# Patient Record
Sex: MALE | Race: WHITE | NOT HISPANIC OR LATINO | URBAN - METROPOLITAN AREA
[De-identification: names, ages, dates, MRNs, and addresses within clinical notes are randomized per-mention and may not be internally consistent; named-entity substitution may affect disease eponyms.]

---

## 2018-10-10 ENCOUNTER — EMERGENCY (EMERGENCY)
Facility: HOSPITAL | Age: 78
LOS: 1 days | Discharge: HOME | End: 2018-10-10
Attending: EMERGENCY MEDICINE | Admitting: EMERGENCY MEDICINE
Payer: MEDICARE

## 2018-10-10 VITALS
SYSTOLIC BLOOD PRESSURE: 151 MMHG | OXYGEN SATURATION: 97 % | RESPIRATION RATE: 20 BRPM | DIASTOLIC BLOOD PRESSURE: 94 MMHG | HEART RATE: 69 BPM | TEMPERATURE: 98 F

## 2018-10-10 DIAGNOSIS — Z79.82 LONG TERM (CURRENT) USE OF ASPIRIN: ICD-10-CM
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
## 2018-10-10 DIAGNOSIS — S01.81XA LACERATION WITHOUT FOREIGN BODY OF OTHER PART OF HEAD, INITIAL ENCOUNTER: ICD-10-CM

## 2018-10-10 DIAGNOSIS — S09.90XA UNSPECIFIED INJURY OF HEAD, INITIAL ENCOUNTER: ICD-10-CM
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
## 2018-10-10 DIAGNOSIS — S51.012A LACERATION WITHOUT FOREIGN BODY OF LEFT ELBOW, INITIAL ENCOUNTER: ICD-10-CM

## 2018-10-10 LAB
ANION GAP SERPL CALC-SCNC: 18 MMOL/L — HIGH (ref 5–17)
APTT BLD: 27 SEC — LOW (ref 27.5–37.4)
BASOPHILS NFR BLD AUTO: 1.4 % — SIGNIFICANT CHANGE UP (ref 0–2)
BLD GP AB SCN SERPL QL: NEGATIVE — SIGNIFICANT CHANGE UP
BUN SERPL-MCNC: 13 MG/DL — SIGNIFICANT CHANGE UP (ref 7–23)
CALCIUM SERPL-MCNC: 9.5 MG/DL — SIGNIFICANT CHANGE UP (ref 8.4–10.5)
CHLORIDE SERPL-SCNC: 97 MMOL/L — SIGNIFICANT CHANGE UP (ref 96–108)
CO2 SERPL-SCNC: 20 MMOL/L — LOW (ref 22–31)
CREAT SERPL-MCNC: 0.88 MG/DL — SIGNIFICANT CHANGE UP (ref 0.5–1.3)
EOSINOPHIL NFR BLD AUTO: 1.5 % — SIGNIFICANT CHANGE UP (ref 0–6)
GLUCOSE SERPL-MCNC: 87 MG/DL — SIGNIFICANT CHANGE UP (ref 70–99)
HCT VFR BLD CALC: 39.1 % — SIGNIFICANT CHANGE UP (ref 39–50)
HGB BLD-MCNC: 13.4 G/DL — SIGNIFICANT CHANGE UP (ref 13–17)
INR BLD: 1 — SIGNIFICANT CHANGE UP (ref 0.88–1.16)
LYMPHOCYTES # BLD AUTO: 36.9 % — SIGNIFICANT CHANGE UP (ref 13–44)
MCHC RBC-ENTMCNC: 34.3 G/DL — SIGNIFICANT CHANGE UP (ref 32–36)
MCHC RBC-ENTMCNC: 34.4 PG — HIGH (ref 27–34)
MCV RBC AUTO: 100.3 FL — HIGH (ref 80–100)
MONOCYTES NFR BLD AUTO: 13.2 % — SIGNIFICANT CHANGE UP (ref 2–14)
NEUTROPHILS NFR BLD AUTO: 47 % — SIGNIFICANT CHANGE UP (ref 43–77)
PLATELET # BLD AUTO: 224 K/UL — SIGNIFICANT CHANGE UP (ref 150–400)
POTASSIUM SERPL-MCNC: 4.4 MMOL/L — SIGNIFICANT CHANGE UP (ref 3.5–5.3)
POTASSIUM SERPL-SCNC: 4.4 MMOL/L — SIGNIFICANT CHANGE UP (ref 3.5–5.3)
PROTHROM AB SERPL-ACNC: 11.1 SEC — SIGNIFICANT CHANGE UP (ref 9.8–12.7)
RBC # BLD: 3.9 M/UL — LOW (ref 4.2–5.8)
RBC # FLD: 13 % — SIGNIFICANT CHANGE UP (ref 10.3–16.9)
RH IG SCN BLD-IMP: NEGATIVE — SIGNIFICANT CHANGE UP
SODIUM SERPL-SCNC: 135 MMOL/L — SIGNIFICANT CHANGE UP (ref 135–145)
WBC # BLD: 6.5 K/UL — SIGNIFICANT CHANGE UP (ref 3.8–10.5)
WBC # FLD AUTO: 6.5 K/UL — SIGNIFICANT CHANGE UP (ref 3.8–10.5)

## 2018-10-10 PROCEDURE — 70450 CT HEAD/BRAIN W/O DYE: CPT

## 2018-10-10 PROCEDURE — 99284 EMERGENCY DEPT VISIT MOD MDM: CPT | Mod: 25

## 2018-10-10 PROCEDURE — 36415 COLL VENOUS BLD VENIPUNCTURE: CPT

## 2018-10-10 PROCEDURE — 86901 BLOOD TYPING SEROLOGIC RH(D): CPT

## 2018-10-10 PROCEDURE — 70450 CT HEAD/BRAIN W/O DYE: CPT | Mod: 26

## 2018-10-10 PROCEDURE — 12053 INTMD RPR FACE/MM 5.1-7.5 CM: CPT

## 2018-10-10 PROCEDURE — 80048 BASIC METABOLIC PNL TOTAL CA: CPT

## 2018-10-10 PROCEDURE — 85027 COMPLETE CBC AUTOMATED: CPT

## 2018-10-10 PROCEDURE — 12004 RPR S/N/AX/GEN/TRK7.6-12.5CM: CPT

## 2018-10-10 PROCEDURE — 85025 COMPLETE CBC W/AUTO DIFF WBC: CPT

## 2018-10-10 PROCEDURE — 85730 THROMBOPLASTIN TIME PARTIAL: CPT

## 2018-10-10 PROCEDURE — 86850 RBC ANTIBODY SCREEN: CPT

## 2018-10-10 PROCEDURE — 86900 BLOOD TYPING SEROLOGIC ABO: CPT

## 2018-10-10 PROCEDURE — 85610 PROTHROMBIN TIME: CPT

## 2018-10-10 RX ORDER — SODIUM CHLORIDE 9 MG/ML
1000 INJECTION INTRAMUSCULAR; INTRAVENOUS; SUBCUTANEOUS
Qty: 0 | Refills: 0 | Status: DISCONTINUED | OUTPATIENT
Start: 2018-10-10 | End: 2018-10-14

## 2018-10-10 RX ADMIN — SODIUM CHLORIDE 125 MILLILITER(S): 9 INJECTION INTRAMUSCULAR; INTRAVENOUS; SUBCUTANEOUS at 20:00

## 2018-10-10 NOTE — ED ADULT NURSE NOTE - CHPI ED NUR SYMPTOMS NEG
no confusion/no numbness/no fever/no vomiting/no deformity/no loss of consciousness/no weakness/no tingling

## 2018-10-10 NOTE — ED ADULT NURSE NOTE - OBJECTIVE STATEMENT
Pt presents s/p mechanical fall approximately one hour ago sustaining left forehead head trauma and abrasion to left elbow. + Headstrike, no LOC, no other obvious signs of trauma or deformity. Pt states "I fell on uneven pavement. I did not have any symptoms prior to the fall". Pt denies taking any blood thinners. Per EMS report patient lost approximately 500cc blood. Pt denies any fevers, or recent illness, no lightheadedness, no dizziness, no headache, no change to vision, no cp, no sob, no n/v, no changes to bowels, no urinary complaints.

## 2018-10-10 NOTE — ED PROVIDER NOTE - NEUROLOGICAL, MLM
awake, alert, oriented x 3, CN II-XII grossly intact, motor 5/5, no gross sens deficits, gait steady, no ataxia, speech clear.

## 2018-10-10 NOTE — ED ADULT NURSE NOTE - NSIMPLEMENTINTERV_GEN_ALL_ED
Implemented All Fall Risk Interventions:  [place] to call system. Call bell, personal items and telephone within reach. Instruct patient to call for assistance. Room bathroom lighting operational. Non-slip footwear when patient is off stretcher. Physically safe environment: no spills, clutter or unnecessary equipment. Stretcher in lowest position, wheels locked, appropriate side rails in place. Provide visual cue, wrist band, yellow gown, etc. Monitor gait and stability. Monitor for mental status changes and reorient to person, place, and time. Review medications for side effects contributing to fall risk. Reinforce activity limits and safety measures with patient and family.

## 2018-10-10 NOTE — ED PROVIDER NOTE - OBJECTIVE STATEMENT
77 yo male no pmh biba after mechanical trip and fall w chi and forehead lac and L elbow skin tear.  No loc, ha, neck/back pain, ext pain/injury.  Last td 1-2 yr ago.  No n/v, change in vision/speech, numbness or weakness in ext, n/v.  Pt takes baby asa daily.  No other complaint.

## 2018-10-10 NOTE — ED PROVIDER NOTE - MEDICAL DECISION MAKING DETAILS
Pt c/o mechanical fall w chi, forehead lac, skin tear L arm w/o loc, ha, neuro deficits, n/v.  Td utd.  Plan labs w repeat hgb at 3 h 2/2 sig blood loss on scene, ct head, lac repair.  Pt declined pain meds.

## 2018-10-10 NOTE — ED PROVIDER NOTE - MUSCULOSKELETAL, MLM
Spine appears normal, range of motion is not limited, no muscle or joint tenderness, 2 4 cm skin tears L elbow o/w no injury or ttp bilat ext, radial and dp 1+

## 2018-10-10 NOTE — ED PROVIDER NOTE - CARE PLAN
Principal Discharge DX:	CHI (closed head injury), initial encounter  Secondary Diagnosis:	Scalp laceration, initial encounter Principal Discharge DX:	CHI (closed head injury), initial encounter  Secondary Diagnosis:	Scalp laceration, initial encounter  Secondary Diagnosis:	Skin tear of elbow without complication, left, initial encounter

## 2018-10-10 NOTE — ED PROVIDER NOTE - SECONDARY DIAGNOSIS.
Scalp laceration, initial encounter Skin tear of elbow without complication, left, initial encounter

## 2018-10-10 NOTE — ED PROVIDER NOTE - ENMT, MLM
Airway patent, Nasal mucosa clear. Mouth with normal mucosa. Throat has no vesicles, no oropharyngeal exudates and uvula is midline.  6 cm deep lac L forehead down to bone w galeal tear

## 2018-10-10 NOTE — ED ADULT TRIAGE NOTE - CHIEF COMPLAINT QUOTE
pt received to er with c/o trip and fall. As per EMS pt lost a significant amount of blood. NO LOC, NO dizziness, no chest pain.

## 2018-10-11 VITALS
RESPIRATION RATE: 18 BRPM | DIASTOLIC BLOOD PRESSURE: 89 MMHG | HEART RATE: 83 BPM | TEMPERATURE: 98 F | OXYGEN SATURATION: 94 % | SYSTOLIC BLOOD PRESSURE: 158 MMHG

## 2018-10-11 LAB
HCT VFR BLD CALC: 40.6 % — SIGNIFICANT CHANGE UP (ref 39–50)
HGB BLD-MCNC: 14.4 G/DL — SIGNIFICANT CHANGE UP (ref 13–17)
MCHC RBC-ENTMCNC: 35.2 PG — HIGH (ref 27–34)
MCHC RBC-ENTMCNC: 35.5 G/DL — SIGNIFICANT CHANGE UP (ref 32–36)
MCV RBC AUTO: 99.3 FL — SIGNIFICANT CHANGE UP (ref 80–100)
PLATELET # BLD AUTO: 218 K/UL — SIGNIFICANT CHANGE UP (ref 150–400)
RBC # BLD: 4.09 M/UL — LOW (ref 4.2–5.8)
RBC # FLD: 13 % — SIGNIFICANT CHANGE UP (ref 10.3–16.9)
WBC # BLD: 7.9 K/UL — SIGNIFICANT CHANGE UP (ref 3.8–10.5)
WBC # FLD AUTO: 7.9 K/UL — SIGNIFICANT CHANGE UP (ref 3.8–10.5)

## 2018-10-19 ENCOUNTER — EMERGENCY (EMERGENCY)
Facility: HOSPITAL | Age: 78
LOS: 1 days | Discharge: HOME | End: 2018-10-19
Admitting: EMERGENCY MEDICINE
Payer: MEDICARE

## 2018-10-19 VITALS
WEIGHT: 177.03 LBS | OXYGEN SATURATION: 98 % | TEMPERATURE: 98 F | SYSTOLIC BLOOD PRESSURE: 148 MMHG | RESPIRATION RATE: 16 BRPM | HEART RATE: 64 BPM | DIASTOLIC BLOOD PRESSURE: 83 MMHG
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
## 2018-10-19 DIAGNOSIS — S01.81XD LACERATION WITHOUT FOREIGN BODY OF OTHER PART OF HEAD, SUBSEQUENT ENCOUNTER: ICD-10-CM

## 2018-10-19 DIAGNOSIS — Z79.82 LONG TERM (CURRENT) USE OF ASPIRIN: ICD-10-CM

## 2018-10-19 PROCEDURE — 99282 EMERGENCY DEPT VISIT SF MDM: CPT

## 2018-10-19 NOTE — ED ADULT TRIAGE NOTE - CHIEF COMPLAINT QUOTE
Pt here to have sutures removed from L forehead - denies fevers, chills, bleeding, drainage or increased pain at site.

## 2018-10-19 NOTE — ED ADULT NURSE NOTE - CHPI ED NUR SYMPTOMS NEG
no bleeding at site/no chills/no rectal pain/no fever/no inflammation/no pain/no redness/no drainage/no bleeding/no purulent drainage

## 2018-10-19 NOTE — ED PROVIDER NOTE - MEDICAL DECISION MAKING DETAILS
pt returns for suture removal however wound not fully healed, no signs of inf, wound care discussed at length, to return in 4 d for removal, pt understands and agrees w/plan

## 2018-10-19 NOTE — ED ADULT NURSE NOTE - NSIMPLEMENTINTERV_GEN_ALL_ED
Implemented All Universal Safety Interventions:  [place] to call system. Call bell, personal items and telephone within reach. Instruct patient to call for assistance. Room bathroom lighting operational. Non-slip footwear when patient is off stretcher. Physically safe environment: no spills, clutter or unnecessary equipment. Stretcher in lowest position, wheels locked, appropriate side rails in place.

## 2018-10-19 NOTE — ED PROVIDER NOTE - SKIN, MLM
+ large dry scab over sutured site of L forehead, + wound dehiscence, wound not fully healed, no erythema, no pus, no swelling, no tend

## 2018-10-19 NOTE — ED ADULT NURSE NOTE - OBJECTIVE STATEMENT
Pt presents to ED today for suture removal.  Pt had stitches placed x8 days ago.  Pt skin intact w/o signs of infection presents.  Pt pending DC order.

## 2018-10-19 NOTE — ED PROVIDER NOTE - OBJECTIVE STATEMENT
The pt is a 79 y/o M, who returns to ED for suture removal - lac sutured a wk ago. Has not been washing/cleaning site. Denies pain, pus, bleeding.

## 2018-10-26 ENCOUNTER — EMERGENCY (EMERGENCY)
Facility: HOSPITAL | Age: 78
LOS: 1 days | Discharge: HOME | End: 2018-10-26
Admitting: EMERGENCY MEDICINE
Payer: MEDICARE

## 2018-10-26 VITALS
RESPIRATION RATE: 16 BRPM | OXYGEN SATURATION: 97 % | WEIGHT: 177.03 LBS | DIASTOLIC BLOOD PRESSURE: 91 MMHG | TEMPERATURE: 98 F | SYSTOLIC BLOOD PRESSURE: 164 MMHG | HEART RATE: 76 BPM | HEIGHT: 72 IN

## 2018-10-26 PROCEDURE — 99212 OFFICE O/P EST SF 10 MIN: CPT

## 2018-10-26 NOTE — ED PROVIDER NOTE - OBJECTIVE STATEMENT
77 yo M here for suture removal. Sutures placed to L forehead 14 days ago. Denies pain, fever, chills, swelling, discharge.

## 2018-10-26 NOTE — ED PROVIDER NOTE - MEDICAL DECISION MAKING DETAILS
77 yo M here for suture removal. Sutures placed to L forehead 14 days ago. Denies pain, fever, chills, swelling, discharge. Well healed laceration, no drainage, no erythema or discharge. 77 yo M here for suture removal. Sutures placed to L forehead 14 days ago. Denies pain, fever, chills, swelling, discharge. Well healed laceration, no drainage, no erythema or discharge. Sutures removed and steri strips placed

## 2018-10-26 NOTE — ED ADULT NURSE NOTE - OBJECTIVE STATEMENT
78y M, A&ox3, presents to ed for suture removal to left forehead, noted 12 stitches, no drainage, no redness, no pain. reports had sutures placed after sustaining previous trip and fall on 10/10. NAD.

## 2018-10-26 NOTE — ED ADULT NURSE NOTE - NSIMPLEMENTINTERV_GEN_ALL_ED
Implemented All Fall Risk Interventions:  [place] to call system. Call bell, personal items and telephone within reach. Instruct patient to call for assistance. Room bathroom lighting operational. Non-slip footwear when patient is off stretcher. Physically safe environment: no spills, clutter or unnecessary equipment. Stretcher in lowest position, wheels locked, appropriate side rails in place. Provide visual cue, wrist band, yellow gown, etc. Monitor gait and stability. Monitor for mental status changes and reorient to person, place, and time. Review medications for side effects contributing to fall risk. Reinforce activity limits and safety measures with patient and family.

## 2018-10-26 NOTE — ED PROVIDER NOTE - PHYSICAL EXAMINATION
CONSTITUTIONAL: Well-appearing; well-nourished; in no apparent distress.   HEAD: Normocephalic; well healed laceration, no drainage, no erythema or discharge.   EYES: PERRL; EOM intact; conjunctiva and sclera clear  ENT: normal nose; no rhinorrhea; normal pharynx with no erythema or lesions.   NECK: Supple; non-tender;   MSK: FROM at all extremities, normal tone   EXT: No cyanosis or edema; N/V intact
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
## 2018-10-30 DIAGNOSIS — S01.81XD LACERATION WITHOUT FOREIGN BODY OF OTHER PART OF HEAD, SUBSEQUENT ENCOUNTER: ICD-10-CM
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
## 2018-10-30 DIAGNOSIS — Z79.899 OTHER LONG TERM (CURRENT) DRUG THERAPY: ICD-10-CM

## 2018-12-19 ENCOUNTER — INPATIENT (INPATIENT)
Facility: HOSPITAL | Age: 78
LOS: 0 days | Discharge: HOME | DRG: 641 | End: 2018-12-19
Attending: INTERNAL MEDICINE | Admitting: INTERNAL MEDICINE
Payer: MEDICARE

## 2018-12-19 ENCOUNTER — TRANSCRIPTION ENCOUNTER (OUTPATIENT)
Age: 78
End: 2018-12-19

## 2018-12-19 VITALS
DIASTOLIC BLOOD PRESSURE: 91 MMHG | HEART RATE: 92 BPM | SYSTOLIC BLOOD PRESSURE: 146 MMHG | OXYGEN SATURATION: 94 % | RESPIRATION RATE: 18 BRPM | TEMPERATURE: 98 F

## 2018-12-19 VITALS
HEART RATE: 81 BPM | OXYGEN SATURATION: 96 % | SYSTOLIC BLOOD PRESSURE: 155 MMHG | TEMPERATURE: 98 F | DIASTOLIC BLOOD PRESSURE: 94 MMHG | RESPIRATION RATE: 18 BRPM

## 2018-12-19 DIAGNOSIS — R74.8 ABNORMAL LEVELS OF OTHER SERUM ENZYMES: ICD-10-CM

## 2018-12-19 DIAGNOSIS — E86.0 DEHYDRATION: ICD-10-CM

## 2018-12-19 DIAGNOSIS — Z29.9 ENCOUNTER FOR PROPHYLACTIC MEASURES, UNSPECIFIED: ICD-10-CM

## 2018-12-19 DIAGNOSIS — D64.9 ANEMIA, UNSPECIFIED: ICD-10-CM
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
## 2018-12-19 DIAGNOSIS — J90 PLEURAL EFFUSION, NOT ELSEWHERE CLASSIFIED: ICD-10-CM

## 2018-12-19 DIAGNOSIS — F10.10 ALCOHOL ABUSE, UNCOMPLICATED: ICD-10-CM

## 2018-12-19 DIAGNOSIS — Z91.89 OTHER SPECIFIED PERSONAL RISK FACTORS, NOT ELSEWHERE CLASSIFIED: ICD-10-CM

## 2018-12-19 DIAGNOSIS — E87.2 ACIDOSIS: ICD-10-CM

## 2018-12-19 DIAGNOSIS — S42.309A UNSPECIFIED FRACTURE OF SHAFT OF HUMERUS, UNSPECIFIED ARM, INITIAL ENCOUNTER FOR CLOSED FRACTURE: ICD-10-CM

## 2018-12-19 DIAGNOSIS — R63.8 OTHER SYMPTOMS AND SIGNS CONCERNING FOOD AND FLUID INTAKE: ICD-10-CM

## 2018-12-19 PROBLEM — Z00.00 ENCOUNTER FOR PREVENTIVE HEALTH EXAMINATION: Status: ACTIVE | Noted: 2018-12-19

## 2018-12-19 LAB
ALBUMIN SERPL ELPH-MCNC: 3.8 G/DL — SIGNIFICANT CHANGE UP (ref 3.3–5)
ALBUMIN SERPL ELPH-MCNC: 4.1 G/DL — SIGNIFICANT CHANGE UP (ref 3.3–5)
ALP SERPL-CCNC: 51 U/L — SIGNIFICANT CHANGE UP (ref 40–120)
ALP SERPL-CCNC: 52 U/L — SIGNIFICANT CHANGE UP (ref 40–120)
ALT FLD-CCNC: 29 U/L — SIGNIFICANT CHANGE UP (ref 10–45)
ALT FLD-CCNC: 31 U/L — SIGNIFICANT CHANGE UP (ref 10–45)
ANION GAP SERPL CALC-SCNC: 11 MMOL/L — SIGNIFICANT CHANGE UP (ref 5–17)
ANION GAP SERPL CALC-SCNC: 19 MMOL/L — HIGH (ref 5–17)
APPEARANCE UR: CLEAR — SIGNIFICANT CHANGE UP
APTT BLD: 23.1 SEC — LOW (ref 27.5–36.3)
APTT BLD: 25 SEC — LOW (ref 27.5–36.3)
AST SERPL-CCNC: 37 U/L — SIGNIFICANT CHANGE UP (ref 10–40)
AST SERPL-CCNC: 41 U/L — HIGH (ref 10–40)
BASOPHILS NFR BLD AUTO: 0.2 % — SIGNIFICANT CHANGE UP (ref 0–2)
BASOPHILS NFR BLD AUTO: 0.3 % — SIGNIFICANT CHANGE UP (ref 0–2)
BILIRUB SERPL-MCNC: 1.1 MG/DL — SIGNIFICANT CHANGE UP (ref 0.2–1.2)
BILIRUB SERPL-MCNC: 1.2 MG/DL — SIGNIFICANT CHANGE UP (ref 0.2–1.2)
BILIRUB UR-MCNC: ABNORMAL
BUN SERPL-MCNC: 10 MG/DL — SIGNIFICANT CHANGE UP (ref 7–23)
BUN SERPL-MCNC: 12 MG/DL — SIGNIFICANT CHANGE UP (ref 7–23)
CALCIUM SERPL-MCNC: 8.4 MG/DL — SIGNIFICANT CHANGE UP (ref 8.4–10.5)
CALCIUM SERPL-MCNC: 8.8 MG/DL — SIGNIFICANT CHANGE UP (ref 8.4–10.5)
CHLORIDE SERPL-SCNC: 105 MMOL/L — SIGNIFICANT CHANGE UP (ref 96–108)
CHLORIDE SERPL-SCNC: 98 MMOL/L — SIGNIFICANT CHANGE UP (ref 96–108)
CHOLEST SERPL-MCNC: 152 MG/DL — SIGNIFICANT CHANGE UP (ref 10–199)
CK SERPL-CCNC: 777 U/L — HIGH (ref 30–200)
CK SERPL-CCNC: 827 U/L — HIGH (ref 30–200)
CO2 SERPL-SCNC: 20 MMOL/L — LOW (ref 22–31)
CO2 SERPL-SCNC: 22 MMOL/L — SIGNIFICANT CHANGE UP (ref 22–31)
COLOR SPEC: YELLOW — SIGNIFICANT CHANGE UP
CREAT SERPL-MCNC: 0.79 MG/DL — SIGNIFICANT CHANGE UP (ref 0.5–1.3)
CREAT SERPL-MCNC: 0.82 MG/DL — SIGNIFICANT CHANGE UP (ref 0.5–1.3)
DIFF PNL FLD: NEGATIVE — SIGNIFICANT CHANGE UP
EOSINOPHIL NFR BLD AUTO: 0.1 % — SIGNIFICANT CHANGE UP (ref 0–6)
GLUCOSE SERPL-MCNC: 131 MG/DL — HIGH (ref 70–99)
GLUCOSE SERPL-MCNC: 156 MG/DL — HIGH (ref 70–99)
GLUCOSE UR QL: 250
HBA1C BLD-MCNC: 5.2 % — SIGNIFICANT CHANGE UP (ref 4–5.6)
HCT VFR BLD CALC: 34.7 % — LOW (ref 39–50)
HCT VFR BLD CALC: 35.6 % — LOW (ref 39–50)
HDLC SERPL-MCNC: 100 MG/DL — SIGNIFICANT CHANGE UP
HGB BLD-MCNC: 11.8 G/DL — LOW (ref 13–17)
HGB BLD-MCNC: 12.5 G/DL — LOW (ref 13–17)
INR BLD: 1.02 — SIGNIFICANT CHANGE UP (ref 0.88–1.16)
INR BLD: 1.02 — SIGNIFICANT CHANGE UP (ref 0.88–1.16)
KETONES UR-MCNC: ABNORMAL MG/DL
LEUKOCYTE ESTERASE UR-ACNC: NEGATIVE — SIGNIFICANT CHANGE UP
LIPID PNL WITH DIRECT LDL SERPL: 43 MG/DL — SIGNIFICANT CHANGE UP
LYMPHOCYTES # BLD AUTO: 11.3 % — LOW (ref 13–44)
LYMPHOCYTES # BLD AUTO: 15.6 % — SIGNIFICANT CHANGE UP (ref 13–44)
MAGNESIUM SERPL-MCNC: 1.9 MG/DL — SIGNIFICANT CHANGE UP (ref 1.6–2.6)
MAGNESIUM SERPL-MCNC: 2 MG/DL — SIGNIFICANT CHANGE UP (ref 1.6–2.6)
MCHC RBC-ENTMCNC: 33.7 PG — SIGNIFICANT CHANGE UP (ref 27–34)
MCHC RBC-ENTMCNC: 34 G/DL — SIGNIFICANT CHANGE UP (ref 32–36)
MCHC RBC-ENTMCNC: 34.7 PG — HIGH (ref 27–34)
MCHC RBC-ENTMCNC: 35.1 G/DL — SIGNIFICANT CHANGE UP (ref 32–36)
MCV RBC AUTO: 98.9 FL — SIGNIFICANT CHANGE UP (ref 80–100)
MCV RBC AUTO: 99.1 FL — SIGNIFICANT CHANGE UP (ref 80–100)
MONOCYTES NFR BLD AUTO: 11.3 % — SIGNIFICANT CHANGE UP (ref 2–14)
MONOCYTES NFR BLD AUTO: 9.9 % — SIGNIFICANT CHANGE UP (ref 2–14)
NEUTROPHILS NFR BLD AUTO: 74.1 % — SIGNIFICANT CHANGE UP (ref 43–77)
NEUTROPHILS NFR BLD AUTO: 77.2 % — HIGH (ref 43–77)
NITRITE UR-MCNC: NEGATIVE — SIGNIFICANT CHANGE UP
PH UR: 6.5 — SIGNIFICANT CHANGE UP (ref 5–8)
PHOSPHATE SERPL-MCNC: 2.9 MG/DL — SIGNIFICANT CHANGE UP (ref 2.5–4.5)
PLATELET # BLD AUTO: 158 K/UL — SIGNIFICANT CHANGE UP (ref 150–400)
PLATELET # BLD AUTO: 158 K/UL — SIGNIFICANT CHANGE UP (ref 150–400)
POTASSIUM SERPL-MCNC: 3.7 MMOL/L — SIGNIFICANT CHANGE UP (ref 3.5–5.3)
POTASSIUM SERPL-MCNC: 4 MMOL/L — SIGNIFICANT CHANGE UP (ref 3.5–5.3)
POTASSIUM SERPL-SCNC: 3.7 MMOL/L — SIGNIFICANT CHANGE UP (ref 3.5–5.3)
POTASSIUM SERPL-SCNC: 4 MMOL/L — SIGNIFICANT CHANGE UP (ref 3.5–5.3)
PROT SERPL-MCNC: 6.1 G/DL — SIGNIFICANT CHANGE UP (ref 6–8.3)
PROT SERPL-MCNC: 6.9 G/DL — SIGNIFICANT CHANGE UP (ref 6–8.3)
PROT UR-MCNC: NEGATIVE MG/DL — SIGNIFICANT CHANGE UP
PROTHROM AB SERPL-ACNC: 11.5 SEC — SIGNIFICANT CHANGE UP (ref 10–12.9)
PROTHROM AB SERPL-ACNC: 11.5 SEC — SIGNIFICANT CHANGE UP (ref 10–12.9)
RBC # BLD: 3.41 M/UL — LOW (ref 4.2–5.8)
RBC # BLD: 3.5 M/UL — LOW (ref 4.2–5.8)
RBC # BLD: 3.6 M/UL — LOW (ref 4.2–5.8)
RBC # FLD: 13.4 % — SIGNIFICANT CHANGE UP (ref 10.3–16.9)
RBC # FLD: 13.5 % — SIGNIFICANT CHANGE UP (ref 10.3–16.9)
RETICS/RBC NFR: 1.4 % — SIGNIFICANT CHANGE UP (ref 0.5–2.5)
SODIUM SERPL-SCNC: 137 MMOL/L — SIGNIFICANT CHANGE UP (ref 135–145)
SODIUM SERPL-SCNC: 138 MMOL/L — SIGNIFICANT CHANGE UP (ref 135–145)
SP GR SPEC: 1.02 — SIGNIFICANT CHANGE UP (ref 1–1.03)
TOTAL CHOLESTEROL/HDL RATIO MEASUREMENT: 1.5 RATIO — LOW (ref 3.4–9.6)
TRIGL SERPL-MCNC: 43 MG/DL — SIGNIFICANT CHANGE UP (ref 10–149)
TROPONIN T SERPL-MCNC: <0.01 NG/ML — SIGNIFICANT CHANGE UP (ref 0–0.01)
TSH SERPL-MCNC: 4.93 UIU/ML — SIGNIFICANT CHANGE UP (ref 0.35–4.94)
UROBILINOGEN FLD QL: 1 E.U./DL — SIGNIFICANT CHANGE UP
WBC # BLD: 8.9 K/UL — SIGNIFICANT CHANGE UP (ref 3.8–10.5)
WBC # BLD: 9 K/UL — SIGNIFICANT CHANGE UP (ref 3.8–10.5)
WBC # FLD AUTO: 8.9 K/UL — SIGNIFICANT CHANGE UP (ref 3.8–10.5)
WBC # FLD AUTO: 9 K/UL — SIGNIFICANT CHANGE UP (ref 3.8–10.5)

## 2018-12-19 PROCEDURE — 73060 X-RAY EXAM OF HUMERUS: CPT | Mod: 26,LT

## 2018-12-19 PROCEDURE — 73060 X-RAY EXAM OF HUMERUS: CPT | Mod: 26

## 2018-12-19 PROCEDURE — 71046 X-RAY EXAM CHEST 2 VIEWS: CPT | Mod: 26

## 2018-12-19 PROCEDURE — 70450 CT HEAD/BRAIN W/O DYE: CPT | Mod: 26

## 2018-12-19 PROCEDURE — 99223 1ST HOSP IP/OBS HIGH 75: CPT | Mod: GC

## 2018-12-19 PROCEDURE — 99285 EMERGENCY DEPT VISIT HI MDM: CPT | Mod: 25

## 2018-12-19 PROCEDURE — 93010 ELECTROCARDIOGRAM REPORT: CPT

## 2018-12-19 PROCEDURE — 93306 TTE W/DOPPLER COMPLETE: CPT | Mod: 26

## 2018-12-19 PROCEDURE — 12345: CPT | Mod: NC,GC

## 2018-12-19 PROCEDURE — 71250 CT THORAX DX C-: CPT | Mod: 26

## 2018-12-19 PROCEDURE — 73030 X-RAY EXAM OF SHOULDER: CPT | Mod: 26,LT

## 2018-12-19 RX ORDER — ASPIRIN/CALCIUM CARB/MAGNESIUM 324 MG
81 TABLET ORAL DAILY
Qty: 0 | Refills: 0 | Status: DISCONTINUED | OUTPATIENT
Start: 2018-12-19 | End: 2018-12-19

## 2018-12-19 RX ORDER — SODIUM CHLORIDE 9 MG/ML
1000 INJECTION INTRAMUSCULAR; INTRAVENOUS; SUBCUTANEOUS
Qty: 0 | Refills: 0 | Status: DISCONTINUED | OUTPATIENT
Start: 2018-12-19 | End: 2018-12-19

## 2018-12-19 RX ORDER — SODIUM CHLORIDE 9 MG/ML
1000 INJECTION, SOLUTION INTRAVENOUS
Qty: 0 | Refills: 0 | Status: DISCONTINUED | OUTPATIENT
Start: 2018-12-19 | End: 2018-12-19

## 2018-12-19 RX ORDER — POTASSIUM CHLORIDE 20 MEQ
40 PACKET (EA) ORAL ONCE
Qty: 0 | Refills: 0 | Status: COMPLETED | OUTPATIENT
Start: 2018-12-19 | End: 2018-12-19

## 2018-12-19 RX ORDER — SODIUM CHLORIDE 9 MG/ML
1000 INJECTION INTRAMUSCULAR; INTRAVENOUS; SUBCUTANEOUS ONCE
Qty: 0 | Refills: 0 | Status: COMPLETED | OUTPATIENT
Start: 2018-12-19 | End: 2018-12-19

## 2018-12-19 RX ADMIN — SODIUM CHLORIDE 120 MILLILITER(S): 9 INJECTION INTRAMUSCULAR; INTRAVENOUS; SUBCUTANEOUS at 04:54

## 2018-12-19 RX ADMIN — SODIUM CHLORIDE 2000 MILLILITER(S): 9 INJECTION INTRAMUSCULAR; INTRAVENOUS; SUBCUTANEOUS at 02:46

## 2018-12-19 RX ADMIN — SODIUM CHLORIDE 120 MILLILITER(S): 9 INJECTION, SOLUTION INTRAVENOUS at 09:04

## 2018-12-19 RX ADMIN — Medication 40 MILLIEQUIVALENT(S): at 12:55

## 2018-12-19 RX ADMIN — SODIUM CHLORIDE 120 MILLILITER(S): 9 INJECTION INTRAMUSCULAR; INTRAVENOUS; SUBCUTANEOUS at 12:55

## 2018-12-19 RX ADMIN — SODIUM CHLORIDE 1000 MILLILITER(S): 9 INJECTION INTRAMUSCULAR; INTRAVENOUS; SUBCUTANEOUS at 11:49

## 2018-12-19 NOTE — H&P ADULT - NSHPLABSRESULTS_GEN_ALL_CORE
.  LABS:                         12.5   8.9   )-----------( 158      ( 19 Dec 2018 02:53 )             35.6         137  |  98  |  12  ----------------------------<  156<H>  4.0   |  20<L>  |  0.82    Ca    8.8      19 Dec 2018 02:53    TPro  6.9  /  Alb  4.1  /  TBili  1.1  /  DBili  x   /  AST  41<H>  /  ALT  31  /  AlkPhos  51      PT/INR - ( 19 Dec 2018 02:53 )   PT: 11.5 sec;   INR: 1.02          PTT - ( 19 Dec 2018 02:53 )  PTT:23.1 sec  Urinalysis Basic - ( 19 Dec 2018 02:35 )    Color: Yellow / Appearance: Clear / S.020 / pH: x  Gluc: x / Ketone: Trace mg/dL  / Bili: Small / Urobili: 1.0 E.U./dL   Blood: x / Protein: NEGATIVE mg/dL / Nitrite: NEGATIVE   Leuk Esterase: NEGATIVE / RBC: x / WBC x   Sq Epi: x / Non Sq Epi: x / Bacteria: x      CARDIAC MARKERS ( 19 Dec 2018 02:53 )  x     / <0.01 ng/mL / 827 U/L / x     / x                RADIOLOGY, EKG & ADDITIONAL TESTS:   < from: CT Head No Cont (18 @ 01:32) >    IMPRESSION: Mildly motion degraded study. No acute intracranial findings   are seen.      < end of copied text >    CT chest: mild-moderate pleural effusion on R side

## 2018-12-19 NOTE — H&P ADULT - PROBLEM SELECTOR PLAN 2
Patient s/p fall with humeral fracture on R arm, likely displaced. Orthopedics called in ED, follow up recs  - Follow up official xray read  - Non-weight bearing for now]  - Follow up orthopedic recs  - Consider OT/PT consult in AM Patient with L sided pleural effusion, unclear etiology. Patient denies symptoms of pneumonia, possibly traumatic given fall but unclear if rib fractures on CT, awaiting official read. No smoking history, and given one sided unlikely to be cancerous.   - Pulm consult in AM  - Follow up official read of CT    #Anemia  Patient with drop in hgb from prior admissions, possible loss in bruising and possible hemothorax  - Follow up CT read, if tapped to see if pleural effusion is blood  - Maintain active type and screen

## 2018-12-19 NOTE — DISCHARGE NOTE ADULT - SECONDARY DIAGNOSIS.
Humeral fracture Pleural effusion Alcohol abuse Transition of care performed with sharing of clinical summary

## 2018-12-19 NOTE — H&P ADULT - PROBLEM SELECTOR PLAN 8
HOLD a/c given possible hemothorax  No indication for stress ulcer ppx at this time    FULL CODE    Dispo: admit to [facility] NPO while awaiting pulm evaluation  Replete lytes to keep K>4 and Mg>2

## 2018-12-19 NOTE — H&P ADULT - PROBLEM SELECTOR PLAN 4
Likely 2/2 trauma given bruising and history. No seizure activity or cardiac history  - Fluids as above  - Trend to peak Patient with 1.5 bottles of wine a day, no hx of withdrawal or seizures in the past. Tremulous on exam at this time, CIWA 3  - CIWA protocol  - Monitor for need of benzodiazepines

## 2018-12-19 NOTE — H&P ADULT - PROBLEM SELECTOR PLAN 1
Patient with R sided pleural effusion, unclear etiology. Patient denies symptoms of pneumonia, possibly traumatic given fall but unclear if rib fractures on CT, awaiting official read. No smoking history, and given one sided unlikely to be cancerous.   - Pulm consult in AM  - Follow up official read of CT Patient with falls that describe as orthostatic hypotension with falls  - Echocardiogram given third heart sound and JVD on exam  - Fall precautions  - Orthostatics, follow up if fluid responsive or not  - PT consult

## 2018-12-19 NOTE — H&P ADULT - PROBLEM SELECTOR PLAN 5
Patient with drop in hgb from prior admissions, possible loss in bruising and possible hemothorax  - Follow up CT read, if tapped to see if pleural effusion is blood  - Maintain active type and screen Patient with dehydration on exam. Likely 2/2 to poor PO intake, EtOH use.   - NS infusion at maintenance rate  - Continue

## 2018-12-19 NOTE — DISCHARGE NOTE ADULT - MEDICATION SUMMARY - MEDICATIONS TO TAKE
I will START or STAY ON the medications listed below when I get home from the hospital:    Physical Therapy  -- 1 application by mouth once a day   -- Indication: For Fall and left humeral fracture    aspirin 81 mg oral tablet  -- 1 tab(s) by mouth once a day  -- Indication: For Prophylactic measure

## 2018-12-19 NOTE — H&P ADULT - PROBLEM SELECTOR PLAN 7
NPO while awaiting pulm evaluation  Replete lytes to keep K>4 and Mg>2 Likely 2/2 dehydration given ketones in urine, elevated CK

## 2018-12-19 NOTE — DISCHARGE NOTE ADULT - HOSPITAL COURSE
Patient is an 77 yo M who denies PMHx presents s/p fall at home in the setting of decreased PO intake. Patient's wife states that he has not been eating or drinking as well and has been having frequent dizzy spells and falls over the past month or so. Patient drinks 5-6 glasses of wine a day, about 1.5 bottles. Denies withdrawal ever, has stopped for months at a time. He has had falls and presented to the ED for Laceration in 10/2018. He fell on his left side on day prior to presentation with bruising to his left arm but did not want to go to the ER, and fell again on night of presentation after trying to go to the bathroom and hit the back of this head. No prodromal symptoms, no post-syncopal episodes, no tongue-biting behavior, no tonic-clonic movements, no chest pain or palpitations. ROS otherwise negative. ED Vitals: T(F): 98.2 HR: 82 BP: 134/84 RR: 16 SpO2: 94%. ED administration NS 1L bolus. CT scan of chest demonstrated displaced fracture of the left proximal humerus, chronic left rib fractures, moderate left pleural effusion of simple fluid density with underlying atelectasis. Ortho consulted, no acute interventions, placed in sling, no weight bearing when in sling.   Admitted to [facility] for further work up and management. Patient remained asymptomatic, in no respiratory distress. Echo demonstrated normal LV function, LVEF 65-70%. CIWA 0. Orthostatic positive x1 - received 1L IV NS bolus with repeat orthostatics negative. Labs significant for normocytic anemia downtrending to Hb 11.8 likely due to alcohol use and dilutional. Fall determined to be likely 2/2 dehydration in the setting of poor PO intake and alcohol use evidenced by positive orthostatics and Patient stable for discharge home with PT prescription for further eval and treatment of dizziness and humeral fracture, f/u with Dr. Wei (orthopedic surgeon) on 1/3/19, PCP f/u and outpatient work up of pleural effusion by pulmonologist. Patient refused M appointment, preferred to make appointments with outside providers himself. Patient was counseled on alcohol cessation, proper diet, and need for follow up appointments as described above.

## 2018-12-19 NOTE — CONSULT NOTE ADULT - SUBJECTIVE AND OBJECTIVE BOX
Patient is a 78y old  Male who presents with a chief complaint of R pleural effusion, dehydration (19 Dec 2018 04:56)       HPI:  Patient is an 79 yo M who denies PMHx presents s/p fall at home. Patient's wife states that he has not been eating or drinking as well and has been having frequent dizzy spells and falls over the past month or so. Patient drinks 5-6 glasses of wine a day, about 1.5 bottles. Denies withdrawal ever, has stopped for months at a time. He has had falls and presented to the ED for Laceration in the recent past. He fell on his left side yesterday with bruising to his left arm but did not want to go to the ER, and fell again today after trying to go to the bathroom and hit the back of this head. Every fall is when he is standing Patient's wife further reports that he has had snoring and cessation of breathing in his sleep which has not been treated. Patient has had loss of bladder with the feinting spells in the past but not often, no tongue biting or shaking of extremities and patient is back to himself instantly afterward. Patient's wife states that he has turned white during the spells and then suddenly comes back to himself. He otherwise feels well, denies fever, chills, HA, changes in vision, throat pain, N/V/D/C, CP, SOB, palptaitions, no urinary sx.     ED Vitals: T(F): 98.2 HR: 82 BP: 134/84 RR: 16 SpO2: 94%  ED adiministration: NS 1L bolus    Family history negative for cardiac disease, diabetes, cancer (19 Dec 2018 04:56)      PAST MEDICAL & SURGICAL HISTORY:  No pertinent past medical history  No significant past surgical history      MEDICATIONS  (STANDING):  sodium chloride 0.9%. 1000 milliLiter(s) (120 mL/Hr) IV Continuous <Continuous>    MEDICATIONS  (PRN):      Social History: lives with his wife in an elevator accessible apartment building, no home care services    Functional Level Prior to Admission: ADL independent, walks without assistive devices    FAMILY HISTORY:  No pertinent family history in first degree relatives      CBC Full  -  ( 19 Dec 2018 10:28 )  WBC Count : 9.0 K/uL  Hemoglobin : 11.8 g/dL  Hematocrit : 34.7 %  Platelet Count - Automated : 158 K/uL  Mean Cell Volume : 99.1 fL  Mean Cell Hemoglobin : 33.7 pg  Mean Cell Hemoglobin Concentration : 34.0 g/dL  Auto Neutrophil # : x  Auto Lymphocyte # : x  Auto Monocyte # : x  Auto Eosinophil # : x  Auto Basophil # : x  Auto Neutrophil % : 74.1 %  Auto Lymphocyte % : 15.6 %  Auto Monocyte % : 9.9 %  Auto Eosinophil % : 0.1 %  Auto Basophil % : 0.3 %          138  |  105  |  10  ----------------------------<  131<H>  3.7   |  22  |  0.79    Ca    8.4      19 Dec 2018 10:28  Phos  2.9       Mg     1.9         TPro  6.1  /  Alb  3.8  /  TBili  1.2  /  DBili  x   /  AST  37  /  ALT  29  /  AlkPhos  52        Urinalysis Basic - ( 19 Dec 2018 02:35 )    Color: Yellow / Appearance: Clear / S.020 / pH: x  Gluc: x / Ketone: Trace mg/dL  / Bili: Small / Urobili: 1.0 E.U./dL   Blood: x / Protein: NEGATIVE mg/dL / Nitrite: NEGATIVE   Leuk Esterase: NEGATIVE / RBC: x / WBC x   Sq Epi: x / Non Sq Epi: x / Bacteria: x          Radiology:    < from: CT Head No Cont (.19.18 @ 01:32) >  EXAM:  CT BRAIN                          PROCEDURE DATE:  2018          INTERPRETATION:  PROCEDURE: CT head without intravenous contrast    INDICATION: Status post fall with head trauma.    TECHNIQUE: Multiple axial images were obtained at 5 mm intervals from the   skull base to the vertex. The images were reviewed in brain and bone   windows. Study is mildly motion degraded.    COMPARISON: Prior study dated 10/10/2018.    FINDINGS: The CT examination demonstrates the ventricles, cisternal   spaces, and cortical sulci to be within normal limits. Mild patchy   periventricular white matter hypodensities likely related to   microvascular ischemic white matter disease. There is no midline shift or   extra axial collections. The gray whitedifferentiation appears within   normal limits. There is no evidence of intracranial hemorrhage or acute   transcortical infarct. The bony windows demonstrates no fractures. The   visualized paranasal sinuses are within normal limits. The mastoid air  cells are well aerated. Status post bilateral cataract surgery.    IMPRESSION: Mildly motion degraded study. No acute intracranial findings   are seen.        < from: CT Chest No Cont (18 @ 04:30) >    EXAM:  CT CHEST                          PROCEDURE DATE:  2018          INTERPRETATION:  CT Chest    History: Evaluate effusion seen on chest x-ray. Fall, dizziness, rule out   pneumonia.    Technique: CT scan of chest performed from lung apices through lung   bases.  1 mm thick axial images, axial MIPS, and sagittal and coronal   reformatted images were produced. Intravenous contrast was not   administered, as ordered.    Comparison: None.    Findings:     Lungs and large airways: There are a few calcified micronodules   throughout the lungs, likely the sequela of granulomatous disease.     Pleura:  Moderate left pleural effusion of simple fluid density with   adjacent consolidation which likely represents compressive atelectasis.   Calcified plaque/radiopaque density on the left diaphragmatic pleura and   at the lung apices may represent asbestos-related disease.    Adenopathy: No thoracic lymphadenopathy.    Heart and pericardium:  Heart size is normal. No pericardial effusion.  Calcification of the aortic and mitral valves. Decreased density of the   blood in the heart as compared to the myocardium, consistent with anemia.    Vessels:  Heavy coronary artery calcifications. No aortic dilatation.    Chest wall and lower neck:  Subcentimeter hypodense right thyroid nodule.    Upper abdomen: Small hiatal hernia. Hepatic steatosis. 4.9 x 4.4 x 4.8 cm   hypodense lesion in hepatic segment 3 likely represents cyst versus   hemangioma. 1.2 cm cyst in the right hepatic lobe.    Bones:  Partially imaged displaced fracture of the left proximal humerus.   Mild soft tissue infiltration and swelling of the left anterolateral   chest wall and axilla. No acute rib fracture. Multiple old left-sided rib   fractures. Degenerative changes the spine.    Impression:   1. Partially imaged displaced fracture of the left proximal humerus. Soft   tissue infiltration and swelling of the left anterolateral chest wall and   axilla. No displaced rib fracture.    2. Moderate left pleural effusion of simple fluid density with underlying   atelectasis.    3. Anemia.            Vital Signs Last 24 Hrs  T(C): 36.6 (19 Dec 2018 08:47), Max: 37.1 (19 Dec 2018 05:48)  T(F): 97.9 (19 Dec 2018 08:47), Max: 98.7 (19 Dec 2018 05:48)  HR: 86 (19 Dec 2018 08:47) (64 - 86)  BP: 151/82 (19 Dec 2018 08:47) (134/84 - 155/94)  BP(mean): --  RR: 18 (19 Dec 2018 08:47) (16 - 18)  SpO2: 97% (19 Dec 2018 08:47) (94% - 97%)    REVIEW OF SYSTEMS:    CONSTITUTIONAL: No fever, weight loss, or fatigue  EYES: No eye pain, visual disturbances, or discharge  ENMT:  No difficulty hearing, tinnitus, vertigo; No sinus or throat pain  NECK: No pain or stiffness  BREASTS: No pain, masses, or nipple discharge  RESPIRATORY: No cough, wheezing, chills or hemoptysis; No shortness of breath  CARDIOVASCULAR: No chest pain, palpitations, dizziness, or leg swelling  GASTROINTESTINAL: No abdominal or epigastric pain. No nausea, vomiting, or hematemesis; No diarrhea or constipation. No melena or hematochezia.  GENITOURINARY: No dysuria, frequency, hematuria, or incontinence  NEUROLOGICAL: No headaches, memory loss, loss of strength, numbness, or tremors  SKIN: No itching, burning, rashes, or lesions   LYMPH NODES: No enlarged glands  ENDOCRINE: No heat or cold intolerance; No hair loss  MUSCULOSKELETAL: left shoulder pain  PSYCHIATRIC: No depression, anxiety, mood swings, or difficulty sleeping  HEME/LYMPH: No easy bruising, or bleeding gums  ALLERGY AND IMMUNOLOGIC: No hives or eczema  VASCULAR: no swelling, erythema      Physical Exam: WDWN 79 yo  gentleman lying in semi Sanchez's position, Left shoulder sling in place    Head: normocephalic, atraumatic    Eyes: PERRLA, EOMI, no nystagmus, sclera anicteric    ENT: nasal discharge, uvula midline, no oropharyngeal erythema/exudate    Neck: supple, negative JVD, negative carotid bruits, no thyromegaly    Chest: decreased breath sounds left base    Cardiovascular: regular rate and rhythm, neg murmurs/rubs/gallops    Abdomen: soft, non distended, non tender, negative rebound/guarding, normal bowel sounds, neg hepatosplenomegaly    Extremities: WWP, neg cyanosis/clubbing/edema, negative calf tenderness to palpation, negative Jin's sign    :     Neurologic Exam:    Alert and oriented to person, place, date/year, speech fluent w/o dysarthria, recent and remote memory intact, repetition intact, comprehension intact,     Cranial Nerves:     II:                       pupils equal, round and reactive to light, visual fields intact   III/ IV/VI:            extraocular movements intact, neg nystagmus, ptosis  V:                       facial sensation intact, V1-3 normal  VII:                     face symmetric, no droop, normal eye closure and smile  VIII:                    hearing intact to finger rub bilaterally  IX/ X:                 soft palate rise symmetrical  XI:                      head turning, shoulder shrug normal  XII:                     tongue midline    Motor Exam:    Upper Extremities:              Right:    5/5 /intrinsics                                                          5/5 deltoid                                                          5/5 biceps                                                          5/5 triceps                                                          5/5 wrist extensors-flexors                                                          negative pronator drift                                                Left:      5/5 /intrinsics                                                          deltoid not tested                                                          5/5 biceps                                                          5/5 triceps                                                          5/5 wrist extensors/flexors                                                          negative pronator drift      Lower Extremities:             Right:     4/5 hip flexors/adductors/abductors                                                           5/5 quadriceps/hamstrings                                                           5/5 dorsiflexors/plantar flexors/invertors-evertors                                               Left:        4/5 hip flexors/adductors/abductors                                                            5/5 quadriceps/hamstrings                                                            5/5 dorsiflexors/plantar flexors/invertors-evertors    Sensory:              intact to LT/PP in all UE/LE dermatomes    DTR:                   = biceps/     triceps/     brachioradialis                            = patella/   medial hamstring/    ankle                            neg clonus                            neg Babinski                            neg Hoffmans      Romberg:  not tested    Tandem Walking:  not tested    Gait:  not tested        PM&R Impression:    1) s/p fall  2) Left partially displaced proximal humerus fracture/ non weight bearing  3) left pleural effusion    Recommendations:    1) Physical therapy focusing on therapeutic exercises, bed mobility/transfer out of bed evaluation, progressive ambulation with assistive devices.    2) Anticipated Disposition Plan/Recs: pending functional progress

## 2018-12-19 NOTE — DISCHARGE NOTE ADULT - PROVIDER TOKENS
FREE:[LAST:[Jostin],FIRST:[Kenneth],PHONE:[phone]],FAX:[(   [phone]    -],ADDRESS:[[facility]  [address], [address] [place] [address]]]

## 2018-12-19 NOTE — DISCHARGE NOTE ADULT - PLAN OF CARE
Keep hydrated, refrain from alcohol use You came into the hospital because you fell and hit your head and left side of the body. You were dizzy when you stood up and testing showed that your blood pressure dropped when you stood up. This situation happens when you are dehydrated, which can be due to several reasons but we believe yours was due to poor oral intake of fluids from alcohol use. After we gave you fluids through your IV, the repeat testing was normal. Please refrain from alcohol and please take in plenty of water to prevent dehydration. You came into the hospital because you fell and hit your head and left side of the body. You were dizzy when you stood up and testing showed that your blood pressure dropped when you stood up. This situation happens when you are dehydrated, which can be due to several reasons but we believe yours was due to poor oral intake of fluids from alcohol use. After we gave you fluids through your IV, the repeat testing was normal. Please refrain from alcohol and please take in plenty of water to prevent dehydration. Please make an appointment with your primary care physician within 1-2 weeks from discharge from the hospital. If you continue to feel dizzy or keep falling, please return to the ED for further evaluation. Outpatient follow up You had a CT scan of the chest as well as x-rays that showed a fracture of a bone in your left arm, called the humerus. You were seen by our orthopedic surgeon team, who placed you in a sling and decided you did not need surgery. Please do not carry weight when you have your left arm in the sling. We made you a follow up with Dr. Wei, the orthopedic surgeon who saw you in the hospital. Your appointment is on January 3rd, 2019 at 12PM (noon) at [facility]. Keep hydrated, refrain from alcohol use, Physical therapy appointment needed with prescription provided You came into the hospital because you fell and hit your head and left side of the body. You were dizzy when you stood up and testing showed that your blood pressure dropped when you stood up. This situation happens when you are dehydrated, which can be due to several reasons but we believe yours was due to poor oral intake of fluids from alcohol use. After we gave you fluids through your IV, the repeat testing was normal. Please refrain from alcohol and please take in plenty of water to prevent dehydration. Please make an appointment with your primary care physician within 1-2 weeks from discharge from the hospital. Please make an appointment with physical therapy to further evaluate and treat you. If you continue to feel dizzy or keep falling, please return to the ED for further evaluation. You had a CT scan of the chest as well as x-rays that showed a fracture of a bone in your left arm, called the humerus. You were seen by our orthopedic surgeon team, who placed you in a sling and decided you did not need surgery. Please do not carry weight when you have your left arm in the sling. We made you a follow up with Dr. Wei, the orthopedic surgeon who saw you in the hospital. Your appointment is on January 3rd, 2019 at 12PM (noon) at the [facility]. The clinic is located at [address] [place] [address] in [facility] on the 5th Floor. Please attend this appointment. We also gave you a prescription for physical therapy. Please work with physical therapy to regain strength and to further evaluate and treat your reason for your fall. Pulmonary Doctor follow up Your CT scan of the chest showed that you had fluid in your left lung, called a pleural effusion. The exact cause of the effusion is unclear at the moment and you will need outpatient pulmonology follow up. You did not require an inpatient drainage of the fluid because you were breathing comfortably, had no pain, and your other vital signs were stable. You had an echocardiogram of the heart, which showed that your heart function was normal so it is unlikely an effusion due to a cardiac, or heart-related, matter. You expressed interest in making your own appointments for follow up. Please make an appointment with your primary care physician within 1-2 weeks of discharge from the hospital. Please get a referral for a pulmonologist for further work up and management. If you notice worsening shortness of breath, chest pain, or swelling in the body, please return to the emergency department for further evaluation. Quit Alcohol use, Cut back on alcohol use You stated that you were drinking 5-6 glasses of wine per day, or about 1 and a half bottles of wine. This is considered excessive alcohol use. Male adults should have no more than 14 drinks in a week. Please cut back on your alcohol use. If you need help cutting back or quitting alcohol, please seek help from Alcoholic Anonymous, or another alcohol rehabilitation program. Continuity of Care 1) PCP Contacted on Admission: (Y/N) --> Name & Phone #:  2) Date of Contact with PCP:  3) PCP Contacted at Discharge: (Y/N, N/A) N/A : patient refused for provider to contact PCP, stated he would prefer to do so himself and make his own appointments.  4) Summary of Handoff Given to PCP:   5) Post-Discharge Appointment Date and Location: Patient will require outpatient follow up with his primary care physician and seek pulmonologist follow up within 1-2 weeks of discharge. Patient refused [facility] follow up.

## 2018-12-19 NOTE — H&P ADULT - PROBLEM SELECTOR PLAN 10
1.       PCP Contacted on Admission: (Y/N) --> Name & Phone #:  2.       Date of Contact with PCP:  3.       PCP Contacted at Discharge: (Y/N)  4.       Summary of Handoff Given to PCP:  5.       Post-Discharge Appointment Date and Location: [facility]

## 2018-12-19 NOTE — ED PROVIDER NOTE - MEDICAL DECISION MAKING DETAILS
78M with no sign PMhx who p/w frequent falls and dizziness upon change in position, worse in the past few days with decreased PO (only drinking wine and tea) and fall yesterday on to left arm (pt RHD) and fall today hitting back of head. No LOC, VSS, afebrile, exam as noted. XR shows left humerus fracture for which ortho was consulted. CXR shows left pleural effusion, CT ordered for further characterization, CT head neg for ICH, labs reveal dehydration and pt hydrated with NS. Will admit to medicine due to fall risk and dehydration, ortho on consult for humerus fracture.

## 2018-12-19 NOTE — DISCHARGE NOTE ADULT - CARE PLAN
Principal Discharge DX:	Dehydration  Secondary Diagnosis:	Humeral fracture  Secondary Diagnosis:	Pleural effusion  Secondary Diagnosis:	Alcohol abuse  Secondary Diagnosis:	Transition of care performed with sharing of clinical summary Principal Discharge DX:	Dehydration  Goal:	Keep hydrated, refrain from alcohol use  Assessment and plan of treatment:	You came into the hospital because you fell and hit your head and left side of the body. You were dizzy when you stood up and testing showed that your blood pressure dropped when you stood up. This situation happens when you are dehydrated, which can be due to several reasons but we believe yours was due to poor oral intake of fluids from alcohol use. After we gave you fluids through your IV, the repeat testing was normal. Please refrain from alcohol and please take in plenty of water to prevent dehydration.  Secondary Diagnosis:	Humeral fracture  Secondary Diagnosis:	Pleural effusion  Secondary Diagnosis:	Alcohol abuse  Secondary Diagnosis:	Transition of care performed with sharing of clinical summary Principal Discharge DX:	Dehydration  Goal:	Keep hydrated, refrain from alcohol use  Assessment and plan of treatment:	You came into the hospital because you fell and hit your head and left side of the body. You were dizzy when you stood up and testing showed that your blood pressure dropped when you stood up. This situation happens when you are dehydrated, which can be due to several reasons but we believe yours was due to poor oral intake of fluids from alcohol use. After we gave you fluids through your IV, the repeat testing was normal. Please refrain from alcohol and please take in plenty of water to prevent dehydration. Please make an appointment with your primary care physician within 1-2 weeks from discharge from the hospital. If you continue to feel dizzy or keep falling, please return to the ED for further evaluation.  Secondary Diagnosis:	Humeral fracture  Goal:	Outpatient follow up  Assessment and plan of treatment:	You had a CT scan of the chest as well as x-rays that showed a fracture of a bone in your left arm, called the humerus. You were seen by our orthopedic surgeon team, who placed you in a sling and decided you did not need surgery. Please do not carry weight when you have your left arm in the sling. We made you a follow up with Dr. Wei, the orthopedic surgeon who saw you in the hospital. Your appointment is on January 3rd, 2019 at 12PM (noon) at [facility].  Secondary Diagnosis:	Pleural effusion  Secondary Diagnosis:	Alcohol abuse  Secondary Diagnosis:	Transition of care performed with sharing of clinical summary Principal Discharge DX:	Fall  Goal:	Keep hydrated, refrain from alcohol use, Physical therapy appointment needed with prescription provided  Assessment and plan of treatment:	You came into the hospital because you fell and hit your head and left side of the body. You were dizzy when you stood up and testing showed that your blood pressure dropped when you stood up. This situation happens when you are dehydrated, which can be due to several reasons but we believe yours was due to poor oral intake of fluids from alcohol use. After we gave you fluids through your IV, the repeat testing was normal. Please refrain from alcohol and please take in plenty of water to prevent dehydration. Please make an appointment with your primary care physician within 1-2 weeks from discharge from the hospital. Please make an appointment with physical therapy to further evaluate and treat you. If you continue to feel dizzy or keep falling, please return to the ED for further evaluation.  Secondary Diagnosis:	Humeral fracture  Goal:	Outpatient follow up  Assessment and plan of treatment:	You had a CT scan of the chest as well as x-rays that showed a fracture of a bone in your left arm, called the humerus. You were seen by our orthopedic surgeon team, who placed you in a sling and decided you did not need surgery. Please do not carry weight when you have your left arm in the sling. We made you a follow up with Dr. Wei, the orthopedic surgeon who saw you in the hospital. Your appointment is on January 3rd, 2019 at 12PM (noon) at the [facility]. The clinic is located at [address] [place] [address] in [facility] on the 5th Floor. Please attend this appointment. We also gave you a prescription for physical therapy. Please work with physical therapy to regain strength and to further evaluate and treat your reason for your fall.  Secondary Diagnosis:	Pleural effusion  Goal:	Pulmonary Doctor follow up  Assessment and plan of treatment:	Your CT scan of the chest showed that you had fluid in your left lung, called a pleural effusion. The exact cause of the effusion is unclear at the moment and you will need outpatient pulmonology follow up. You did not require an inpatient drainage of the fluid because you were breathing comfortably, had no pain, and your other vital signs were stable. You had an echocardiogram of the heart, which showed that your heart function was normal so it is unlikely an effusion due to a cardiac, or heart-related, matter. You expressed interest in making your own appointments for follow up. Please make an appointment with your primary care physician within 1-2 weeks of discharge from the hospital. Please get a referral for a pulmonologist for further work up and management. If you notice worsening shortness of breath, chest pain, or swelling in the body, please return to the emergency department for further evaluation.  Secondary Diagnosis:	Alcohol abuse  Goal:	Quit Alcohol use, Cut back on alcohol use  Assessment and plan of treatment:	You stated that you were drinking 5-6 glasses of wine per day, or about 1 and a half bottles of wine. This is considered excessive alcohol use. Male adults should have no more than 14 drinks in a week. Please cut back on your alcohol use. If you need help cutting back or quitting alcohol, please seek help from Alcoholic Anonymous, or another alcohol rehabilitation program.  Secondary Diagnosis:	Transition of care performed with sharing of clinical summary  Goal:	Continuity of Care  Assessment and plan of treatment:	1) PCP Contacted on Admission: (Y/N) --> Name & Phone #:  2) Date of Contact with PCP:  3) PCP Contacted at Discharge: (Y/N, N/A) N/A : patient refused for provider to contact PCP, stated he would prefer to do so himself and make his own appointments.  4) Summary of Handoff Given to PCP:   5) Post-Discharge Appointment Date and Location: Patient will require outpatient follow up with his primary care physician and seek pulmonologist follow up within 1-2 weeks of discharge. Patient refused M follow up.

## 2018-12-19 NOTE — H&P ADULT - PROBLEM SELECTOR PLAN 3
Patient with dehydration on exam. Likely 2/2 to poor PO intake, EtOH use.   - NS infusion at maintenance rate  - Continue Patient s/p fall with humeral fracture on R arm, likely displaced. Orthopedics called in ED, follow up recs  - Follow up official xray read  - Non-weight bearing for now]  - Follow up orthopedic recs  - Consider OT/PT consult in AM

## 2018-12-19 NOTE — ED ADULT TRIAGE NOTE - ARRIVAL INFO ADDITIONAL COMMENTS
pt was in BR and became dizzy and had near syncope.  wife assisted pt to the ground and he struck his head on the tile floor.  no loc.  denies chest pain, injury or dizziness now.   wife states that pt has been falling frequently over last few weeks.

## 2018-12-19 NOTE — DISCHARGE NOTE ADULT - ADDITIONAL INSTRUCTIONS
Please make an appointment with physical therapy with the prescription that we gave you for further work up and evaluation of your falls, as well as treatment for your fracture of the left arm.  We made you an appointment with Dr. Wei, who is an orthopedic surgeon at [facility]. Your appointment is on January 3rd, 2019 at 12PM (noon) at the [facility]. The clinic is located at [address] [place] [address] in [facility] on the 5th Floor. Please attend this appointment.  You need to make an appointment with your primary care physician within 1-2 weeks for continued work up and management.  You need to seek referral from your primary care physician to see a pulmonologist (lung doctor). Please make an appointment with a pulmonologist as soon as your referral is approved.

## 2018-12-19 NOTE — DISCHARGE NOTE ADULT - PATIENT PORTAL LINK FT
You can access the [facility] Patient Portal, offered by [facility], by registering with the following website: [email or web address]

## 2018-12-19 NOTE — H&P ADULT - PROBLEM SELECTOR PLAN 6
Likely 2/2 dehydration given ketones in urine, elevated CK Likely 2/2 trauma given bruising and history. No seizure activity or cardiac history  - Fluids as above  - Trend to peak    #Elevated AST  - ?due to alcohol use, follow up repeat

## 2018-12-19 NOTE — ED ADULT NURSE NOTE - NSIMPLEMENTINTERV_GEN_ALL_ED
Implemented All Fall Risk Interventions:  [place] to call system. Call bell, personal items and telephone within reach. Instruct patient to call for assistance. Room bathroom lighting operational. Non-slip footwear when patient is off stretcher. Physically safe environment: no spills, clutter or unnecessary equipment. Stretcher in lowest position, wheels locked, appropriate side rails in place. Provide visual cue, wrist band, yellow gown, etc. Monitor gait and stability. Monitor for mental status changes and reorient to person, place, and time. Review medications for side effects contributing to fall risk. Reinforce activity limits and safety measures with patient and family.

## 2018-12-19 NOTE — H&P ADULT - HISTORY OF PRESENT ILLNESS
Patient is an 77 yo M who denies PMHx presents s/p fall at home. Patient's wife states that he has not been eating or drinking as well and has been having frequent dizzy spells and falls. Patient drinks two glasses of wine daily and tea, not high in caloric intake. Denies withdrawal ever. He has had falls and presented to the ED for Laceration in the lpast. He fell on his left side yesterday with bruising to his left arm but did not want to go to the ER, and fell again today after trying to go to the bathroom and hit the back of this head. He otherwise feels well, no LOC, denies fever, chills, HA, changes in viiosn, throat paibn, N/V/D/C, CP, SOB, palptaitions, no urinary sx.     ED Vitals: T(F): 98.2 HR: 82 BP: 134/84 RR: 16 SpO2: 94%  ED adiministration: NS 1L bolus    Family history negative for cardiac disease, diabetes, cancer Patient is an 77 yo M who denies PMHx presents s/p fall at home. Patient's wife states that he has not been eating or drinking as well and has been having frequent dizzy spells and falls over the past month or so. Patient drinks 5-6 glasses of wine a day, about 1.5 bottles. Denies withdrawal ever, has stopped for months at a time. He has had falls and presented to the ED for Laceration in the recent past. He fell on his left side yesterday with bruising to his left arm but did not want to go to the ER, and fell again today after trying to go to the bathroom and hit the back of this head. Patient's wife further reports that he has had snoring and cessation of breathing in his sleep which has not been treated. Patient has had loss of bladder with the feinting spells in the past but not often, no tongue biting or shaking of extremities and patient is back to himself instantly afterward. Patient's wife states that he has turned white during the spells and then suddenly comes back to himself. He otherwise feels well, denies fever, chills, HA, changes in vision, throat pain, N/V/D/C, CP, SOB, palptaitions, no urinary sx.     ED Vitals: T(F): 98.2 HR: 82 BP: 134/84 RR: 16 SpO2: 94%  ED adiministration: NS 1L bolus    Family history negative for cardiac disease, diabetes, cancer Patient is an 77 yo M who denies PMHx presents s/p fall at home. Patient's wife states that he has not been eating or drinking as well and has been having frequent dizzy spells and falls over the past month or so. Patient drinks 5-6 glasses of wine a day, about 1.5 bottles. Denies withdrawal ever, has stopped for months at a time. He has had falls and presented to the ED for Laceration in the recent past. He fell on his left side yesterday with bruising to his left arm but did not want to go to the ER, and fell again today after trying to go to the bathroom and hit the back of this head. Every fall is when he is standing Patient's wife further reports that he has had snoring and cessation of breathing in his sleep which has not been treated. Patient has had loss of bladder with the feinting spells in the past but not often, no tongue biting or shaking of extremities and patient is back to himself instantly afterward. Patient's wife states that he has turned white during the spells and then suddenly comes back to himself. He otherwise feels well, denies fever, chills, HA, changes in vision, throat pain, N/V/D/C, CP, SOB, palptaitions, no urinary sx.     ED Vitals: T(F): 98.2 HR: 82 BP: 134/84 RR: 16 SpO2: 94%  ED adiministration: NS 1L bolus    Family history negative for cardiac disease, diabetes, cancer

## 2018-12-19 NOTE — ED PROVIDER NOTE - PHYSICAL EXAMINATION
GEN: Elderly, well-nourished, NTAF, chronically ill appearing. awake, alert, oriented to person, place, time/situation, appears generally weak and dehydrated.  ENT: Airway patent, Nasal mucosa clear. Mouth with dry mucosa.  EYES: Clear bilaterally. perrl, eomi  RESPIRATORY: Breathing comfortably with normal RR. Decreased BS left base of lung  CARDIAC: Regular rate and rhythm  ABDOMEN: Soft, nontender, +bowel sounds, no rebound, rigidity, or guarding.  MSK: Decreased ROM of left upper arm with significant STS and ecchymosis, compartments soft, distal ext +2 and pt nvi in all 4 ext. No midline c/t/l-spine TTP.   NEURO: Alert and oriented x 3. Cn 2-12 intact. Strength 5/5 and sensation intact in all 4 extremities. No tremors or tongue fasculations at this time.  SKIN: Skin normal color for race, warm, dry, skin plaques and patches, ecchymosis to LUE.

## 2018-12-19 NOTE — ED ADULT NURSE NOTE - OBJECTIVE STATEMENT
79 y/o pt AOx3 arrived to the ED with his wife c/o having fallen and hit the back of his head, this is the 2nd time that he has fallen. The pt fell first last night and has bruising to the left arm. Pt rates the pain a 4/10, and denies having any PMHX.

## 2018-12-19 NOTE — DISCHARGE NOTE ADULT - CARE PROVIDER_API CALL
Jostin, [facility]  [address] [facility], [address]th [place] [address]  Phone: [phone]  Fax: (       -

## 2018-12-19 NOTE — ED PROVIDER NOTE - OBJECTIVE STATEMENT
78M who denies PMhx, daily drinker, who p/w fall. Per wife, pt has not been eating or drinking well lately and has had frequent dizzy spells and falls. Pt fell onto his left side yesterday with significant bruising to left arm but did not want to go to the ER. Today he fell again after getting up to go to the bathroom and fell backwards, hitting the back of his head on the ground. no LOC, no n/v, no f/c, no CP/SOB, no n/t/w in ext, no urinary sx.

## 2018-12-19 NOTE — H&P ADULT - ASSESSMENT
Patient is an 77 yo M who denies PMHx presents s/p fall at home, found to have left sided pleural effusion

## 2018-12-19 NOTE — ED PROVIDER NOTE - CARE PLAN
Principal Discharge DX:	Dehydration  Secondary Diagnosis:	Pleural effusion  Secondary Diagnosis:	Humeral fracture

## 2018-12-19 NOTE — H&P ADULT - ATTENDING COMMENTS
Pt seen and examined at bedside on 12/19/2018 @ 5 am    Agree with HPI, ROS as above.     VS, Labs, FH, SH, allergies, medications, imaging reviewed. I personally reviewed the CXR - L pleural effusion. I reviewed the patient's charted records - was seen in [facility] ED for fall in 10/2018, reportedly mechanical. Agree with physical exam as above    A/P: Patient is an 77 yo M who denies PMHx presents s/p fall at home, found to have left sided pleural effusion    **Fall  -Likely orthostatic hypotension given history  -Check orthostatics  -Repeat EKG and Echo given physical exam and hx of EtOH abuse  -Check electrolytes    Plan otherwise as outlined above....

## 2018-12-19 NOTE — H&P ADULT - PROBLEM SELECTOR PLAN 9
1.       PCP Contacted on Admission: (Y/N) --> Name & Phone #:  2.       Date of Contact with PCP:  3.       PCP Contacted at Discharge: (Y/N)  4.       Summary of Handoff Given to PCP:  5.       Post-Discharge Appointment Date and Location: [facility] HOLD a/c given possible hemothorax  No indication for stress ulcer ppx at this time    FULL CODE    Dispo: admit to F

## 2018-12-20 LAB
CULTURE RESULTS: SIGNIFICANT CHANGE UP
SPECIMEN SOURCE: SIGNIFICANT CHANGE UP
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
## 2018-12-21 DIAGNOSIS — Z28.21 IMMUNIZATION NOT CARRIED OUT BECAUSE OF PATIENT REFUSAL: ICD-10-CM

## 2018-12-21 DIAGNOSIS — R63.8 OTHER SYMPTOMS AND SIGNS CONCERNING FOOD AND FLUID INTAKE: ICD-10-CM

## 2018-12-21 DIAGNOSIS — S42.202A UNSPECIFIED FRACTURE OF UPPER END OF LEFT HUMERUS, INITIAL ENCOUNTER FOR CLOSED FRACTURE: ICD-10-CM

## 2018-12-21 DIAGNOSIS — I95.1 ORTHOSTATIC HYPOTENSION: ICD-10-CM

## 2018-12-21 DIAGNOSIS — J98.11 ATELECTASIS: ICD-10-CM

## 2018-12-21 DIAGNOSIS — D64.9 ANEMIA, UNSPECIFIED: ICD-10-CM

## 2018-12-21 DIAGNOSIS — E86.0 DEHYDRATION: ICD-10-CM

## 2018-12-21 DIAGNOSIS — E86.1 HYPOVOLEMIA: ICD-10-CM

## 2018-12-21 DIAGNOSIS — Z72.89 OTHER PROBLEMS RELATED TO LIFESTYLE: ICD-10-CM

## 2019-01-03 ENCOUNTER — APPOINTMENT (OUTPATIENT)
Age: 79
End: 2019-01-03
Payer: MEDICARE

## 2019-01-03 ENCOUNTER — OUTPATIENT (OUTPATIENT)
Dept: OUTPATIENT SERVICES | Facility: HOSPITAL | Age: 79
LOS: 1 days | End: 2019-01-03
Payer: MEDICARE

## 2019-01-03 VITALS — HEIGHT: 72 IN | BODY MASS INDEX: 24.38 KG/M2 | WEIGHT: 180 LBS

## 2019-01-03 DIAGNOSIS — S42.215A UNSPECIFIED NONDISPLACED FRACTURE OF SURGICAL NECK OF LEFT HUMERUS, INITIAL ENCOUNTER FOR CLOSED FRACTURE: ICD-10-CM

## 2019-01-03 PROCEDURE — 73030 X-RAY EXAM OF SHOULDER: CPT | Mod: 26,LT

## 2019-01-03 PROCEDURE — 99203 OFFICE O/P NEW LOW 30 MIN: CPT

## 2019-01-03 PROCEDURE — 73030 X-RAY EXAM OF SHOULDER: CPT

## 2019-01-15 VITALS
SYSTOLIC BLOOD PRESSURE: 130 MMHG | HEIGHT: 72 IN | HEART RATE: 73 BPM | RESPIRATION RATE: 14 BRPM | DIASTOLIC BLOOD PRESSURE: 80 MMHG | OXYGEN SATURATION: 98 % | TEMPERATURE: 98 F | WEIGHT: 185.63 LBS

## 2019-01-15 NOTE — PRE-OP CHECKLIST - SELECT TESTS ORDERED
urine C&S, echo, CT chest, CT brain, x-ray left shoulder/UCG/BMP/CBC/INR/CXR/Urinalysis/PT/PTT Urinalysis/BMP/CXR/PT/PTT/CBC/INR/UCG/urine C&S, echo, CT chest, CT brain, x-ray left shoulder MRSA swab and medication

## 2019-01-16 ENCOUNTER — RESULT REVIEW (OUTPATIENT)
Age: 79
End: 2019-01-16

## 2019-01-16 ENCOUNTER — INPATIENT (INPATIENT)
Facility: HOSPITAL | Age: 79
LOS: 1 days | Discharge: HOME | DRG: 483 | End: 2019-01-18
Attending: ORTHOPAEDIC SURGERY | Admitting: ORTHOPAEDIC SURGERY
Payer: MEDICARE

## 2019-01-16 DIAGNOSIS — S42.211A UNSPECIFIED DISPLACED FRACTURE OF SURGICAL NECK OF RIGHT HUMERUS, INITIAL ENCOUNTER FOR CLOSED FRACTURE: ICD-10-CM

## 2019-01-16 PROCEDURE — 73020 X-RAY EXAM OF SHOULDER: CPT | Mod: 26,LT

## 2019-01-16 RX ORDER — INFLUENZA VIRUS VACCINE 15; 15; 15; 15 UG/.5ML; UG/.5ML; UG/.5ML; UG/.5ML
0.5 SUSPENSION INTRAMUSCULAR ONCE
Qty: 0 | Refills: 0 | Status: DISCONTINUED | OUTPATIENT
Start: 2019-01-16 | End: 2019-01-18

## 2019-01-16 RX ORDER — ACETAMINOPHEN 500 MG
650 TABLET ORAL EVERY 6 HOURS
Qty: 0 | Refills: 0 | Status: DISCONTINUED | OUTPATIENT
Start: 2019-01-16 | End: 2019-01-18

## 2019-01-16 RX ORDER — HYDROMORPHONE HYDROCHLORIDE 2 MG/ML
0.5 INJECTION INTRAMUSCULAR; INTRAVENOUS; SUBCUTANEOUS EVERY 4 HOURS
Qty: 0 | Refills: 0 | Status: DISCONTINUED | OUTPATIENT
Start: 2019-01-16 | End: 2019-01-17

## 2019-01-16 RX ORDER — PANTOPRAZOLE SODIUM 20 MG/1
40 TABLET, DELAYED RELEASE ORAL
Qty: 0 | Refills: 0 | Status: DISCONTINUED | OUTPATIENT
Start: 2019-01-16 | End: 2019-01-18

## 2019-01-16 RX ORDER — POLYETHYLENE GLYCOL 3350 17 G/17G
17 POWDER, FOR SOLUTION ORAL DAILY
Qty: 0 | Refills: 0 | Status: DISCONTINUED | OUTPATIENT
Start: 2019-01-16 | End: 2019-01-18

## 2019-01-16 RX ORDER — CEFAZOLIN SODIUM 1 G
2000 VIAL (EA) INJECTION EVERY 8 HOURS
Qty: 0 | Refills: 0 | Status: COMPLETED | OUTPATIENT
Start: 2019-01-16 | End: 2019-01-17

## 2019-01-16 RX ORDER — OXYCODONE HYDROCHLORIDE 5 MG/1
10 TABLET ORAL EVERY 4 HOURS
Qty: 0 | Refills: 0 | Status: DISCONTINUED | OUTPATIENT
Start: 2019-01-16 | End: 2019-01-17

## 2019-01-16 RX ORDER — ASPIRIN/CALCIUM CARB/MAGNESIUM 324 MG
81 TABLET ORAL DAILY
Qty: 0 | Refills: 0 | Status: DISCONTINUED | OUTPATIENT
Start: 2019-01-16 | End: 2019-01-18

## 2019-01-16 RX ORDER — SODIUM CHLORIDE 9 MG/ML
1000 INJECTION, SOLUTION INTRAVENOUS
Qty: 0 | Refills: 0 | Status: DISCONTINUED | OUTPATIENT
Start: 2019-01-16 | End: 2019-01-17

## 2019-01-16 RX ORDER — CEFAZOLIN SODIUM 1 G
2000 VIAL (EA) INJECTION EVERY 8 HOURS
Qty: 0 | Refills: 0 | Status: DISCONTINUED | OUTPATIENT
Start: 2019-01-16 | End: 2019-01-16

## 2019-01-16 RX ORDER — MAGNESIUM HYDROXIDE 400 MG/1
30 TABLET, CHEWABLE ORAL DAILY
Qty: 0 | Refills: 0 | Status: DISCONTINUED | OUTPATIENT
Start: 2019-01-16 | End: 2019-01-18

## 2019-01-16 RX ORDER — MORPHINE SULFATE 50 MG/1
2 CAPSULE, EXTENDED RELEASE ORAL EVERY 4 HOURS
Qty: 0 | Refills: 0 | Status: DISCONTINUED | OUTPATIENT
Start: 2019-01-16 | End: 2019-01-16

## 2019-01-16 RX ORDER — DOCUSATE SODIUM 100 MG
100 CAPSULE ORAL THREE TIMES A DAY
Qty: 0 | Refills: 0 | Status: DISCONTINUED | OUTPATIENT
Start: 2019-01-16 | End: 2019-01-18

## 2019-01-16 RX ORDER — ONDANSETRON 8 MG/1
4 TABLET, FILM COATED ORAL ONCE
Qty: 0 | Refills: 0 | Status: DISCONTINUED | OUTPATIENT
Start: 2019-01-16 | End: 2019-01-17

## 2019-01-16 RX ORDER — ONDANSETRON 8 MG/1
4 TABLET, FILM COATED ORAL EVERY 6 HOURS
Qty: 0 | Refills: 0 | Status: DISCONTINUED | OUTPATIENT
Start: 2019-01-16 | End: 2019-01-17

## 2019-01-16 RX ORDER — MORPHINE SULFATE 50 MG/1
2 CAPSULE, EXTENDED RELEASE ORAL
Qty: 0 | Refills: 0 | Status: DISCONTINUED | OUTPATIENT
Start: 2019-01-16 | End: 2019-01-17

## 2019-01-16 RX ORDER — SENNA PLUS 8.6 MG/1
2 TABLET ORAL AT BEDTIME
Qty: 0 | Refills: 0 | Status: DISCONTINUED | OUTPATIENT
Start: 2019-01-16 | End: 2019-01-18

## 2019-01-16 RX ORDER — OXYCODONE HYDROCHLORIDE 5 MG/1
5 TABLET ORAL EVERY 4 HOURS
Qty: 0 | Refills: 0 | Status: DISCONTINUED | OUTPATIENT
Start: 2019-01-16 | End: 2019-01-17

## 2019-01-16 RX ADMIN — Medication 2000 MILLIGRAM(S): at 19:38

## 2019-01-16 RX ADMIN — Medication 81 MILLIGRAM(S): at 22:03

## 2019-01-16 NOTE — H&P ADULT - NSHPLABSRESULTS_GEN_ALL_CORE
Preop CBC, BMP, PT/PTT/INR, UA - WNL per medical clearance  Nasal swab DOS  Preop EKG - sinus rhythm-  WNL per medical clearance  Preop Echocardiogram EF 66%  Preop CXR - WNL per medical clearance

## 2019-01-16 NOTE — H&P ADULT - HISTORY OF PRESENT ILLNESS
78M c/o left shoulder pain following a left proximal humerus fracture on 12/19.       Present for elective left reverse total shoulder replacement 78M c/o left shoulder pain due to a left proximal humerus fracture s/p mechanical on 12/19. Pt was admitted overnight to medicine service and was medically stable for discharge/surgery the next day. Pt seen and evaluated outpatient for operative planning. Pt has been in LUE sling since time of injury. Denies numbness/tingling/weakness of his left upper extremity. Pt does not ambulate with an assistive device at baseline. Denies DVT hx; denies CP, SOB, N/V, tactile fevers today.      Present for elective left reverse total shoulder replacement

## 2019-01-16 NOTE — H&P ADULT - PROBLEM SELECTOR PLAN 1
Admit to Orthopaedic Service.  Presents today for elective left reverse total shoulder replacement   Pt medically stable and cleared for procedure today by  Admit to Orthopaedic Service.  Presents today for elective left reverse total shoulder replacement   Pt medically stable and cleared for procedure today by Dr. Hammond

## 2019-01-16 NOTE — BRIEF OPERATIVE NOTE - PRE-OP DX
Other closed displaced fracture of proximal end of left humerus, initial encounter  01/16/2019    Active  Michael Mckeon

## 2019-01-16 NOTE — BRIEF OPERATIVE NOTE - PROCEDURE
<<-----Click on this checkbox to enter Procedure Arthroplasty, shoulder, total, reverse  01/16/2019  LEFT  Active  [id number]

## 2019-01-16 NOTE — H&P ADULT - NSHPPHYSICALEXAM_GEN_ALL_CORE
MSK: + Decreased ROM secondary to pain, left shoulder      Remainder of exam per medical clearance note MSK: + Decreased ROM secondary to pain, left shoulder  Sensation intact to bilateral UE distally. Motor Strength 5/5 to /triceps/biceps Right upper extremity; 4/5 left upper extremity. Interossei 5/5 bilaterally AIN/PIN intact.   Skin warm and well perfused; no visible wounds/erythema  Radial pulses 2+    Remainder of exam per medical clearance note

## 2019-01-16 NOTE — PROGRESS NOTE ADULT - SUBJECTIVE AND OBJECTIVE BOX
Orthopedics Post Op Check    Procedure: left reverse TSA   Surgeon: Dr Wei    Pt comfortable, without complaints. Pain well controlled in PACU.   Denies CP, SOB, N/V, numbness/tingling. No complaints.     Vital Signs Last 24 Hrs  T(C): 36.2 (16 Jan 2019 14:20), Max: 36.2 (16 Jan 2019 14:20)  T(F): 97.1 (16 Jan 2019 14:20), Max: 97.1 (16 Jan 2019 14:20)  HR: 80 (16 Jan 2019 15:20) (80 - 88)  BP: 122/80 (16 Jan 2019 15:20) (108/70 - 123/76)  BP(mean): 95 (16 Jan 2019 15:20) (82 - 95)  RR: 18 (16 Jan 2019 15:20) (18 - 20)  SpO2: 93% (16 Jan 2019 15:20) (91% - 93%)  AVSS, NAD      General: Pt Alert and oriented, comfortable  Dressing C/D/I, aquacel in place.  Sling in place   Sensation intact, +tingling to entire left arm - pt had left IS block  Motor ain/pin/ulnar nerves intact BUE   Pulses radial pulses palpable, skin warm and well perfused. cap refill brisk.       Post op XR: s/p left reverse TSA hardware in place    A/P: 78yMale POD#0 s/p left reverse TSA doing well   - Stable, will monitor for block to wear off appropriately  - Pain Control   - DVT ppx: ASA 81   - Jessica-op abx: Ancef   - PT, WBS: WBAT   - F/U AM Labs

## 2019-01-17 DIAGNOSIS — J98.11 ATELECTASIS: ICD-10-CM

## 2019-01-17 DIAGNOSIS — R55 SYNCOPE AND COLLAPSE: ICD-10-CM

## 2019-01-17 DIAGNOSIS — J90 PLEURAL EFFUSION, NOT ELSEWHERE CLASSIFIED: ICD-10-CM

## 2019-01-17 LAB
ALBUMIN SERPL ELPH-MCNC: 3.4 G/DL — SIGNIFICANT CHANGE UP (ref 3.3–5)
ALP SERPL-CCNC: 80 U/L — SIGNIFICANT CHANGE UP (ref 40–120)
ALT FLD-CCNC: 31 U/L — SIGNIFICANT CHANGE UP (ref 10–45)
ANION GAP SERPL CALC-SCNC: 20 MMOL/L — HIGH (ref 5–17)
APTT BLD: 25.3 SEC — LOW (ref 27.5–36.3)
AST SERPL-CCNC: 33 U/L — SIGNIFICANT CHANGE UP (ref 10–40)
BASOPHILS NFR BLD AUTO: 0.2 % — SIGNIFICANT CHANGE UP (ref 0–2)
BILIRUB SERPL-MCNC: 0.7 MG/DL — SIGNIFICANT CHANGE UP (ref 0.2–1.2)
BUN SERPL-MCNC: 9 MG/DL — SIGNIFICANT CHANGE UP (ref 7–23)
CALCIUM SERPL-MCNC: 9 MG/DL — SIGNIFICANT CHANGE UP (ref 8.4–10.5)
CHLORIDE SERPL-SCNC: 96 MMOL/L — SIGNIFICANT CHANGE UP (ref 96–108)
CK MB CFR SERPL CALC: 1.8 NG/ML — SIGNIFICANT CHANGE UP (ref 0–6.7)
CK SERPL-CCNC: 450 U/L — HIGH (ref 30–200)
CO2 SERPL-SCNC: 20 MMOL/L — LOW (ref 22–31)
CREAT SERPL-MCNC: 0.86 MG/DL — SIGNIFICANT CHANGE UP (ref 0.5–1.3)
CRP SERPL-MCNC: 3.02 MG/DL — HIGH (ref 0–0.4)
EOSINOPHIL NFR BLD AUTO: 0.2 % — SIGNIFICANT CHANGE UP (ref 0–6)
GLUCOSE BLDC GLUCOMTR-MCNC: 128 MG/DL — HIGH (ref 70–99)
GLUCOSE BLDC GLUCOMTR-MCNC: 131 MG/DL — HIGH (ref 70–99)
GLUCOSE SERPL-MCNC: 143 MG/DL — HIGH (ref 70–99)
HBA1C BLD-MCNC: 5.1 % — SIGNIFICANT CHANGE UP (ref 4–5.6)
HCT VFR BLD CALC: 36.9 % — LOW (ref 39–50)
HGB BLD-MCNC: 12.5 G/DL — LOW (ref 13–17)
INR BLD: 1.09 — SIGNIFICANT CHANGE UP (ref 0.88–1.16)
LACTATE SERPL-SCNC: 3.4 MMOL/L — HIGH (ref 0.5–2)
LACTATE SERPL-SCNC: 4 MMOL/L — CRITICAL HIGH (ref 0.5–2)
LYMPHOCYTES # BLD AUTO: 19 % — SIGNIFICANT CHANGE UP (ref 13–44)
MAGNESIUM SERPL-MCNC: 1.9 MG/DL — SIGNIFICANT CHANGE UP (ref 1.6–2.6)
MCHC RBC-ENTMCNC: 33.1 PG — SIGNIFICANT CHANGE UP (ref 27–34)
MCHC RBC-ENTMCNC: 33.9 G/DL — SIGNIFICANT CHANGE UP (ref 32–36)
MCV RBC AUTO: 97.6 FL — SIGNIFICANT CHANGE UP (ref 80–100)
MONOCYTES NFR BLD AUTO: 10.7 % — SIGNIFICANT CHANGE UP (ref 2–14)
MRSA PCR RESULT.: SIGNIFICANT CHANGE UP
NEUTROPHILS NFR BLD AUTO: 69.9 % — SIGNIFICANT CHANGE UP (ref 43–77)
PHOSPHATE SERPL-MCNC: 4.6 MG/DL — HIGH (ref 2.5–4.5)
PLATELET # BLD AUTO: 241 K/UL — SIGNIFICANT CHANGE UP (ref 150–400)
POTASSIUM SERPL-MCNC: 4 MMOL/L — SIGNIFICANT CHANGE UP (ref 3.5–5.3)
POTASSIUM SERPL-SCNC: 4 MMOL/L — SIGNIFICANT CHANGE UP (ref 3.5–5.3)
PROT SERPL-MCNC: 6.3 G/DL — SIGNIFICANT CHANGE UP (ref 6–8.3)
PROTHROM AB SERPL-ACNC: 12.3 SEC — SIGNIFICANT CHANGE UP (ref 10–12.9)
RBC # BLD: 3.78 M/UL — LOW (ref 4.2–5.8)
RBC # FLD: 13.1 % — SIGNIFICANT CHANGE UP (ref 10.3–16.9)
S AUREUS DNA NOSE QL NAA+PROBE: SIGNIFICANT CHANGE UP
SODIUM SERPL-SCNC: 136 MMOL/L — SIGNIFICANT CHANGE UP (ref 135–145)
TROPONIN T SERPL-MCNC: <0.01 NG/ML — SIGNIFICANT CHANGE UP (ref 0–0.01)
WBC # BLD: 10.1 K/UL — SIGNIFICANT CHANGE UP (ref 3.8–10.5)
WBC # FLD AUTO: 10.1 K/UL — SIGNIFICANT CHANGE UP (ref 3.8–10.5)

## 2019-01-17 PROCEDURE — 99222 1ST HOSP IP/OBS MODERATE 55: CPT

## 2019-01-17 PROCEDURE — 71045 X-RAY EXAM CHEST 1 VIEW: CPT | Mod: 26

## 2019-01-17 PROCEDURE — 99223 1ST HOSP IP/OBS HIGH 75: CPT

## 2019-01-17 PROCEDURE — 93306 TTE W/DOPPLER COMPLETE: CPT | Mod: 26

## 2019-01-17 PROCEDURE — 99223 1ST HOSP IP/OBS HIGH 75: CPT | Mod: GC

## 2019-01-17 RX ORDER — SODIUM CHLORIDE 9 MG/ML
1000 INJECTION, SOLUTION INTRAVENOUS
Qty: 0 | Refills: 0 | Status: DISCONTINUED | OUTPATIENT
Start: 2019-01-17 | End: 2019-01-18

## 2019-01-17 RX ORDER — SODIUM CHLORIDE 9 MG/ML
1000 INJECTION INTRAMUSCULAR; INTRAVENOUS; SUBCUTANEOUS ONCE
Qty: 0 | Refills: 0 | Status: COMPLETED | OUTPATIENT
Start: 2019-01-17 | End: 2019-01-17

## 2019-01-17 RX ORDER — ZALEPLON 10 MG
5 CAPSULE ORAL AT BEDTIME
Qty: 0 | Refills: 0 | Status: DISCONTINUED | OUTPATIENT
Start: 2019-01-17 | End: 2019-01-18

## 2019-01-17 RX ADMIN — Medication 81 MILLIGRAM(S): at 12:15

## 2019-01-17 RX ADMIN — PANTOPRAZOLE SODIUM 40 MILLIGRAM(S): 20 TABLET, DELAYED RELEASE ORAL at 05:51

## 2019-01-17 RX ADMIN — Medication 2000 MILLIGRAM(S): at 05:50

## 2019-01-17 RX ADMIN — Medication 100 MILLIGRAM(S): at 23:20

## 2019-01-17 RX ADMIN — Medication 5 MILLIGRAM(S): at 23:29

## 2019-01-17 NOTE — OCCUPATIONAL THERAPY INITIAL EVALUATION ADULT - GENERAL OBSERVATIONS, REHAB EVAL
Patient cleared for OT evaluation by BRITT Bergeron and Ortho JOSE Reyes. Patient received semi-supine, NAD, +tele, +EEG, +IV heplock, +LUE in sling.

## 2019-01-17 NOTE — OCCUPATIONAL THERAPY INITIAL EVALUATION ADULT - ANTICIPATED DISCHARGE DISPOSITION, OT EVAL
no needs/full evaluation limited 2/2 earlier events of orthostatic hypotension. Will need to f/u to address functional mobility and ADLs.

## 2019-01-17 NOTE — CONSULT NOTE ADULT - PROBLEM SELECTOR RECOMMENDATION 2
The initial CT scan of the chest revealed moderate  pleural effusion with evidence of pleural calcification and plaques. There are 2 chest x-ray revealed increase in the left pleural effusion or possible shifting of the mediastinum. The patient is clinically stable and not in respiratory distress and Baseline oxygen saturation was normal. The etiology of the effusion is unclear at this point. Patient has multiple falls I doubt this is hemothorax. Plan for thoracentesis tomorrow as diagnostic

## 2019-01-17 NOTE — CONSULT NOTE ADULT - PROBLEM SELECTOR RECOMMENDATION 9
The patient has most likely vasovagal episode. The patient gave a history of orthostatic hypertension and multiple folds. Repeat echocardiogram did not reveal any abnormal valvular disease. Followup on carotid Doppler and EEG as elected to have some increased. Unlikely the patient has underlying seizure disorder. CT scan of abdomen she was negative. Patient might need MRI of the head

## 2019-01-17 NOTE — OCCUPATIONAL THERAPY INITIAL EVALUATION ADULT - ADDITIONAL COMMENTS
prior to fall in December, patient was independent. Since fall, wife has been assisting with ADLs as necessary.

## 2019-01-17 NOTE — PROGRESS NOTE ADULT - SUBJECTIVE AND OBJECTIVE BOX
Orthopedics Progress Note      Pt comfortable, with rapid response team after   Denies CP, SOB, N/V, numbness/tingling. No complaints.     Vital Signs Last 24 Hrs  T(C): 36.4 (17 Jan 2019 10:05), Max: 36.8 (16 Jan 2019 19:02)  T(F): 97.5 (17 Jan 2019 10:05), Max: 98.2 (16 Jan 2019 19:02)  HR: 80 (17 Jan 2019 08:30) (78 - 104)  BP: 115/67 (17 Jan 2019 08:30) (82/59 - 150/94)  BP(mean): 84 (17 Jan 2019 08:30) (66 - 109)  RR: 16 (17 Jan 2019 08:30) (14 - 22)  SpO2: 96% (17 Jan 2019 08:30) (88% - 98%)    General: Pt Alert and oriented, comfortable  Dressing C/D/I, aquacel in place.  Sling in place   Sensation intact  Motor ax/rad/musc/ain/pin/ulnar nerves intact BUE   Pulses radial pulses palpable, skin warm and well perfused. cap refill brisk.       Post op XR: s/p left reverse TSA hardware in place    A/P: 78yMale POD#1 s/p left reverse TSA, pt had rapid response x 2 this AM  - likely vasovagal vs orthostatic hypotension vs possible seizure activity  - neurology, cardiac input appreciated  - transfer to [facility], will monitor for block to wear off appropriately  - Pain Control   - DVT ppx: ASA 81   - Jessica-op abx: Ancef   - PT, WBS: WBAT   - F/U AM Labs

## 2019-01-17 NOTE — OCCUPATIONAL THERAPY INITIAL EVALUATION ADULT - PERTINENT HX OF CURRENT PROBLEM, REHAB EVAL
78M c/o left shoulder pain due to a left proximal humerus fracture s/p mechanical on 12/19. Pt was admitted overnight to medicine service and was medically stable for discharge/surgery the next day. Pt seen and evaluated outpatient for operative planning. Pt has been in LUE sling since time of injury. S/p Left total, reverse shoulder arthoplasty 1/16/19.

## 2019-01-17 NOTE — CONSULT NOTE ADULT - ATTENDING COMMENTS
Patient seen and examined with house-staff during bedside rounds.  Resident note read, including vitals, physical findings, laboratory data, and radiological reports.   Revisions included below.  Direct personal management at bed side and extensive interpretation of the data.  Plan was outlined and discussed in details with the housestaff.  Decision making of high complexity  Action taken for acute disease activity to reflect the level of care provided:  - medication reconciliation  - review laboratory data  I discussed the case with cardiology and orthopedic multiple times

## 2019-01-17 NOTE — OCCUPATIONAL THERAPY INITIAL EVALUATION ADULT - MANUAL MUSCLE TESTING RESULTS, REHAB EVAL
Right Upper Extremity grossly 5/5 throughout. Left Upper Extremity  4/5, wrist, elbow, shoulder N/T.

## 2019-01-17 NOTE — CONSULT NOTE ADULT - SUBJECTIVE AND OBJECTIVE BOX
REASON FOR CONSULT:    HISTORY OF PRESENT ILLNESS:  78M c/o left shoulder pain due to a left proximal humerus fracture s/p mechanical on 12/19. Pt was admitted overnight to medicine service and was medically stable for discharge/surgery the next day. Pt seen and evaluated outpatient for operative planning. Pt has been in LUE sling since time of injury. Denies numbness/tingling/weakness of his left upper extremity. Pt does not ambulate with an assistive device at baseline. Denies DVT hx; denies CP, SOB, N/V, tactile fevers today.      Present for elective left reverse total shoulder replacement    PAST MEDICAL & SURGICAL HISTORY:  No pertinent past medical history  No significant past surgical history      [ ] Diabetes   [ ] Hypertension  [ ] Hyperlipidemia  [ ] CAD  [ ] PCI  [ ] CABG    PREVIOUS DIAGNOSTIC TESTING:    [ ] Echocardiogram:  [ ]  Catheterization:  [ ] Stress Test:  	    MEDICATIONS:        acetaminophen   Tablet .. 650 milliGRAM(s) Oral every 6 hours PRN    aluminum hydroxide/magnesium hydroxide/simethicone Suspension 30 milliLiter(s) Oral four times a day PRN  docusate sodium 100 milliGRAM(s) Oral three times a day  magnesium hydroxide Suspension 30 milliLiter(s) Oral daily PRN  pantoprazole    Tablet 40 milliGRAM(s) Oral before breakfast  polyethylene glycol 3350 17 Gram(s) Oral daily  senna 2 Tablet(s) Oral at bedtime PRN      aspirin enteric coated 81 milliGRAM(s) Oral daily  influenza   Vaccine 0.5 milliLiter(s) IntraMuscular once  sodium chloride 0.9% Bolus 1000 milliLiter(s) IV Bolus once      FAMILY HISTORY:  No pertinent family history in first degree relatives      SOCIAL HISTORY:    [ x] Non-smoker  [ ] Smoker  [ ] Alcohol    FAMILY HX: NC    Allergies    No Known Allergies    Intolerances    	    REVIEW OF SYSTEMS:    [x] as per HPI  CONSTITUTIONAL: No fever, weight loss, or fatigue  ENT:  No difficulty hearing, tinnitus, vertigo; No sinus or throat pain  RESPIRATORY: No cough, wheezing, chills or hemoptysis; No Shortness of Breath  CARDIOVASCULAR: No chest pain, palpitations, dizziness, or leg swelling  GASTROINTESTINAL: No abdominal or epigastric pain. No nausea, vomiting, or hematemesis; No diarrhea or constipation. No melena or hematochezia.  GENITOURINARY: No dysuria, frequency, hematuria, or incontinence  NEUROLOGICAL: No headaches, memory loss, loss of strength, numbness, or tremors  MUSCULOSKELETAL: No joint pain or swelling; No muscle, back, or extremity pain  [x] All others negative	  [ ] Unable to obtain    PHYSICAL EXAM:  T(C): 35.4 (01-17-19 @ 07:45), Max: 36.8 (01-16-19 @ 19:02)  HR: 81 (01-17-19 @ 07:45) (78 - 104)  BP: 108/62 (01-17-19 @ 07:45) (82/59 - 150/94)  RR: 18 (01-17-19 @ 07:45) (14 - 22)  SpO2: 98% (01-17-19 @ 07:45) (88% - 98%)  Wt(kg): --  I&O's Summary    16 Jan 2019 07:01  -  17 Jan 2019 07:00  --------------------------------------------------------  IN: 400 mL / OUT: 1100 mL / NET: -700 mL        Appearance: Normal	  HEENT:   Normal oral mucosa, PERRL, EOMI	  Lymphatic: No lymphadenopathy  Cardiovascular: Normal S1 S2, No JVD, No murmurs, No edema  Respiratory: Lungs clear to auscultation	  Psychiatry: A & O x 3, Mood & affect appropriate  Gastrointestinal:  Soft, Non-tender, + BS	  Skin: No rashes, No ecchymoses, No cyanosis	  Neurologic: Non-focal  Extremities: Normal range of motion, No clubbing, cyanosis or edema  Vascular: Peripheral pulses palpable 2+ bilaterally    TELEMETRY: 	    ECG:    ECHO:  STRESS:  CATH:  	  RADIOLOGY:  CXR:  CT:  US:   	  	  LABS:	 	    CARDIAC MARKERS:                                  12.5   10.1  )-----------( 241      ( 17 Jan 2019 07:00 )             36.9     01-17    136  |  96  |  9   ----------------------------<  143<H>  4.0   |  20<L>  |  0.86    Ca    9.0      17 Jan 2019 07:00  Phos  4.6     01-17  Mg     1.9     01-17    TPro  6.3  /  Alb  3.4  /  TBili  0.7  /  DBili  x   /  AST  33  /  ALT  31  /  AlkPhos  80  01-17    proBNP:   Lipid Profile:   HgA1c:   TSH:     ASSESSMENT/PLAN: 	    # vasovagal - cardiodepressive and vasoinhibitory effect when oob.   +orthostatic  tele no events no pauses no rvr   12 lead ekg no s/s cad  recommed: agressive IV/PO hydration until orthostatics negative.  BID othostatic checks    Preop EKG - sinus rhythm-  WNL per medical clearance  	Preop Echocardiogram EF 66%  Preop CXR - WNL per medical clearance

## 2019-01-17 NOTE — CONSULT NOTE ADULT - SUBJECTIVE AND OBJECTIVE BOX
Patient is a 78y old  Male who presents with a chief complaint of left shoulder pain (17 Jan 2019 11:59)      HPI:  78M c/o left shoulder pain due to a left proximal humerus fracture s/p mechanical on 12/19. Pt was admitted overnight to medicine service and was medically stable for discharge/surgery the next day. Pt seen and evaluated outpatient for operative planning. Pt has been in LUE sling since time of injury. Denies numbness/tingling/weakness of his left upper extremity. Pt does not ambulate with an assistive device at baseline. Denies DVT hx; denies CP, SOB, N/V, tactile fevers today.    The patient stated that has been getting dizzy and the blood pressure drops when he stands up. This will be passed after a bowel movement. Now is doing well and not short of breath and doesn't have chest  Present for elective left reverse total shoulder replacement (16 Jan 2019 08:38)      PAST MEDICAL & SURGICAL HISTORY:  No pertinent past medical history  No significant past surgical history      FAMILY HISTORY:  No pertinent family history in first degree relatives      SOCIAL HISTORY:  Smoking Status: [ ] Current, [ ] Former, [ ] Never  Pack Years:    MEDICATIONS:  Pulmonary:    Antimicrobials:    Anticoagulants:  aspirin enteric coated 81 milliGRAM(s) Oral daily    Onc:    GI/:  aluminum hydroxide/magnesium hydroxide/simethicone Suspension 30 milliLiter(s) Oral four times a day PRN  docusate sodium 100 milliGRAM(s) Oral three times a day  magnesium hydroxide Suspension 30 milliLiter(s) Oral daily PRN  pantoprazole    Tablet 40 milliGRAM(s) Oral before breakfast  polyethylene glycol 3350 17 Gram(s) Oral daily  senna 2 Tablet(s) Oral at bedtime PRN    Endocrine:    Cardiac:    Other Medications:  acetaminophen   Tablet .. 650 milliGRAM(s) Oral every 6 hours PRN  influenza   Vaccine 0.5 milliLiter(s) IntraMuscular once  lactated ringers. 1000 milliLiter(s) IV Continuous <Continuous>      Allergies    No Known Allergies    Intolerances        Vital Signs Last 24 Hrs  T(C): 36.9 (17 Jan 2019 17:14), Max: 37.1 (17 Jan 2019 14:30)  T(F): 98.5 (17 Jan 2019 17:14), Max: 98.8 (17 Jan 2019 14:30)  HR: 96 (17 Jan 2019 16:45) (80 - 96)  BP: 128/73 (17 Jan 2019 16:45) (108/62 - 150/94)  BP(mean): 95 (17 Jan 2019 16:45) (82 - 102)  RR: 16 (17 Jan 2019 13:26) (15 - 18)  SpO2: 92% (17 Jan 2019 16:45) (92% - 98%)    01-16 @ 07:01 - 01-17 @ 07:00  --------------------------------------------------------  IN: 400 mL / OUT: 1100 mL / NET: -700 mL    01-17 @ 07:01 - 01-17 @ 19:27  --------------------------------------------------------  IN: 0 mL / OUT: 600 mL / NET: -600 mL          LABS:      CBC Full  -  ( 17 Jan 2019 07:00 )  WBC Count : 10.1 K/uL  Hemoglobin : 12.5 g/dL  Hematocrit : 36.9 %  Platelet Count - Automated : 241 K/uL  Mean Cell Volume : 97.6 fL  Mean Cell Hemoglobin : 33.1 pg  Mean Cell Hemoglobin Concentration : 33.9 g/dL  Auto Neutrophil # : x  Auto Lymphocyte # : x  Auto Monocyte # : x  Auto Eosinophil # : x  Auto Basophil # : x  Auto Neutrophil % : 69.9 %  Auto Lymphocyte % : 19.0 %  Auto Monocyte % : 10.7 %  Auto Eosinophil % : 0.2 %  Auto Basophil % : 0.2 %    01-17    136  |  96  |  9   ----------------------------<  143<H>  4.0   |  20<L>  |  0.86    Ca    9.0      17 Jan 2019 07:00  Phos  4.6     01-17  Mg     1.9     01-17    TPro  6.3  /  Alb  3.4  /  TBili  0.7  /  DBili  x   /  AST  33  /  ALT  31  /  AlkPhos  80  01-17    PT/INR - ( 17 Jan 2019 07:00 )   PT: 12.3 sec;   INR: 1.09          PTT - ( 17 Jan 2019 07:00 )  PTT:25.3 sec          < from: CT Chest No Cont (12.19.18 @ 04:30) >  EXAM:  CT CHEST                          PROCEDURE DATE:  12/19/2018          INTERPRETATION:  CT Chest    History: Evaluate effusion seen on chest x-ray. Fall, dizziness, rule out   pneumonia.    Technique: CT scan of chest performed from lung apices through lung   bases.  1 mm thick axial images, axial MIPS, and sagittal and coronal   reformatted images were produced. Intravenous contrast was not   administered, as ordered.    Comparison: None.    Findings:     Lungs and large airways: There are a few calcified micronodules   throughout the lungs, likely the sequela of granulomatous disease.     Pleura:  Moderate left pleural effusion of simple fluid density with   adjacent consolidation which likely represents compressive atelectasis.   Calcified plaque/radiopaque density on the left diaphragmatic pleura and   at the lung apices may represent asbestos-related disease.    Adenopathy: No thoracic lymphadenopathy.    Heart and pericardium:  Heart size is normal. No pericardial effusion.  Calcification of the aortic and mitral valves. Decreased density of the   blood in the heart as compared to the myocardium, consistent with anemia.    Vessels:  Heavy coronary artery calcifications. No aortic dilatation.    Chest wall and lower neck:  Subcentimeter hypodense right thyroid nodule.    Upper abdomen: Small hiatal hernia. Hepatic steatosis. 4.9 x 4.4 x 4.8 cm   hypodense lesion in hepatic segment 3 likely represents cyst versus   hemangioma. 1.2 cm cyst in the right hepatic lobe.    Bones:  Partially imaged displaced fracture of the left proximal humerus.   Mild soft tissue infiltration and swelling of the left anterolateral   chest wall and axilla. No acute rib fracture. Multiple old left-sided rib   fractures. Degenerative changes the spine.    Impression:   1. Partially imaged displaced fracture of the left proximal humerus. Soft   tissue infiltration and swelling of the left anterolateral chest wall and   axilla. No displaced rib fracture.    2. Moderate left pleural effusion of simple fluid density with underlying   atelectasis.    3. Anemia.    < end of copied text >          < from: Xray Chest 1 View- PORTABLE-Urgent (01.17.19 @ 07:48) >    EXAM:  XR CHEST PORTABLE URGENT 1V                          PROCEDURE DATE:  01/17/2019          INTERPRETATION:      XR CHEST PORTABLE URGENT 1V dated 1/17/2019 7:48 AM    CLINICAL INFORMATION: Male, 78 years old.  rapid response; evaluate   pleural effusion.    PRIOR STUDIES: 12/19/2018    FINDINGS: Increasing left-sided pleural effusion. Underlying pneumonia   and/or atelectasis cannot be excluded. The cardiomediastinal structures   are essentially unchanged. Linear calcific opacity projecting over the   left lower lung zone unchanged prior imaging. And not well visualized in   the lateral view. Please see chest CT dated 12/19/2018. Multiple punctate   calcific like opacities are noted in the right perihilar region which may   represent small calcified granulomata. The patient is post interval left   total shoulder prosthesis with expected postsurgical change. No definite   pneumothorax.    IMPRESSION:    1. Increasing left-sided pleural effusion. Underlying left mid and or   lower lobe pneumonia/atelectasis cannot be excluded.  2. Postsurgical changes from a left total shoulder prosthesis.    < from: Echocardiogram (01.17.19 @ 12:59) >    EXAM:  ECHOCARDIOGRAM (CARDIOL)                          PROCEDURE DATE:  01/17/2019          INTERPRETATION:  Patient Height: 182.9 cm  Patient Weight: 84.0 kg  Heart Rate: 89 bpm  Systolic Pressure: 120 mmHg  Diastolic Pressure: 76 mmHg  BSA: 2.1m^2  Interpretation Summary  There is mild asymmetric septal ventricular hypertrophy.There is no   evidence   for left ventricular outflow obstruction.Probably normal left   ventricular wall   motion.Tissue doppler consistent with abnormal LV relaxation.The left   ventricular ejection fraction is estimated to be 60-65%The left atrial   size is   normal.Right atrial size is normal.The right ventricle is normal in size   and   function.Number of aortic valve cusps not well visualized.No evidence   for any   hemodynamically significant valvular disease.The inferior vena cava is   normal   in size (<2.1 cm) with normal inspiratory collapse (>50%) consistent with   normal right atrial pressure.  No doppler evidence of aortic   coarctation.Small   pericardial effusion adjacent to LV best seen in subcostal view.No   chamber   collapse seen.There is no echocardiographic evidence for cardiac   tamponade.    < end of copied text >  < from: Echocardiogram (01.17.19 @ 12:59) >    EXAM:  ECHOCARDIOGRAM (CARDIOL)                          PROCEDURE DATE:  01/17/2019          INTERPRETATION:  Patient Height: 182.9 cm  Patient Weight: 84.0 kg  Heart Rate: 89 bpm  Systolic Pressure: 120 mmHg  Diastolic Pressure: 76 mmHg  BSA: 2.1m^2  Interpretation Summary  There is mild asymmetric septal ventricular hypertrophy.There is no   evidence   for left ventricular outflow obstruction.Probably normal left   ventricular wall   motion.Tissue doppler consistent with abnormal LV relaxation.The left   ventricular ejection fraction is estimated to be 60-65%The left atrial   size is   normal.Right atrial size is normal.The right ventricle is normal in size   and   function.Number of aortic valve cusps not well visualized.No evidence   for any   hemodynamically significant valvular disease.The inferior vena cava is   normal   in size (<2.1 cm) with normal inspiratory collapse (>50%) consistent with   normal right atrial pressure.  No doppler evidence of aortic   coarctation.Small   pericardial effusion adjacent to LV best seen in subcostal view.No   chamber   collapse seen.There is no echocardiographic evidence for cardiac   tamponade.    < end of copied text >    < end of copied text >  RADIOLOGY & ADDITIONAL STUDIES (The following images were personally reviewed):

## 2019-01-17 NOTE — PROGRESS NOTE ADULT - SUBJECTIVE AND OBJECTIVE BOX
POST OPERATIVE DAY # 1 left reverse TSA   SUBJECTIVE: Patient seen and examined.  S/p two rapid responses this AM for presumed orthostatic hypotension, work up in progress. Pt states he feels well and is drinking fluids. He notes his dizziness with standing has been happening to him for several weeks and began around the time he fractured his arm. He notes his fingers remain with +tingling and he has no pain in his left arm.   Denies chest pain/SOB/dizziness/n/v/HA   Pain well controlled.       OBJECTIVE:     Vital Signs Last 24 Hrs  T(C): 36.4 (17 Jan 2019 10:05), Max: 36.8 (16 Jan 2019 19:02)  T(F): 97.5 (17 Jan 2019 10:05), Max: 98.2 (16 Jan 2019 19:02)  HR: 80 (17 Jan 2019 08:30) (78 - 104)  BP: 115/67 (17 Jan 2019 08:30) (82/59 - 150/94)  BP(mean): 84 (17 Jan 2019 08:30) (66 - 109)  RR: 16 (17 Jan 2019 08:30) (14 - 22)  SpO2: 96% (17 Jan 2019 08:30) (88% - 98%)    PE:   Dressing: clean/dry/intact- aquacel, Sling in place.  EEG leads being placed at time of interview.         Sensation: intact to light touch but less sensation to left fingers compared to right.          Motor exam:  firing AIN/PIN/U nerves BUE. Wrist flex/ext intact.          Skin warm, well-perfused b/l hands and fingers; capillary refill brisk; radial pulses 2+ BUE.            LABS:                        12.5   10.1  )-----------( 241      ( 17 Jan 2019 07:00 )             36.9     01-17    136  |  96  |  9   ----------------------------<  143<H>  4.0   |  20<L>  |  0.86    Ca    9.0      17 Jan 2019 07:00  Phos  4.6     01-17  Mg     1.9     01-17    TPro  6.3  /  Alb  3.4  /  TBili  0.7  /  DBili  x   /  AST  33  /  ALT  31  /  AlkPhos  80  01-17  PT/INR - ( 17 Jan 2019 07:00 )   PT: 12.3 sec;   INR: 1.09     PTT - ( 17 Jan 2019 07:00 )  PTT:25.3 sec      ASSESSMENT AND PLAN: 78M POD 1 left reverse TSA with rapid response x 2 this AM  1. Currently on EEG monitor, will follow up  2. Echo and carotid doppler  pending- on way to echo now   3. Will encourage PO intact, aggressive IV hydration/bolus  4.  Analgesic pain control  5. Appreciate Med, Cardio, Neuro recs   6. DVT prophylaxis: ASA 81   7. Weight Bearing Status:  wbat lue   8. Disposition: home when cleared

## 2019-01-17 NOTE — CONSULT NOTE ADULT - ASSESSMENT
78M with no PMH who presented on 12/19 for left proximal humerus fracture s/p fall at home. S/p L elective left reversal total shoulder replacement on 1/16 without complication. Upgraded to telemetry unit this morning for syncopal episode with shaking of extremities c/f seizure activity with history of 2-3 prior episodes of syncope, w/u has been nonrevealing.    Syncope- likely vasovagal given presyncopal symptoms and bearing down prior to event and given that patient likely dehydrated and on pain medications that can precipitate this type of syncopal event. Syncope w/u has been nonrevealing in the past. Low suspicion for seizure activity; patient with no hx of seizures and no risk factors. Myoclonic jerks common in vasovagal syncope 2/2 transient brain hypoxia.  - IVF rehydration   - VEEG to rule out seizures  - orthostatics daily with PRN fluid boluses and encourage PO intake  - fall precautions, PT    NOTE INCOMPLETE 78M with no PMH who presented on 12/19 for left proximal humerus fracture s/p fall at home. S/p L elective left reversal total shoulder replacement on 1/16 without complication. Upgraded to telemetry unit this morning for syncopal episode with shaking of extremities c/f seizure activity with history of 2-3 prior episodes of syncope, w/u has been nonrevealing.    Syncope- strong suspicion for vasovagal syncope. Pt had presyncopal symptoms (lightheadedness, faintness) and was bearing down prior to event which is consistent w vasovagal episode. No confusion, convulsions or tongue biting. There was a question of urinary incontinence which can occur in vasovagal syncope d/t relaxation of muscles, dewayne with full bladder. Event precipitated by dehydrated, pain medications, valsalva. Syncope w/u has been nonrevealing in the past. Low suspicion for seizure activity; patient with no hx of seizures and no risk factors. Myoclonic jerks common in vasovagal syncope 2/2 transient brain hypoxia.  - IVF rehydration with orthostatics to ensure proper hydration  - should make slow transitions from lying-seated-standing positions  - continue to monitor on tele to rule cardiac arrythmia as cause of syncope, although unlikely  - VEEG to rule out seizures, although unlikely. Can remove if negative 24hrs.  - send ESR, CRP, B12, A1c, SPEP, immunofixation for neuropathy w/u to rule out autonomic dysfunction as cause  - fall precautions    Neurology will sign off. Reconsult with ?

## 2019-01-17 NOTE — CONSULT NOTE ADULT - SUBJECTIVE AND OBJECTIVE BOX
Neurology consult     Patient is a 78y old  Male who presents with a chief complaint of left shoulder pain (16 Jan 2019 15:40)      HPI:  78M with no PMH who presented on 12/19 for left proximal humerus fracture s/p mechanical fall. Pt was admitted overnight to medicine service and was medically stable for discharge/surgery the next day. Pt seen and evaluated outpatient for operative planning. Patient presented to [facility] on 1/16 for elective left reversal total shoulder replacement. Surgery without complications. Denies numbness/tingling/weakness of his left upper extremity.     Neurology HPI: Patient states that this morning he felt well, in his normal state of health. He walked with assistance to the bathroom (about 8 steps) and sat on the toilet. Moments later he started to feel lightheaded and "faint" and lowered himself to the ground without trauma. He remembers the entire event and denied LOC. He denied confusion following the event, chest pain, palpitations, SOB, diaphoresis. He denies bowel or urinary incontinence, although stating that he may have peed a little. He admits that he had not eaten or drank anything significant in 2 days because of the surgery. He was then assisted to the bed and was seated on the bedside commode. He had a bowel movement but then again felt lightheaded so he was assisted to the bed. Upon further questioning, patient admits to two previous similar episodes in the last 2-3 months. THe first episode was while walking home, he fell while walking and hit his head on the ground prompting an ED visit. He was drinking wine that day, and does remember the evens of the fall, but denied feeling lightheaded, dizzy, CP, palpitations abd pain, n/v/d prior to the event. His w/u at [facility] was negative including TTE. The next episode was at home, after he had been drinking excessively fell to the ground and woke up on the floor. Again he did not remember the events of the fall, but remembers going to bed afterwards and waking up the next day without the ability to move his arm.       REVIEW OF SYSTEMS:  Constitutional: No fever, chills, fatigue, weakness  Eyes: no eye pain, visual disturbances, or discharge  ENT:  No difficulty hearing, tinnitus, vertigo; No sinus or throat pain  Neck: No pain or stiffness  Respiratory: No cough, dyspnea, wheezing   Cardiovascular: No chest pain, palpitations,   Gastrointestinal: No abdominal or epigastric pain. No nausea, vomiting  No diarrhea or constipation.   Genitourinary: No dysuria, frequency, hematuria or incontinence  Neurological: No headaches, lightheadedness, vertigo, numbness or tremors  Psychiatric: No depression, anxiety, mood swings or difficulty sleeping  Musculoskeletal: No joint pain or swelling; No muscle, back or extremity pain  Skin: No itching, burning, rashes or lesions   Lymph Nodes: No enlarged glands  Endocrine: No heat or cold intolerance; No hair loss, No h/o diabetes or thyroid dysfunction  Allergy and Immunologic: No hives or eczema    MEDICATIONS  Home Medications:  aspirin 81 mg oral tablet: 1 tab(s) orally once a day (11 Oct 2018 01:34)      PMH: No pertinent past medical history       PSH: No significant past surgical history      Family history: No history of dementia, strokes, or seizures   FAMILY HISTORY:  No pertinent family history in first degree relatives      SOCIAL HISTORY:  No history of tobacco or alcohol use     Allergies    No Known Allergies    Intolerances        Height (cm): 182.88 (01-16 @ 09:18)  Weight (kg): 84.2 (01-16 @ 09:18)  BMI (kg/m2): 25.2 (01-16 @ 09:18)    Vital Signs Last 24 Hrs  T(C): 35.4 (17 Jan 2019 07:45), Max: 36.8 (16 Jan 2019 19:02)  T(F): 95.7 (17 Jan 2019 07:45), Max: 98.2 (16 Jan 2019 19:02)  HR: 81 (17 Jan 2019 07:45) (78 - 104)  BP: 108/62 (17 Jan 2019 07:45) (82/59 - 150/94)  BP(mean): 109 (16 Jan 2019 19:02) (66 - 109)  RR: 18 (17 Jan 2019 07:45) (14 - 22)  SpO2: 98% (17 Jan 2019 07:45) (88% - 98%)      Physical Exam:                LABS:                        12.5   10.1  )-----------( 241      ( 17 Jan 2019 07:00 )             36.9     01-17    136  |  96  |  9   ----------------------------<  143<H>  4.0   |  20<L>  |  0.86    Ca    9.0      17 Jan 2019 07:00  Phos  4.6     01-17  Mg     1.9     01-17    TPro  6.3  /  Alb  3.4  /  TBili  0.7  /  DBili  x   /  AST  33  /  ALT  31  /  AlkPhos  80  01-17    LIVER FUNCTIONS - ( 17 Jan 2019 07:00 )  Alb: 3.4 g/dL / Pro: 6.3 g/dL / ALK PHOS: 80 U/L / ALT: 31 U/L / AST: 33 U/L / GGT: x           Hemoglobin A1C:       PT/INR - ( 17 Jan 2019 07:00 )   PT: 12.3 sec;   INR: 1.09          PTT - ( 17 Jan 2019 07:00 )  PTT:25.3 sec        RADIOLOGY  CT[facility]   MRI:

## 2019-01-18 ENCOUNTER — TRANSCRIPTION ENCOUNTER (OUTPATIENT)
Age: 79
End: 2019-01-18

## 2019-01-18 VITALS — TEMPERATURE: 99 F

## 2019-01-18 DIAGNOSIS — R55 SYNCOPE AND COLLAPSE: ICD-10-CM

## 2019-01-18 PROCEDURE — 95951: CPT | Mod: 26

## 2019-01-18 PROCEDURE — 93880 EXTRACRANIAL BILAT STUDY: CPT | Mod: 26

## 2019-01-18 PROCEDURE — 99232 SBSQ HOSP IP/OBS MODERATE 35: CPT

## 2019-01-18 RX ORDER — DOCUSATE SODIUM 100 MG
1 CAPSULE ORAL
Qty: 0 | Refills: 0 | COMMUNITY
Start: 2019-01-18

## 2019-01-18 RX ORDER — SENNA PLUS 8.6 MG/1
2 TABLET ORAL
Qty: 0 | Refills: 0 | COMMUNITY
Start: 2019-01-18

## 2019-01-18 RX ORDER — ACETAMINOPHEN 500 MG
2 TABLET ORAL
Qty: 0 | Refills: 0 | COMMUNITY
Start: 2019-01-18

## 2019-01-18 RX ADMIN — PANTOPRAZOLE SODIUM 40 MILLIGRAM(S): 20 TABLET, DELAYED RELEASE ORAL at 06:20

## 2019-01-18 RX ADMIN — POLYETHYLENE GLYCOL 3350 17 GRAM(S): 17 POWDER, FOR SOLUTION ORAL at 12:17

## 2019-01-18 RX ADMIN — Medication 100 MILLIGRAM(S): at 06:20

## 2019-01-18 RX ADMIN — SODIUM CHLORIDE 100 MILLILITER(S): 9 INJECTION, SOLUTION INTRAVENOUS at 06:20

## 2019-01-18 RX ADMIN — Medication 81 MILLIGRAM(S): at 12:17

## 2019-01-18 NOTE — DISCHARGE NOTE ADULT - PLAN OF CARE
Improvement in pain and ambulation after surgery No strenuous activity, heavy lifting, driving or returning to work until cleared by MD.  You may shower - dressing is water-resistant, no soaking in bathtubs.  Remove dressing after post op day 5-7, then leave incision open to air. Keep incision clean and dry.  Try to have regular bowel movements, take stool softener or laxative if necessary.  May take Pepcid or Zantac for upset stomach  May take Aleve or Naproxen. Swelling may travel all the way down to hand, this is normal and will subside in a few weeks.  Call to schedule an appt with Dr. Wei for follow up, if you have staples or sutures they will be removed in office.  Contact your doctor if you experience: fever greater than 101.5, chills, chest pain, difficulty breathing, redness or excessive drainage around the incision, other concerns.    **Pt. would like to leave today and not proceed with thoracentesis (or any other tests) as per Dr. Sykes recommended for increased pleural effusion on left side. Pt. has been advised to stay in house for procedure but would like to follow up with specialist after discharge (pt. is aware of effusion from 12/18 and was told to follow up in 6m but will do so after discharge). *******

## 2019-01-18 NOTE — DISCHARGE NOTE ADULT - HOSPITAL COURSE
Admitted  Surgery  Jessica-op Antibiotics  Pain control  DVT prophylaxis  OOB/Physical Therapy   MEDICINE/NEUROLOGY/CARDIOLOGY WORK UP FOR ORTHOSTATIC HYPOTENSION

## 2019-01-18 NOTE — PROGRESS NOTE ADULT - SUBJECTIVE AND OBJECTIVE BOX
POST OPERATIVE DAY #: 1  STATUS POST: left reverse TSR                   SUBJECTIVE: Patient seen and examined. Pt. is annoyed he is having workup done. Pt. states he had syncope worked up last year (with no final diagnosis). Pt. would like to go home. Denies any shoulder pain, sob/cp/n/v/ numbness or tingling in b/l ues.       OBJECTIVE:     Vital Signs Last 24 Hrs  T(C): 36.7 (18 Jan 2019 09:58), Max: 37.8 (17 Jan 2019 21:58)  T(F): 98.1 (18 Jan 2019 09:58), Max: 100 (17 Jan 2019 21:58)  HR: 86 (18 Jan 2019 09:39) (82 - 106)  BP: 129/72 (18 Jan 2019 09:39) (123/74 - 146/73)  BP(mean): 106 (18 Jan 2019 04:45) (94 - 106)  RR: 18 (18 Jan 2019 09:39) (16 - 18)  SpO2: 96% (18 Jan 2019 09:39) (92% - 98%)    Affected extremity: left UE          Dressing: clean/dry/intact with sling in place          Sensation: intact to light touch to patient's baseline         Motor exam: /wrist flex/ext 5/5   Pulses 2+             I&O's Detail    17 Jan 2019 07:01  -  18 Jan 2019 07:00  --------------------------------------------------------  IN:    lactated ringers.: 1200 mL  Total IN: 1200 mL    OUT:    Voided: 1550 mL  Total OUT: 1550 mL    Total NET: -350 mL      18 Jan 2019 07:01  -  18 Jan 2019 12:18  --------------------------------------------------------  IN:    Oral Fluid: 120 mL  Total IN: 120 mL    OUT:    Voided: 200 mL  Total OUT: 200 mL    Total NET: -80 mL          LABS:                        12.5   10.1  )-----------( 241      ( 17 Jan 2019 07:00 )             36.9     01-17    136  |  96  |  9   ----------------------------<  143<H>  4.0   |  20<L>  |  0.86    Ca    9.0      17 Jan 2019 07:00  Phos  4.6     01-17  Mg     1.9     01-17    TPro  6.3  /  Alb  3.4  /  TBili  0.7  /  DBili  x   /  AST  33  /  ALT  31  /  AlkPhos  80  01-17    PT/INR - ( 17 Jan 2019 07:00 )   PT: 12.3 sec;   INR: 1.09          PTT - ( 17 Jan 2019 07:00 )  PTT:25.3 sec      MEDICATIONS:    acetaminophen   Tablet .. 650 milliGRAM(s) Oral every 6 hours PRN  zaleplon 5 milliGRAM(s) Oral at bedtime PRN    aspirin enteric coated 81 milliGRAM(s) Oral daily        ASSESSMENT AND PLAN: 79yo Male s/p left reverse TSR     1. Analgesic pain control  2. DVT prophylaxis: SCDs     3. Weight Bearing Status:  Weight bearing as tolerated            4. Disposition: Home PENDING MED CLEARANCE   5. Orthostatic hypotension- Neurology signed off, if EEG negative for 24 can r/o seizure as cause, continue to hydrate for orthostatic syncope. Pt. refused labs for w/u from neurology.   6. Pending US carotid doppler If everything negative and cleared by PT can dc home. POST OPERATIVE DAY #: 1  STATUS POST: left reverse TSR                   SUBJECTIVE: Patient seen and examined. Pt. is annoyed he is having workup done. Pt. states he had syncope worked up last year (with no final diagnosis). Pt. would like to go home. Denies any shoulder pain, sob/cp/n/v/ numbness or tingling in b/l ues.       OBJECTIVE:     Vital Signs Last 24 Hrs  T(C): 36.7 (18 Jan 2019 09:58), Max: 37.8 (17 Jan 2019 21:58)  T(F): 98.1 (18 Jan 2019 09:58), Max: 100 (17 Jan 2019 21:58)  HR: 86 (18 Jan 2019 09:39) (82 - 106)  BP: 129/72 (18 Jan 2019 09:39) (123/74 - 146/73)  BP(mean): 106 (18 Jan 2019 04:45) (94 - 106)  RR: 18 (18 Jan 2019 09:39) (16 - 18)  SpO2: 96% (18 Jan 2019 09:39) (92% - 98%)    Affected extremity: left UE          Dressing: clean/dry/intact with sling in place          Sensation: intact to light touch to patient's baseline         Motor exam: /wrist flex/ext 5/5   Pulses 2+             I&O's Detail    17 Jan 2019 07:01  -  18 Jan 2019 07:00  --------------------------------------------------------  IN:    lactated ringers.: 1200 mL  Total IN: 1200 mL    OUT:    Voided: 1550 mL  Total OUT: 1550 mL    Total NET: -350 mL      18 Jan 2019 07:01  -  18 Jan 2019 12:18  --------------------------------------------------------  IN:    Oral Fluid: 120 mL  Total IN: 120 mL    OUT:    Voided: 200 mL  Total OUT: 200 mL    Total NET: -80 mL          LABS:                        12.5   10.1  )-----------( 241      ( 17 Jan 2019 07:00 )             36.9     01-17    136  |  96  |  9   ----------------------------<  143<H>  4.0   |  20<L>  |  0.86    Ca    9.0      17 Jan 2019 07:00  Phos  4.6     01-17  Mg     1.9     01-17    TPro  6.3  /  Alb  3.4  /  TBili  0.7  /  DBili  x   /  AST  33  /  ALT  31  /  AlkPhos  80  01-17    PT/INR - ( 17 Jan 2019 07:00 )   PT: 12.3 sec;   INR: 1.09          PTT - ( 17 Jan 2019 07:00 )  PTT:25.3 sec      MEDICATIONS:    acetaminophen   Tablet .. 650 milliGRAM(s) Oral every 6 hours PRN  zaleplon 5 milliGRAM(s) Oral at bedtime PRN    aspirin enteric coated 81 milliGRAM(s) Oral daily        ASSESSMENT AND PLAN: 77yo Male s/p left reverse TSR     1. Analgesic pain control  2. DVT prophylaxis: SCDs     3. Weight Bearing Status:  Weight bearing as tolerated            4. Disposition: Home PENDING MED CLEARANCE   5. Orthostatic hypotension- Neurology signed off, if EEG negative for 24 can r/o seizure as cause, continue to hydrate for orthostatic syncope. Pt. refused labs for w/u from neurology.   6. Pending US carotid doppler f/u  7. Left pleural effusion, Dr. Sykes to do thoracentesis before discharge

## 2019-01-18 NOTE — DISCHARGE NOTE ADULT - PATIENT PORTAL LINK FT
You can access the [facility] Patient Portal, offered by [facility], by registering with the following website: [email or web address]

## 2019-01-18 NOTE — DISCHARGE NOTE ADULT - CARE PLAN
Principal Discharge DX:	Closed displaced fracture of surgical neck of right humerus, unspecified fracture morphology, initial encounter  Goal:	Improvement in pain and ambulation after surgery  Assessment and plan of treatment:	No strenuous activity, heavy lifting, driving or returning to work until cleared by MD.  You may shower - dressing is water-resistant, no soaking in bathtubs.  Remove dressing after post op day 5-7, then leave incision open to air. Keep incision clean and dry.  Try to have regular bowel movements, take stool softener or laxative if necessary.  May take Pepcid or Zantac for upset stomach  May take Aleve or Naproxen. Swelling may travel all the way down to hand, this is normal and will subside in a few weeks.  Call to schedule an appt with Dr. Wei for follow up, if you have staples or sutures they will be removed in office.  Contact your doctor if you experience: fever greater than 101.5, chills, chest pain, difficulty breathing, redness or excessive drainage around the incision, other concerns.    **Pt. would like to leave today and not proceed with thoracentesis (or any other tests) as per Dr. Sykes recommended for increased pleural effusion on left side. Pt. has been advised to stay in house for procedure but would like to follow up with specialist after discharge (pt. is aware of effusion from 12/18 and was told to follow up in 6m but will do so after discharge). *******

## 2019-01-18 NOTE — PROGRESS NOTE ADULT - REASON FOR ADMISSION
left shoulder pain

## 2019-01-18 NOTE — PROGRESS NOTE ADULT - SUBJECTIVE AND OBJECTIVE BOX
POST OPERATIVE DAY # 2 left reverse TSA   SUBJECTIVE: Seen and examined. Shoulder doing well. Pending medical work up  Denies chest pain/SOB/dizziness/n/v/HA   Pain well controlled.       OBJECTIVE:     Vital Signs Last 24 Hrs  T(C): 37 (18 Jan 2019 05:29), Max: 37.8 (17 Jan 2019 21:58)  T(F): 98.6 (18 Jan 2019 05:29), Max: 100 (17 Jan 2019 21:58)  HR: 82 (18 Jan 2019 04:45) (80 - 106)  BP: 144/84 (18 Jan 2019 04:45) (108/62 - 146/73)  BP(mean): 106 (18 Jan 2019 04:45) (82 - 106)  RR: 18 (18 Jan 2019 04:45) (16 - 18)  SpO2: 94% (18 Jan 2019 04:45) (92% - 98%)    PE:   Dressing: clean/dry/intact- aquacel, Sling in place.  EEG leads being placed at time of interview.         Sensation: intact to light touch but less sensation to left fingers compared to right.          Motor exam:  firing AIN/PIN/U nerves BUE. Wrist flex/ext intact.          Skin warm, well-perfused b/l hands and fingers; capillary refill brisk; radial pulses 2+ BUE.            LABS:                        12.5   10.1  )-----------( 241      ( 17 Jan 2019 07:00 )             36.9     01-17    136  |  96  |  9   ----------------------------<  143<H>  4.0   |  20<L>  |  0.86    Ca    9.0      17 Jan 2019 07:00  Phos  4.6     01-17  Mg     1.9     01-17    TPro  6.3  /  Alb  3.4  /  TBili  0.7  /  DBili  x   /  AST  33  /  ALT  31  /  AlkPhos  80  01-17  PT/INR - ( 17 Jan 2019 07:00 )   PT: 12.3 sec;   INR: 1.09     PTT - ( 17 Jan 2019 07:00 )  PTT:25.3 sec      ASSESSMENT AND PLAN: 78M POD2 left reverse TSA with rapid response x 2 this AM  1. Currently on EEG monitor, will follow up  2. Echo and carotid doppler  pending  3. Will encourage PO intact, aggressive IV hydration/bolus  4.  Analgesic pain control  5. Appreciate Med, Cardio, Neuro recs   6. DVT prophylaxis: ASA 81   7. Weight Bearing Status:  nwb  8. Disposition: home when cleared POST OPERATIVE DAY # 2 left reverse TSA   SUBJECTIVE: Seen and examined. Shoulder doing well. Pending medical work up  Denies chest pain/SOB/dizziness/n/v/HA   Pain well controlled.       OBJECTIVE:     Vital Signs Last 24 Hrs  T(C): 37 (18 Jan 2019 05:29), Max: 37.8 (17 Jan 2019 21:58)  T(F): 98.6 (18 Jan 2019 05:29), Max: 100 (17 Jan 2019 21:58)  HR: 82 (18 Jan 2019 04:45) (80 - 106)  BP: 144/84 (18 Jan 2019 04:45) (108/62 - 146/73)  BP(mean): 106 (18 Jan 2019 04:45) (82 - 106)  RR: 18 (18 Jan 2019 04:45) (16 - 18)  SpO2: 94% (18 Jan 2019 04:45) (92% - 98%)    PE:   Dressing: clean/dry/intact- aquacel, Sling in place.  EEG leads being placed at time of interview.         Sensation: intact to light touch but less sensation to left fingers compared to right.          Motor exam:  firing AIN/PIN/U nerves BUE. Wrist flex/ext intact.          Skin warm, well-perfused b/l hands and fingers; capillary refill brisk; radial pulses 2+ BUE.            LABS:                        12.5   10.1  )-----------( 241      ( 17 Jan 2019 07:00 )             36.9     01-17    136  |  96  |  9   ----------------------------<  143<H>  4.0   |  20<L>  |  0.86    Ca    9.0      17 Jan 2019 07:00  Phos  4.6     01-17  Mg     1.9     01-17    TPro  6.3  /  Alb  3.4  /  TBili  0.7  /  DBili  x   /  AST  33  /  ALT  31  /  AlkPhos  80  01-17  PT/INR - ( 17 Jan 2019 07:00 )   PT: 12.3 sec;   INR: 1.09     PTT - ( 17 Jan 2019 07:00 )  PTT:25.3 sec      ASSESSMENT AND PLAN: 78M POD2 left reverse TSA with rapid response x 2 this AM  1. Currently on EEG monitor, will follow up  2. Echo and carotid doppler  pending  3. Will encourage PO intact, aggressive IV hydration/bolus  4.  Analgesic pain control  5. Appreciate Med, Cardio, Neuro recs   6. DVT prophylaxis: ASA 81   7. Weight Bearing Status:  dary JASON  8. Disposition: home when cleared

## 2019-01-22 ENCOUNTER — MED ADMIN CHARGE (OUTPATIENT)
Age: 79
End: 2019-01-22

## 2019-01-22 DIAGNOSIS — Z79.82 LONG TERM (CURRENT) USE OF ASPIRIN: ICD-10-CM
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
## 2019-01-22 DIAGNOSIS — Z91.81 HISTORY OF FALLING: ICD-10-CM

## 2019-01-22 DIAGNOSIS — R55 SYNCOPE AND COLLAPSE: ICD-10-CM

## 2019-01-22 DIAGNOSIS — Z72.89 OTHER PROBLEMS RELATED TO LIFESTYLE: ICD-10-CM

## 2019-01-22 DIAGNOSIS — I95.1 ORTHOSTATIC HYPOTENSION: ICD-10-CM

## 2019-01-22 DIAGNOSIS — J98.11 ATELECTASIS: ICD-10-CM

## 2019-01-22 DIAGNOSIS — E86.0 DEHYDRATION: ICD-10-CM

## 2019-01-22 DIAGNOSIS — Z28.21 IMMUNIZATION NOT CARRIED OUT BECAUSE OF PATIENT REFUSAL: ICD-10-CM

## 2019-01-22 DIAGNOSIS — J90 PLEURAL EFFUSION, NOT ELSEWHERE CLASSIFIED: ICD-10-CM

## 2019-01-22 DIAGNOSIS — S42.212A UNSPECIFIED DISPLACED FRACTURE OF SURGICAL NECK OF LEFT HUMERUS, INITIAL ENCOUNTER FOR CLOSED FRACTURE: ICD-10-CM

## 2019-01-22 DIAGNOSIS — S42.202A UNSPECIFIED FRACTURE OF UPPER END OF LEFT HUMERUS, INITIAL ENCOUNTER FOR CLOSED FRACTURE: ICD-10-CM

## 2019-01-22 DIAGNOSIS — Z53.29 PROCEDURE AND TREATMENT NOT CARRIED OUT BECAUSE OF PATIENT'S DECISION FOR OTHER REASONS: ICD-10-CM

## 2019-01-22 DIAGNOSIS — D64.9 ANEMIA, UNSPECIFIED: ICD-10-CM
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
## 2019-01-22 LAB — SURGICAL PATHOLOGY STUDY: SIGNIFICANT CHANGE UP

## 2019-01-24 ENCOUNTER — OUTPATIENT (OUTPATIENT)
Dept: OUTPATIENT SERVICES | Facility: HOSPITAL | Age: 79
LOS: 1 days | End: 2019-01-24
Payer: MEDICARE

## 2019-01-24 ENCOUNTER — APPOINTMENT (OUTPATIENT)
Dept: ORTHOPEDIC SURGERY | Facility: CLINIC | Age: 79
End: 2019-01-24
Payer: MEDICARE

## 2019-01-24 PROCEDURE — 73030 X-RAY EXAM OF SHOULDER: CPT | Mod: 26,LT

## 2019-01-24 PROCEDURE — 73030 X-RAY EXAM OF SHOULDER: CPT

## 2019-01-24 PROCEDURE — 99024 POSTOP FOLLOW-UP VISIT: CPT

## 2019-01-24 NOTE — HISTORY OF PRESENT ILLNESS
[___ Weeks Post Op] : [unfilled] weeks post op [2] : the patient reports pain that is 2/10 in severity [Chills] : no chills [Fever] : no fever [Nausea] : no nausea [Vomiting] : no vomiting [Intact] : was intact [None] : had no drainage [Xray (Date:___)] : [unfilled] Xray -  [Hardware in Good Position] : hardware in good position [Good Overall Alignment] : good overall alignment [Fixation Site Stable] : fixation site appears stable [Doing Well] : is doing well [Excellent Pain Control] : has excellent pain control [Staples Removed] : staples were removed [Steri-Strips Removed & Replaced] : steri-strips removed and replaced [de-identified] : First post op of left shoulder [FreeTextEntry1] : hematoma at distal aspect [de-identified] : 78M s/p L rTSA 1 week post op\par -begin PT; pendulum exercises, no ER/IR, reaching into pocket or getting out of chair\par -ROM elbow and hand/wrist\par -f/u in 2 weeks [de-identified] : no external or internal rotation, getting out of chair, reaching into pocket with left arm

## 2019-01-25 ENCOUNTER — OTHER (OUTPATIENT)
Age: 79
End: 2019-01-25

## 2019-01-25 PROCEDURE — 93306 TTE W/DOPPLER COMPLETE: CPT

## 2019-01-25 PROCEDURE — 83036 HEMOGLOBIN GLYCOSYLATED A1C: CPT

## 2019-01-25 PROCEDURE — 84484 ASSAY OF TROPONIN QUANT: CPT

## 2019-01-25 PROCEDURE — 36415 COLL VENOUS BLD VENIPUNCTURE: CPT

## 2019-01-25 PROCEDURE — 95951: CPT

## 2019-01-25 PROCEDURE — 85610 PROTHROMBIN TIME: CPT

## 2019-01-25 PROCEDURE — 86140 C-REACTIVE PROTEIN: CPT

## 2019-01-25 PROCEDURE — 73020 X-RAY EXAM OF SHOULDER: CPT

## 2019-01-25 PROCEDURE — 93880 EXTRACRANIAL BILAT STUDY: CPT

## 2019-01-25 PROCEDURE — 82553 CREATINE MB FRACTION: CPT

## 2019-01-25 PROCEDURE — 80053 COMPREHEN METABOLIC PANEL: CPT

## 2019-01-25 PROCEDURE — 97116 GAIT TRAINING THERAPY: CPT

## 2019-01-25 PROCEDURE — 83735 ASSAY OF MAGNESIUM: CPT

## 2019-01-25 PROCEDURE — 82962 GLUCOSE BLOOD TEST: CPT

## 2019-01-25 PROCEDURE — C1889: CPT

## 2019-01-25 PROCEDURE — 84100 ASSAY OF PHOSPHORUS: CPT

## 2019-01-25 PROCEDURE — 88311 DECALCIFY TISSUE: CPT

## 2019-01-25 PROCEDURE — 85730 THROMBOPLASTIN TIME PARTIAL: CPT

## 2019-01-25 PROCEDURE — C1776: CPT

## 2019-01-25 PROCEDURE — 86901 BLOOD TYPING SEROLOGIC RH(D): CPT

## 2019-01-25 PROCEDURE — 86900 BLOOD TYPING SEROLOGIC ABO: CPT

## 2019-01-25 PROCEDURE — C1713: CPT

## 2019-01-25 PROCEDURE — 87641 MR-STAPH DNA AMP PROBE: CPT

## 2019-01-25 PROCEDURE — 71045 X-RAY EXAM CHEST 1 VIEW: CPT

## 2019-01-25 PROCEDURE — 97535 SELF CARE MNGMENT TRAINING: CPT

## 2019-01-25 PROCEDURE — 88305 TISSUE EXAM BY PATHOLOGIST: CPT

## 2019-01-25 PROCEDURE — 83605 ASSAY OF LACTIC ACID: CPT

## 2019-01-25 PROCEDURE — 85025 COMPLETE CBC W/AUTO DIFF WBC: CPT

## 2019-01-25 PROCEDURE — 82550 ASSAY OF CK (CPK): CPT

## 2019-01-25 PROCEDURE — 86850 RBC ANTIBODY SCREEN: CPT

## 2019-01-28 ENCOUNTER — TRANSCRIPTION ENCOUNTER (OUTPATIENT)
Age: 79
End: 2019-01-28

## 2019-02-07 ENCOUNTER — APPOINTMENT (OUTPATIENT)
Dept: ORTHOPEDIC SURGERY | Facility: CLINIC | Age: 79
End: 2019-02-07
Payer: MEDICARE

## 2019-02-07 PROCEDURE — 99024 POSTOP FOLLOW-UP VISIT: CPT

## 2019-02-07 NOTE — HISTORY OF PRESENT ILLNESS
[___ Weeks Post Op] : [unfilled] weeks post op [0] : no pain reported [Chills] : no chills [Fever] : no fever [Nausea] : no nausea [Vomiting] : no vomiting [Intact] : was intact [None] : had no drainage [Normal Skin] : normal appearance [Doing Well] : is doing well [Excellent Pain Control] : has excellent pain control [de-identified] : Second post op of left shoulder [de-identified] : Forward flexion to 90\par Abduction to 70 [de-identified] : 78M sp rTSA 3 weeks postop\par -continue pendulum exercises\par -limit ER/IR, reaching back pocket, getting out of chair with arm\par -follow up in 3 weeks

## 2019-02-15 ENCOUNTER — APPOINTMENT (OUTPATIENT)
Dept: PULMONOLOGY | Facility: CLINIC | Age: 79
End: 2019-02-15
Payer: MEDICARE

## 2019-02-15 ENCOUNTER — APPOINTMENT (OUTPATIENT)
Dept: PULMONOLOGY | Facility: CLINIC | Age: 79
End: 2019-02-15

## 2019-02-15 ENCOUNTER — INPATIENT (INPATIENT)
Facility: HOSPITAL | Age: 79
LOS: 4 days | Discharge: HOME | DRG: 187 | End: 2019-02-20
Attending: INTERNAL MEDICINE | Admitting: INTERNAL MEDICINE
Payer: MEDICARE

## 2019-02-15 ENCOUNTER — RESULT REVIEW (OUTPATIENT)
Age: 79
End: 2019-02-15

## 2019-02-15 VITALS
HEIGHT: 72 IN | DIASTOLIC BLOOD PRESSURE: 103 MMHG | HEART RATE: 81 BPM | OXYGEN SATURATION: 98 % | SYSTOLIC BLOOD PRESSURE: 151 MMHG | TEMPERATURE: 98 F | RESPIRATION RATE: 16 BRPM | WEIGHT: 179.9 LBS

## 2019-02-15 VITALS
SYSTOLIC BLOOD PRESSURE: 110 MMHG | HEIGHT: 72 IN | RESPIRATION RATE: 12 BRPM | HEART RATE: 81 BPM | TEMPERATURE: 97.6 F | BODY MASS INDEX: 24.38 KG/M2 | DIASTOLIC BLOOD PRESSURE: 80 MMHG | WEIGHT: 180 LBS | OXYGEN SATURATION: 98 %

## 2019-02-15 VITALS
TEMPERATURE: 97.9 F | DIASTOLIC BLOOD PRESSURE: 97 MMHG | SYSTOLIC BLOOD PRESSURE: 168 MMHG | HEIGHT: 72 IN | HEART RATE: 72 BPM | WEIGHT: 180 LBS | BODY MASS INDEX: 24.38 KG/M2 | OXYGEN SATURATION: 93 %

## 2019-02-15 DIAGNOSIS — R06.02 SHORTNESS OF BREATH: ICD-10-CM

## 2019-02-15 DIAGNOSIS — J90 PLEURAL EFFUSION, NOT ELSEWHERE CLASSIFIED: ICD-10-CM

## 2019-02-15 DIAGNOSIS — N40.0 BENIGN PROSTATIC HYPERPLASIA WITHOUT LOWER URINARY TRACT SYMPTOMS: ICD-10-CM

## 2019-02-15 DIAGNOSIS — I31.3 PERICARDIAL EFFUSION (NONINFLAMMATORY): ICD-10-CM

## 2019-02-15 DIAGNOSIS — R63.8 OTHER SYMPTOMS AND SIGNS CONCERNING FOOD AND FLUID INTAKE: ICD-10-CM

## 2019-02-15 DIAGNOSIS — S42.309A UNSPECIFIED FRACTURE OF SHAFT OF HUMERUS, UNSPECIFIED ARM, INITIAL ENCOUNTER FOR CLOSED FRACTURE: Chronic | ICD-10-CM

## 2019-02-15 DIAGNOSIS — Z91.89 OTHER SPECIFIED PERSONAL RISK FACTORS, NOT ELSEWHERE CLASSIFIED: ICD-10-CM

## 2019-02-15 DIAGNOSIS — Z29.9 ENCOUNTER FOR PROPHYLACTIC MEASURES, UNSPECIFIED: ICD-10-CM

## 2019-02-15 DIAGNOSIS — E87.1 HYPO-OSMOLALITY AND HYPONATREMIA: ICD-10-CM

## 2019-02-15 LAB
ALBUMIN FLD-MCNC: 1.9 G/DL — SIGNIFICANT CHANGE UP
ALBUMIN SERPL ELPH-MCNC: 4.2 G/DL — SIGNIFICANT CHANGE UP (ref 3.3–5)
ALP SERPL-CCNC: 133 U/L — HIGH (ref 40–120)
ALT FLD-CCNC: 28 U/L — SIGNIFICANT CHANGE UP (ref 10–45)
ANION GAP SERPL CALC-SCNC: 12 MMOL/L — SIGNIFICANT CHANGE UP (ref 5–17)
APTT BLD: 31.9 SEC — SIGNIFICANT CHANGE UP (ref 27.5–36.3)
AST SERPL-CCNC: 27 U/L — SIGNIFICANT CHANGE UP (ref 10–40)
BASOPHILS # BLD AUTO: 0.08 K/UL — SIGNIFICANT CHANGE UP (ref 0–0.2)
BASOPHILS NFR BLD AUTO: 1.2 % — SIGNIFICANT CHANGE UP (ref 0–2)
BILIRUB SERPL-MCNC: 0.4 MG/DL — SIGNIFICANT CHANGE UP (ref 0.2–1.2)
BUN SERPL-MCNC: 9 MG/DL — SIGNIFICANT CHANGE UP (ref 7–23)
CALCIUM SERPL-MCNC: 9.7 MG/DL — SIGNIFICANT CHANGE UP (ref 8.4–10.5)
CHLORIDE SERPL-SCNC: 92 MMOL/L — LOW (ref 96–108)
CO2 SERPL-SCNC: 24 MMOL/L — SIGNIFICANT CHANGE UP (ref 22–31)
CREAT SERPL-MCNC: 0.86 MG/DL — SIGNIFICANT CHANGE UP (ref 0.5–1.3)
EOSINOPHIL # BLD AUTO: 0.14 K/UL — SIGNIFICANT CHANGE UP (ref 0–0.5)
EOSINOPHIL NFR BLD AUTO: 2.1 % — SIGNIFICANT CHANGE UP (ref 0–6)
EXTRA SST TUBE: SIGNIFICANT CHANGE UP
GLUCOSE FLD-MCNC: 78 MG/DL — SIGNIFICANT CHANGE UP
GLUCOSE SERPL-MCNC: 106 MG/DL — HIGH (ref 70–99)
HCT VFR BLD CALC: 41.8 % — SIGNIFICANT CHANGE UP (ref 39–50)
HGB BLD-MCNC: 13.9 G/DL — SIGNIFICANT CHANGE UP (ref 13–17)
IMM GRANULOCYTES NFR BLD AUTO: 0.3 % — SIGNIFICANT CHANGE UP (ref 0–1.5)
INR BLD: 1.06 — SIGNIFICANT CHANGE UP (ref 0.88–1.16)
LDH SERPL L TO P-CCNC: 107 U/L — SIGNIFICANT CHANGE UP
LYMPHOCYTES # BLD AUTO: 1.67 K/UL — SIGNIFICANT CHANGE UP (ref 1–3.3)
LYMPHOCYTES # BLD AUTO: 25.2 % — SIGNIFICANT CHANGE UP (ref 13–44)
MCHC RBC-ENTMCNC: 31.6 PG — SIGNIFICANT CHANGE UP (ref 27–34)
MCHC RBC-ENTMCNC: 33.3 GM/DL — SIGNIFICANT CHANGE UP (ref 32–36)
MCV RBC AUTO: 95 FL — SIGNIFICANT CHANGE UP (ref 80–100)
MONOCYTES # BLD AUTO: 0.8 K/UL — SIGNIFICANT CHANGE UP (ref 0–0.9)
MONOCYTES NFR BLD AUTO: 12.1 % — SIGNIFICANT CHANGE UP (ref 2–14)
NEUTROPHILS # BLD AUTO: 3.91 K/UL — SIGNIFICANT CHANGE UP (ref 1.8–7.4)
NEUTROPHILS NFR BLD AUTO: 59.1 % — SIGNIFICANT CHANGE UP (ref 43–77)
NRBC # BLD: 0 /100 WBCS — SIGNIFICANT CHANGE UP (ref 0–0)
PH FLD: 7.51 — SIGNIFICANT CHANGE UP
PLATELET # BLD AUTO: 244 K/UL — SIGNIFICANT CHANGE UP (ref 150–400)
POTASSIUM SERPL-MCNC: 4.7 MMOL/L — SIGNIFICANT CHANGE UP (ref 3.5–5.3)
POTASSIUM SERPL-SCNC: 4.7 MMOL/L — SIGNIFICANT CHANGE UP (ref 3.5–5.3)
PROT FLD-MCNC: 2.8 G/DL — SIGNIFICANT CHANGE UP
PROT SERPL-MCNC: 7.9 G/DL — SIGNIFICANT CHANGE UP (ref 6–8.3)
PROTHROM AB SERPL-ACNC: 12 SEC — SIGNIFICANT CHANGE UP (ref 10–12.9)
RBC # BLD: 4.4 M/UL — SIGNIFICANT CHANGE UP (ref 4.2–5.8)
RBC # FLD: 14 % — SIGNIFICANT CHANGE UP (ref 10.3–14.5)
SODIUM SERPL-SCNC: 128 MMOL/L — LOW (ref 135–145)
SPECIMEN SOURCE FLD: SIGNIFICANT CHANGE UP
WBC # BLD: 6.62 K/UL — SIGNIFICANT CHANGE UP (ref 3.8–10.5)
WBC # FLD AUTO: 6.62 K/UL — SIGNIFICANT CHANGE UP (ref 3.8–10.5)

## 2019-02-15 PROCEDURE — 99223 1ST HOSP IP/OBS HIGH 75: CPT

## 2019-02-15 PROCEDURE — 99214 OFFICE O/P EST MOD 30 MIN: CPT

## 2019-02-15 PROCEDURE — 99285 EMERGENCY DEPT VISIT HI MDM: CPT | Mod: 25

## 2019-02-15 PROCEDURE — 32551 INSERTION OF CHEST TUBE: CPT

## 2019-02-15 PROCEDURE — 71045 X-RAY EXAM CHEST 1 VIEW: CPT | Mod: 26

## 2019-02-15 PROCEDURE — 99285 EMERGENCY DEPT VISIT HI MDM: CPT

## 2019-02-15 PROCEDURE — 99223 1ST HOSP IP/OBS HIGH 75: CPT | Mod: GC

## 2019-02-15 PROCEDURE — 99204 OFFICE O/P NEW MOD 45 MIN: CPT

## 2019-02-15 RX ORDER — INFLUENZA VIRUS VACCINE 15; 15; 15; 15 UG/.5ML; UG/.5ML; UG/.5ML; UG/.5ML
0.5 SUSPENSION INTRAMUSCULAR ONCE
Qty: 0 | Refills: 0 | Status: COMPLETED | OUTPATIENT
Start: 2019-02-15 | End: 2019-02-15

## 2019-02-15 RX ORDER — TAMSULOSIN HYDROCHLORIDE 0.4 MG/1
0.4 CAPSULE ORAL AT BEDTIME
Qty: 0 | Refills: 0 | Status: DISCONTINUED | OUTPATIENT
Start: 2019-02-15 | End: 2019-02-20

## 2019-02-15 RX ORDER — ASPIRIN/CALCIUM CARB/MAGNESIUM 324 MG
81 TABLET ORAL DAILY
Qty: 0 | Refills: 0 | Status: DISCONTINUED | OUTPATIENT
Start: 2019-02-15 | End: 2019-02-20

## 2019-02-15 RX ORDER — HEPARIN SODIUM 5000 [USP'U]/ML
5000 INJECTION INTRAVENOUS; SUBCUTANEOUS EVERY 8 HOURS
Qty: 0 | Refills: 0 | Status: DISCONTINUED | OUTPATIENT
Start: 2019-02-15 | End: 2019-02-20

## 2019-02-15 NOTE — H&P ADULT - PROBLEM SELECTOR PLAN 6
-PCP Contacted on Admission: (Y/N) --> Name & Phone #: Dr. Tomy Gibbs [phone]  -Date of Contact with PCP:  -PCP Contacted at Discharge: (Y/N, N/A)  -Summary of Handoff Given to PCP:   -Post-Discharge Appointment Date and Location:

## 2019-02-15 NOTE — ASSESSMENT
[FreeTextEntry1] : Pt with large left pleural effusion, appears chronic since at least December. Could be a hemothorax given fall and chest wall hematoma and humeral fracture on same side. Needs dx and therapeutic thora today. Will send to [facility] for a tap. If indeed hemothorax, will need admission for evaluation, likely via chest tube drainage. If not, then will await plural fluid analysis before proceeding further.

## 2019-02-15 NOTE — H&P ADULT - NSHPPHYSICALEXAM_GEN_ALL_CORE
Constitutional: WDWN resting comfortably in bed; NAD  Head: NC/AT  Eyes: PERRL, EOMI, anicteric sclera  ENT: no nasal discharge; uvula midline, no oropharyngeal erythema or exudates; MMM  Neck: supple; no JVD or thyromegaly  Respiratory: Decreased breath sounds of left lung from base to mid back, right side clear, no wheezes, rales, rhonchi  Cardiac: +S1/S2; RRR; no M/R/G; PMI non-displaced  Gastrointestinal: soft, NT/ND; no rebound or guarding; +BSx4  Extremities: WWP, 2+ pitting edema up to mid shin bilaterally  Musculoskeletal: NROM x4; no joint swelling, tenderness or erythema  Vascular: 2+ radial, femoral, DP/PT pulses B/L  Neurologic: AAOx3; CNII-XII grossly intact; no focal deficits

## 2019-02-15 NOTE — CONSULT NOTE ADULT - PROBLEM SELECTOR RECOMMENDATION 3
- small pericardial effusion noted on previous echo, may be related to pleural effusion  - would repeat echo to evaluate for resolution.

## 2019-02-15 NOTE — ED ADULT TRIAGE NOTE - ARRIVAL INFO ADDITIONAL COMMENTS
as per md alcala, pt has "possible hemothorax". pt c.o sob for one week. denies any cough, fever/chills, injuries, chest pain. pt is speaking in clear, complete sentences. equal and b.l chest rises with unlabored breathing noted as per md alcala, pt has left sided "possible hemothorax". pt c.o sob for one week. denies any cough, fever/chills, injuries, chest pain. pt is speaking in clear, complete sentences. equal and b.l chest rises with unlabored breathing noted

## 2019-02-15 NOTE — H&P ADULT - ATTENDING COMMENTS
Pt seen and examined at bedside on 2/15/2019 @ 2000    Agree with HPIALVERTO as above. Feels that his breathing has improved s/p thora. Currently no complaints.    VS, Labs, FH, SH, allergies, medications, imaging reviewed. I personally reviewed the patient's EKG- NSR. I reviewed the patient's charted records - has had previous admission for humeral fracture. Agree with physical exam as above    A/P: 78 year old male with PMH BPH, left proximal humerus fracture in December repaired in January and pleural effusion who presents from Dr. Pedraza's office with worsening shortness of breath and enlargement of pleural effusion.    **Pleural effusion  -Unclear etiology, has been an ongoing issue  -S/p thora with pulm  -f/u pleural studies  -f/u further pulm recs    Plan otherwise as outlined above.....

## 2019-02-15 NOTE — PHYSICAL EXAM
[General Appearance - Well Developed] : well developed [Normal Appearance] : normal appearance [General Appearance - Well Nourished] : well nourished [Normal Conjunctiva] : the conjunctiva exhibited no abnormalities [Normal Oropharynx] : normal oropharynx [Neck Appearance] : the appearance of the neck was normal [Heart Rate And Rhythm] : heart rate and rhythm were normal [Heart Sounds] : normal S1 and S2 [Murmurs] : no murmurs present [Arterial Pulses Normal] : the arterial pulses were normal [Respiration, Rhythm And Depth] : normal respiratory rhythm and effort [Exaggerated Use Of Accessory Muscles For Inspiration] : no accessory muscle use [FreeTextEntry1] : diminished BSs and dullness half way up on the left [Bowel Sounds] : normal bowel sounds [Abdomen Soft] : soft [Abdomen Tenderness] : non-tender [] : no hepato-splenomegaly [Abnormal Walk] : normal gait [Nail Clubbing] : no clubbing of the fingernails [Cyanosis, Localized] : no localized cyanosis [Skin Color & Pigmentation] : normal skin color and pigmentation [Skin Turgor] : normal skin turgor [Cranial Nerves] : cranial nerves 2-12 were intact [Deep Tendon Reflexes (DTR)] : deep tendon reflexes were 2+ and symmetric [Oriented To Time, Place, And Person] : oriented to person, place, and time [Impaired Insight] : insight and judgment were intact

## 2019-02-15 NOTE — ED ADULT NURSE REASSESSMENT NOTE - NS ED NURSE REASSESS COMMENT FT1
pt aaox3 no deficits no sob no chest pain no n/v.  iv intact.  left sided chest tube intact to wall suction.  clear drainage in pleuravac.  pending transfer to floor bed.

## 2019-02-15 NOTE — ED PROVIDER NOTE - PHYSICAL EXAMINATION
CONSTITUTIONAL: Well-appearing; well-nourished; in no apparent distress.   HEAD: Normocephalic; atraumatic.   EYES:  conjunctiva and sclera clear  ENT: normal nose; no rhinorrhea; normal pharynx with no erythema or lesions.   NECK: Supple; non-tender;   CARDIOVASCULAR: Normal S1, S2; no murmurs, rubs, or gallops. Regular rate and rhythm.   RESPIRATORY: Breathing easily; breath sounds clear and equal bilaterally; no wheezes, rhonchi, or rales.  GI: Soft; non-distended; non-tender; no palpable organomegaly.   EXT: No cyanosis or edema; N/V intact  SKIN: Normal for age and race; warm; dry; good turgor; no apparent lesions or rash.   NEURO: A & O x 3; face symmetric; grossly unremarkable.   PSYCHOLOGICAL: The patient’s mood and manner are appropriate. CONSTITUTIONAL: Well-appearing; well-nourished; in no apparent distress.   HEAD: Normocephalic; atraumatic.   EYES:  conjunctiva and sclera clear  ENT: normal nose; no rhinorrhea; normal pharynx with no erythema or lesions.   NECK: Supple; non-tender;   CARDIOVASCULAR: Normal S1, S2; no murmurs, rubs, or gallops. Regular rate and rhythm.   RESPIRATORY: Breathing easily; breath sounds clear but decreased no wheezes, rhonchi, or rales.  GI: Soft; non-distended; non-tender; no palpable organomegaly.   EXT: No cyanosis or edema; N/V intact  SKIN: Normal for age and race; warm; dry; good turgor; no apparent lesions or rash.   NEURO: A & O x 3; face symmetric; grossly unremarkable.   PSYCHOLOGICAL: The patient’s mood and manner are appropriate.

## 2019-02-15 NOTE — H&P ADULT - PROBLEM SELECTOR PLAN 3
-Patient with history of BPH, takes flomax at home but unsure of dose, will start on 0.4mg for now and obtain collateral from pharmacy -Patient with history of BPH, takes flomax at home but unsure of dose, will start on 0.4mg for now and obtain collateral from pharmacy for proper dose.

## 2019-02-15 NOTE — H&P ADULT - PROBLEM SELECTOR PLAN 1
-Patient incidentally found to have L sided pleural effusion when admitted in December with L humerus fracture.  Was instructed to follow up with pulmonary as an outpatient but he did not.  When patient was admitted in January for repair of humerus fracture, plan was for thoracentesis but patient did not want to have it performed.  Now patient with 1.5 weeks dyspnea on exertion and in Dr. Pedraza's office was found to have enlargement of L sided pleural effusion on ultrasound.  CXR with large L sided pleural effusion.  -Pulmonary team consulted and chest tube placed.  Etiology unclear, less likely infectious as patient has no signs/symptoms of infection or CHF as patient with normal echocardiogram last month.  Fluid not bloody so not hemothorax.  Less likely malignant as no primary.  Drained 1.6 liters and chest tube clamped as patient could not tolerate more.  -Follow up pleural fluid studies: LDH, protein, glucose, cholesterol, cell count with diff, pH, culture, gram stain, ADA, AFB, fungal culture, albumin  -Will require repeat chest CT after more drainage  -Pulmonary team following, follow up recs -Patient incidentally found to have L sided pleural effusion when admitted in December with L humerus fracture.  Was instructed to follow up with pulmonary as an outpatient but he did not.  When patient was admitted in January for repair of humerus fracture, plan was for thoracentesis but patient did not want to have it performed.  Now patient with 1.5 weeks dyspnea on exertion and in Dr. Pedraza's office was found to have enlargement of L sided pleural effusion on ultrasound.  CXR with large L sided pleural effusion.  -Pulmonary team consulted and chest tube placed.  Etiology unclear, less likely infectious as patient has no signs/symptoms of infection or CHF as patient with normal echocardiogram last month.  Fluid not bloody so not hemothorax.  Unlikely malignant as no primary known.  Drained 1.6 liters and chest tube clamped, will drain more fluid tomorrow.  -Follow up pleural fluid studies: LDH, protein, glucose, cholesterol, cell count with diff, pH, culture, gram stain, ADA, AFB, fungal culture, albumin  -Will require repeat chest CT after more drainage  -Would repeat echocardiogram as patient previously will pericardial effusion on echocardiogram that may be related to pericardial effusion   -Pulmonary team following, follow up recs -Patient incidentally found to have L sided pleural effusion when admitted in December with L humerus fracture.  Was instructed to follow up with pulmonary as an outpatient but he did not.  When patient was admitted in January for repair of humerus fracture, plan was for thoracentesis but patient did not want to have it performed.  Now patient with 1.5 weeks dyspnea on exertion and in Dr. Pedraza's office was found to have enlargement of L sided pleural effusion on ultrasound.  CXR with large L sided pleural effusion.  -Pulmonary team consulted and chest tube placed.  Etiology unclear, less likely infectious as patient has no signs/symptoms of infection or CHF as patient with normal echocardiogram last month.  Fluid not bloody so not hemothorax.  Unlikely malignant as no primary CA known.  Drained 1.6 liters and chest tube clamped, will drain more fluid tomorrow.  -Follow up pleural fluid studies: LDH, protein, glucose, cholesterol, cell count with diff, pH, culture, gram stain, ADA, AFB, fungal culture, albumin  -Will require repeat chest CT after more drainage  -Would repeat echocardiogram as patient previously will pericardial effusion on echocardiogram that may be related to pericardial effusion   -Pulmonary team following, follow up recs

## 2019-02-15 NOTE — H&P ADULT - ASSESSMENT
78 year old male with PMH BPH, left proximal humerus fracture in December repaired in January and pleural effusion who presents from Dr. Pedraza's office with worsening shortness of breath and enlargement of pleural effusion.

## 2019-02-15 NOTE — ED ADULT NURSE REASSESSMENT NOTE - NS ED NURSE REASSESS COMMENT FT1
Patient in stretcher, chest tube in place by pulmonologist. 1200 mL output, xray at bedside. Patient in stretcher, chest tube in place by pulmonologist. 1600 mL output yellow effusion, xray at bedside. Patient in stretcher, chest tube in place by pulmonologist. 1600 mL output yellow effusion, xray at bedside. Denies CP, difficulty breathing, headache, pain, N/V.

## 2019-02-15 NOTE — REVIEW OF SYSTEMS
[Dyspnea] : dyspnea [Negative] : Psychiatric [Cough] : no cough [Sputum] : not coughing up ~M sputum

## 2019-02-15 NOTE — H&P ADULT - PROBLEM SELECTOR PLAN 2
-Patient with hyponatremia on labs with Na of 128, asymptomatic.  Will obtain serum osm, urine Na and urine osm to determine cause.  Not hyperglycemic, so unlikely hypertonic.  Unlikely isotonic as protein and lipid panel WNL.  Likely euvolemic (SIADH vs beer potomania) vs hypervolemic (CHF, patient with LE edema but normal echo last month) -Patient with hyponatremia on labs with Na of 128, asymptomatic.  Will obtain serum osm, urine Na and urine osm to determine cause.  Not hyperglycemic, so unlikely hypertonic.  Unlikely isotonic as protein and lipid panel WNL.  Hypovolemic vs euvolemic (SIADH vs beer potomania) vs hypervolemic (CHF, patient with LE edema but normal echo last month).

## 2019-02-15 NOTE — H&P ADULT - HISTORY OF PRESENT ILLNESS
78 year old male with PMH BPH, [facility] and [facility] effusion who presents from Dr. Pedraza's office with worsening shortness of breath and increased pleural effusion.  The patient had a humerus fracture in December and was found to have L sided pleural effusion but the patient was asymptomatic and was instructed to follow up with a pulmonologist outpatient.  When the patient returned for his humerus fracture repair surgery in January, the plan was to do a thoracentesis however the patient refused.  Over the past 1.5 weeks, the patient states he has had dyspnea on exertion, and just walking to the bathroom makes him short of breath.  He made an appointment with Dr. Pedraza and at her office today, the patient was dyspneic and ultrasound showed enlargement of the pleural effusion so she sent him to the ED.  In the ED, vital signs were /103, HR 81, RR 16, temperature 97.7 degrees F and saturating 98% on room air.  Labs significant for Na of 128.  CXR showed large L sided pleural effusion.  Pulmonary team consulted and placed a chest tube which drained 1.6L.  Patient admitted for further management of pleural effusion. 78 year old male with PMH BPH, left proximal humerus fracture in December repaired in January and pleural effusion who presents from Dr. Pedraza's office with worsening shortness of breath and enlargement of pleural effusion.  The patient had a humerus fracture in December and was found to have L sided pleural effusion but the patient was asymptomatic and was instructed to follow up with a pulmonologist outpatient.  When the patient returned for his humerus fracture repair surgery in January, the plan was to do a thoracentesis however the patient refused.  Over the past 1.5 weeks, the patient states he has had dyspnea on exertion, and just walking to the bathroom makes him short of breath.  He made an appointment with Dr. Pedraza and at her office today, the patient was dyspneic and ultrasound showed enlargement of the pleural effusion so she sent him to the ED.  In the ED, vital signs were /103, HR 81, RR 16, temperature 97.7 degrees F and saturating 98% on room air.  Labs significant for Na of 128.  CXR showed large L sided pleural effusion.  Pulmonary team consulted and placed a chest tube which drained 1.6L.  Patient admitted for further management of pleural effusion. 78 year old male with PMH BPH, left proximal humerus fracture in December repaired in January and pleural effusion who presents from Dr. Pedraza's office with worsening shortness of breath and enlargement of pleural effusion.  The patient had a humerus fracture in December and was incidentally found to have L sided pleural effusion but the patient was asymptomatic and was instructed to follow up with a pulmonologist outpatient.  When the patient returned for his humerus fracture repair surgery in January, the plan was to do a thoracentesis however the patient refused.  Over the past 1.5 weeks, the patient states he has had dyspnea on exertion, and just walking to the bathroom makes him short of breath.  He made an appointment with Dr. Pedraza and at her office today and the patient was dyspneic and ultrasound showed enlargement of the pleural effusion so she sent him to the ED.  In the ED, vital signs were /103, HR 81, RR 16, temperature 97.7 degrees F and saturating 98% on room air.  Labs significant for Na of 128.  CXR showed large L sided pleural effusion.  Pulmonary team consulted and placed a chest tube which drained 1.6L.  Patient admitted for further management of pleural effusion. 78 year old male with PMH BPH, left proximal humerus fracture in December repaired in January and pleural effusion who presents from Dr. Pedraza's office with worsening shortness of breath and enlargement of pleural effusion.  The patient had a humerus fracture in December and was incidentally found to have L sided pleural effusion but he was asymptomatic and was instructed to follow up with a pulmonologist as an outpatient.  When the patient returned for his humerus fracture repair surgery in January, the plan was to do a thoracentesis but the patient refused.  Over the past 1.5 weeks, the patient states he has had dyspnea on exertion, and just walking to the bathroom makes him short of breath.  He made an appointment with Dr. Pedraza and at her office today, he was dyspneic and an ultrasound showed enlargement of the pleural effusion so she sent him to the ED.  In the ED, vital signs were /103, HR 81, RR 16, temperature 97.7 degrees F and saturating 98% on room air.  Labs significant for Na of 128.  CXR showed large L sided pleural effusion.  Pulmonary team consulted and placed a chest tube which drained 1.6L.  Patient admitted for further management of pleural effusion.

## 2019-02-15 NOTE — CONSULT NOTE ADULT - ATTENDING COMMENTS
I agree with evaluation. This is an 79 yo Male with H/O fall. He was admitted with SOB associated with a chronic large left pleural effusion. My first thought was that the patient had developed a hemothorax. However, the HU for the fluid was 12, making it unlikely. There was no evidence of septations or visible loculations.   The patient is non-toxic and afebrile. He has dullness to percussion at the left base with absent breath sounds.  CXR and CT scans reviewed. Moderate sized left pleural effusion without rib fractures.   Plan:   1. We will put a chest tube in to drain the fluid.  2. Rule out hemothorax  3. Will remove as much fluid as possible over several hours, ensuring that the patient does not develop any respiratory distress as the fluid is being drained  4. Once fluid is drained, we will get a CT chest, 6MWT and full PFTs  Plan explained to the patient and his wife

## 2019-02-15 NOTE — CONSULT NOTE ADULT - SUBJECTIVE AND OBJECTIVE BOX
Patient is a 78y old  Male who presents with a chief complaint of SOB    HPI: 79 yo M with no significant PMH who presented to Dr. Pedraza's out pt clinic with progressively worsening SOB since fall with humeral neck fracture 2 months ago, but worsened over the last 3-4 days. Patient says he had a mechanical fall in mid December for which he sustained the fracture which was originally managed nonsurgically. However, follow up in January with worsened displacement and pt admitted for surgical repair. He also was found to have a chest wall hematoma which has now resolved Of note, since fracture in December his imaging has revealed a left sided pleural effusion of unclear etiology, no rib fx noted. During admission for surgical repair, plan was for diagnostic thoracentesis, but patient was discharged prior to any intervention. He never had pulmonary follow up for the effusion until his breathing began getting worse to the point that he was only able to walk 1/2 a block before stopping to catch his breath (prior to the fall he could walk 6-7 miles in [facility] without difficulty). He has no hx of heart disease, no hx of asthma/COPD, is a never smoker. Denies fever/chills, weight loss, night sweats, cough, or chest pain. His SOB is relieved with rest, admits to some mild orthopnea.     ROS : 12 pt ROS otherwise negative      PAST MEDICAL & SURGICAL HISTORY:  No pertinent past medical history  No significant past surgical history      FAMILY HISTORY:  No pertinent family history in first degree relatives      SOCIAL HISTORY:  Smoking Status: [ ] Current, [ ] Former, [ X] Never  Pack Years:          Allergies    No Known Allergies    Intolerances        Vital Signs Last 24 Hrs  T(C): 36.8 (15 Feb 2019 19:27), Max: 36.8 (15 Feb 2019 19:27)  T(F): 98.3 (15 Feb 2019 19:27), Max: 98.3 (15 Feb 2019 19:27)  HR: 68 (15 Feb 2019 19:27) (68 - 86)  BP: 135/82 (15 Feb 2019 19:27) (132/89 - 151/103)  BP(mean): --  RR: 16 (15 Feb 2019 19:27) (16 - 17)  SpO2: 95% (15 Feb 2019 19:27) (95% - 98%)    Physical exam  General - Elderly male, NAD, laying comfortably in bed  HEENT - MMM, EOMI, PERRL  Neck - no JVD, supple  Resp - dullness to percussion on left up to mid lung field with decreased BS, R side CTA  Heart - S1S2 RRR no M/R/G  Abd - soft, nondistended, nontender  Ext - trace pitting edema B/L, no clubbing/cyanosis  Skin - WWP, seborrheic keratosis noted  Neuro - no focal deficits, AAOx3    LABS:      CBC Full  -  ( 15 Feb 2019 18:10 )  WBC Count : 6.62 K/uL  Hemoglobin : 13.9 g/dL  Hematocrit : 41.8 %  Platelet Count - Automated : 244 K/uL  Mean Cell Volume : 95.0 fl  Mean Cell Hemoglobin : 31.6 pg  Mean Cell Hemoglobin Concentration : 33.3 gm/dL  Auto Neutrophil # : 3.91 K/uL  Auto Lymphocyte # : 1.67 K/uL  Auto Monocyte # : 0.80 K/uL  Auto Eosinophil # : 0.14 K/uL  Auto Basophil # : 0.08 K/uL  Auto Neutrophil % : 59.1 %  Auto Lymphocyte % : 25.2 %  Auto Monocyte % : 12.1 %  Auto Eosinophil % : 2.1 %  Auto Basophil % : 1.2 %    02-15    128<L>  |  92<L>  |  9   ----------------------------<  106<H>  4.7   |  24  |  0.86    Ca    9.7      15 Feb 2019 18:10    TPro  7.9  /  Alb  4.2  /  TBili  0.4  /  DBili  x   /  AST  27  /  ALT  28  /  AlkPhos  133<H>  02-15    PT/INR - ( 15 Feb 2019 18:10 )   PT: 12.0 sec;   INR: 1.06          PTT - ( 15 Feb 2019 18:10 )  PTT:31.9 sec              Bedside US: Large left simple appearing pleural effusion with thickening of the pleura noted. Diaphragm flattened with minimal excursion during inspiration    RADIOLOGY & ADDITIONAL STUDIES (The following images were personally reviewed):  CXR - Large left pleural effusion, chest tube in place    < from: Echocardiogram (01.17.19 @ 12:59) >  There is mild asymmetric septal ventricular hypertrophy.There is no   evidence   for left ventricular outflow obstruction.Probably normal left   ventricular wall   motion.Tissue doppler consistent with abnormal LV relaxation.The left   ventricular ejection fraction is estimated to be 60-65%The left atrial   size is   normal.Right atrial size is normal.The right ventricle is normal in size   and   function.Number of aortic valve cusps not well visualized.No evidence   for any   hemodynamically significant valvular disease.The inferior vena cava is   normal   in size (<2.1 cm) with normal inspiratory collapse (>50%) consistent with   normal right atrial pressure.  No doppler evidence of aortic   coarctation.Small   pericardial effusion adjacent to LV best seen in subcostal view.No   chamber   collapse seen.There is no echocardiographic evidence for cardiac   tamponade.    < end of copied text >

## 2019-02-15 NOTE — CONSULT NOTE ADULT - PROBLEM SELECTOR RECOMMENDATION 9
- Etiology unclear, differential diagnosis would include malignancy (though no clear primary). Infection is unlikely given lack of SIRS and no infectious type symptoms. CHF also unlikely as pt with normal echo 1 month ago. Can consider pt had pleural effusion 2/2 lung contusion after fall, but would expect fluid to be bloody.   - Pt developed significant cough after 1.6L drained, chest tube clamped. Will keep clamped overnight and attempt further drainage tomorrow. Post chest tube CXR with still large effusion. Given chronicity of effusion, may not tolerate complete drainage 2/2 inflammed and stiff pleura (trapped lung).   - F/U pleural studies including LDH, protein, glucose, cholesterol, cell count w/diff, pH, and culture  - F/U cytology  - Will need repeat Chest CT to  evaluate for underlying causes of effusion after further drainage is complete  - If pt develops acute SOB, please unclamp chest tube and assess for presence of bloody drainage (hemothorax) as this would be a surgical emergency and require CT surgery evaluation  - If unable to drain fluid 2/2 trapped lung, may require VATS with decortication  - Will continue to follow

## 2019-02-15 NOTE — ED ADULT NURSE REASSESSMENT NOTE - NS ED NURSE REASSESS COMMENT FT1
Patient resting in stretcher, pulmonologist at bedside with US. On CMM, labs sent, will continue to monitor. Patient resting in stretcher, pulmonologist at bedside with US. On CMM and cont pulse ox, labs sent, will continue to monitor.

## 2019-02-15 NOTE — H&P ADULT - NSHPREVIEWOFSYSTEMS_GEN_ALL_CORE
REVIEW OF SYSTEMS:    CONSTITUTIONAL: No weakness, fevers or chills  EYES/ENT: No visual changes;  No vertigo or throat pain   NECK: No pain or stiffness  RESPIRATORY: No cough, wheezing, hemoptysis; endorses dyspnea on exertion  CARDIOVASCULAR: No chest pain or palpitations  GASTROINTESTINAL: No abdominal or epigastric pain. No nausea, vomiting, or hematemesis; No diarrhea or constipation. No melena or hematochezia.  GENITOURINARY: No dysuria, frequency or hematuria  NEUROLOGICAL: No numbness or weakness  SKIN: No itching, burning, rashes, or lesions   All other review of systems is negative unless indicated above.

## 2019-02-15 NOTE — CONSULT NOTE ADULT - PROBLEM SELECTOR RECOMMENDATION 2
- likely due to large pleural effusion. Diaphragm assessed on US with complete flattening of L diaphragm and minimal excursion during inspiration  - Should improve with drainage of effusion.

## 2019-02-15 NOTE — PROCEDURE NOTE - PROCEDURE
<<-----Click on this checkbox to enter Procedure Chest tube placement with US guidance  02/15/2019    Active  [id number]

## 2019-02-15 NOTE — H&P ADULT - NSHPSOCIALHISTORY_GEN_ALL_CORE
Never smoker, denies drug use, alcohol use however would not quantify (previous chart states 1.5 bottles of wine per day)

## 2019-02-15 NOTE — ED ADULT NURSE NOTE - NSIMPLEMENTINTERV_GEN_ALL_ED
Implemented All Universal Safety Interventions:  [place] to call system. Call bell, personal items and telephone within reach. Instruct patient to call for assistance. Room bathroom lighting operational. Non-slip footwear when patient is off stretcher. Physically safe environment: no spills, clutter or unnecessary equipment. Stretcher in lowest position, wheels locked, appropriate side rails in place.

## 2019-02-15 NOTE — ED PROVIDER NOTE - OBJECTIVE STATEMENT
77 yo M with no significant PMH who presented to Dr. Pedraza's out pt clinic with progressively worsening SOB, bedside US in office concerning for very large pleural effusion. SOB has been since fall with humeral neck fracture 2 months ago, but worsened over the last 3-4 days. Patient says he had a mechanical fall in mid December for which he sustained the fracture which was originally managed nonsurgically. However, follow up in January with worsened displacement and pt admitted for surgical repair. He also was found to have a chest wall hematoma which has now resolved Of note, since fracture in December his imaging has revealed a left sided pleural effusion of unclear etiology, no rib fx noted. During admission for surgical repair, plan was for diagnostic thoracentesis, but patient was discharged prior to any intervention. He never had pulmonary follow up for the effusion until his breathing began getting worse to the point that he was only able to walk 1/2 a block before stopping to catch his breath ([place] He has no hx of heart disease, no hx of asthma/COPD, is a never smoker. Denies fever/chills, weight loss, night sweats, cough, or chest pain. His SOB is relieved with rest, admits to some mild orthopnea

## 2019-02-15 NOTE — H&P ADULT - NSHPLABSRESULTS_GEN_ALL_CORE
.  LABS:                         13.9   6.62  )-----------( 244      ( 15 Feb 2019 18:10 )             41.8     02-15    128<L>  |  92<L>  |  9   ----------------------------<  106<H>  4.7   |  24  |  0.86    Ca    9.7      15 Feb 2019 18:10    TPro  7.9  /  Alb  4.2  /  TBili  0.4  /  DBili  x   /  AST  27  /  ALT  28  /  AlkPhos  133<H>  02-15    PT/INR - ( 15 Feb 2019 18:10 )   PT: 12.0 sec;   INR: 1.06          PTT - ( 15 Feb 2019 18:10 )  PTT:31.9 sec              RADIOLOGY, EKG & ADDITIONAL TESTS: Reviewed.

## 2019-02-15 NOTE — ED ADULT NURSE NOTE - OBJECTIVE STATEMENT
77 y/o male c/o increase SoB over last week. AOx3, hx fall in Dec 19th. Was hospitalized and underwent reverse shoulder procedure in January 2019, noted dizziness after procedure. Had hematoma drained in February 2019 on left side. Presents to ED with dyspnea on exertion and diminished breath sounds left lower lobe. Went to pulmonologist this morning, US showed effusion on left side. Advised to come to [facility] ED for possible hemothorax and recommends thoracentesis and transition to chest tube. Denies chest pain, headache, dizziness, N/V. EKG done, peripheral IV placed, labs sent.

## 2019-02-16 ENCOUNTER — TRANSCRIPTION ENCOUNTER (OUTPATIENT)
Age: 79
End: 2019-02-16

## 2019-02-16 DIAGNOSIS — J93.9 PNEUMOTHORAX, UNSPECIFIED: ICD-10-CM

## 2019-02-16 LAB
ALBUMIN SERPL ELPH-MCNC: 3.3 G/DL — SIGNIFICANT CHANGE UP (ref 3.3–5)
ALP SERPL-CCNC: 104 U/L — SIGNIFICANT CHANGE UP (ref 40–120)
ALT FLD-CCNC: 21 U/L — SIGNIFICANT CHANGE UP (ref 10–45)
ANION GAP SERPL CALC-SCNC: 10 MMOL/L — SIGNIFICANT CHANGE UP (ref 5–17)
ANION GAP SERPL CALC-SCNC: 9 MMOL/L — SIGNIFICANT CHANGE UP (ref 5–17)
AST SERPL-CCNC: 22 U/L — SIGNIFICANT CHANGE UP (ref 10–40)
BILIRUB DIRECT SERPL-MCNC: <0.2 MG/DL — SIGNIFICANT CHANGE UP (ref 0–0.2)
BILIRUB INDIRECT FLD-MCNC: >0.2 MG/DL — SIGNIFICANT CHANGE UP (ref 0.2–1)
BILIRUB SERPL-MCNC: 0.4 MG/DL — SIGNIFICANT CHANGE UP (ref 0.2–1.2)
BUN SERPL-MCNC: 7 MG/DL — SIGNIFICANT CHANGE UP (ref 7–23)
BUN SERPL-MCNC: 7 MG/DL — SIGNIFICANT CHANGE UP (ref 7–23)
CALCIUM SERPL-MCNC: 8.6 MG/DL — SIGNIFICANT CHANGE UP (ref 8.4–10.5)
CALCIUM SERPL-MCNC: 9 MG/DL — SIGNIFICANT CHANGE UP (ref 8.4–10.5)
CHLORIDE SERPL-SCNC: 96 MMOL/L — SIGNIFICANT CHANGE UP (ref 96–108)
CHLORIDE SERPL-SCNC: 99 MMOL/L — SIGNIFICANT CHANGE UP (ref 96–108)
CHOLEST FLD-MCNC: 37 MG/DL — SIGNIFICANT CHANGE UP
CO2 SERPL-SCNC: 21 MMOL/L — LOW (ref 22–31)
CO2 SERPL-SCNC: 22 MMOL/L — SIGNIFICANT CHANGE UP (ref 22–31)
CREAT SERPL-MCNC: 0.76 MG/DL — SIGNIFICANT CHANGE UP (ref 0.5–1.3)
CREAT SERPL-MCNC: 0.81 MG/DL — SIGNIFICANT CHANGE UP (ref 0.5–1.3)
GLUCOSE SERPL-MCNC: 110 MG/DL — HIGH (ref 70–99)
GLUCOSE SERPL-MCNC: 97 MG/DL — SIGNIFICANT CHANGE UP (ref 70–99)
HCT VFR BLD CALC: 36.2 % — LOW (ref 39–50)
HGB BLD-MCNC: 12.2 G/DL — LOW (ref 13–17)
LDH SERPL L TO P-CCNC: 180 U/L — SIGNIFICANT CHANGE UP (ref 50–242)
MAGNESIUM SERPL-MCNC: 1.9 MG/DL — SIGNIFICANT CHANGE UP (ref 1.6–2.6)
MCHC RBC-ENTMCNC: 31.8 PG — SIGNIFICANT CHANGE UP (ref 27–34)
MCHC RBC-ENTMCNC: 33.7 GM/DL — SIGNIFICANT CHANGE UP (ref 32–36)
MCV RBC AUTO: 94.3 FL — SIGNIFICANT CHANGE UP (ref 80–100)
NRBC # BLD: 0 /100 WBCS — SIGNIFICANT CHANGE UP (ref 0–0)
OSMOLALITY SERPL: 263 MOSM/KG — LOW (ref 280–301)
OSMOLALITY UR: 229 MOSMOL/KG — SIGNIFICANT CHANGE UP (ref 100–650)
PLATELET # BLD AUTO: 211 K/UL — SIGNIFICANT CHANGE UP (ref 150–400)
POTASSIUM SERPL-MCNC: 4.2 MMOL/L — SIGNIFICANT CHANGE UP (ref 3.5–5.3)
POTASSIUM SERPL-MCNC: 4.2 MMOL/L — SIGNIFICANT CHANGE UP (ref 3.5–5.3)
POTASSIUM SERPL-SCNC: 4.2 MMOL/L — SIGNIFICANT CHANGE UP (ref 3.5–5.3)
POTASSIUM SERPL-SCNC: 4.2 MMOL/L — SIGNIFICANT CHANGE UP (ref 3.5–5.3)
PROT SERPL-MCNC: 6.2 G/DL — SIGNIFICANT CHANGE UP (ref 6–8.3)
RBC # BLD: 3.84 M/UL — LOW (ref 4.2–5.8)
RBC # FLD: 14.4 % — SIGNIFICANT CHANGE UP (ref 10.3–14.5)
SODIUM SERPL-SCNC: 127 MMOL/L — LOW (ref 135–145)
SODIUM SERPL-SCNC: 130 MMOL/L — LOW (ref 135–145)
SODIUM UR-SCNC: 48 MMOL/L — SIGNIFICANT CHANGE UP
WBC # BLD: 5.8 K/UL — SIGNIFICANT CHANGE UP (ref 3.8–10.5)
WBC # FLD AUTO: 5.8 K/UL — SIGNIFICANT CHANGE UP (ref 3.8–10.5)

## 2019-02-16 PROCEDURE — 99233 SBSQ HOSP IP/OBS HIGH 50: CPT

## 2019-02-16 PROCEDURE — 71045 X-RAY EXAM CHEST 1 VIEW: CPT | Mod: 26,76

## 2019-02-16 PROCEDURE — 99232 SBSQ HOSP IP/OBS MODERATE 35: CPT

## 2019-02-16 PROCEDURE — 71250 CT THORAX DX C-: CPT | Mod: 26

## 2019-02-16 RX ORDER — LANOLIN ALCOHOL/MO/W.PET/CERES
3 CREAM (GRAM) TOPICAL ONCE
Qty: 0 | Refills: 0 | Status: COMPLETED | OUTPATIENT
Start: 2019-02-16 | End: 2019-02-16

## 2019-02-16 RX ORDER — LANOLIN ALCOHOL/MO/W.PET/CERES
3 CREAM (GRAM) TOPICAL AT BEDTIME
Qty: 0 | Refills: 0 | Status: DISCONTINUED | OUTPATIENT
Start: 2019-02-16 | End: 2019-02-16

## 2019-02-16 RX ADMIN — Medication 3 MILLIGRAM(S): at 23:47

## 2019-02-16 RX ADMIN — Medication 3 MILLIGRAM(S): at 02:00

## 2019-02-16 NOTE — PROGRESS NOTE ADULT - PROBLEM SELECTOR PLAN 1
Pulmonary team consulted and chest tube placed, s/p 1.8 L on initial drainage, then 2.6L drained today. Repeat imaging showing moderate-large left sided pneumothorax.  -CT surgery consulted, pneumothorax drained but pt's left lung not yet re-inflated  -transudative by Light's criteria  -follow up additional pleural fluid studies still in progress: cell count with diff, pH, culture, gram stain, ADA, AFB, fungal culture  -will require repeat chest CT after more drainage, tube must remain to constant suction while in transport and in imaging  - repeat ECHO  - f/u addl pulm recs

## 2019-02-16 NOTE — PROGRESS NOTE ADULT - PROBLEM SELECTOR PLAN 1
- present since fall 2 months ago  - Effusion consistent with transudative, may be post inflammatory 2/2 fall. Awaiting cell count w/ diff  - Will need to F/U cytology  - Repeat Chest CT once lung has re-expanded.  - After drainage today, CXR showed complete resolution of effusion but with large pneumothorax, vital signs stable  - Chest tube placed back on suction, will repeat another CXR in 2 hours  - Unclear etiology - no leak apparent in tubing, may have entrained air during drainage, vs pt developed bronchopleurofistula 2/2 airway trauma after fall  - Keep chest tube to suction for now and follow up repeat imaging - present since fall 2 months ago  - Effusion consistent with transudative, may be post inflammatory 2/2 fall. Awaiting cell count w/ diff  - Will need to F/U cytology  - Repeat Chest CT once lung has re-expanded.  - After drainage today, CXR showed complete resolution of effusion but with large pneumothorax, vital signs stable  - Chest tube placed back on suction, will repeat another CXR in 2 hours  - Unclear etiology - no leak apparent in tubing, may have entrained air during drainage, vs pt developed bronchopleurofistula 2/2 airway trauma after fall  - Keep chest tube to suction for now and follow up repeat imaging    **ADDENDUM** persistent pneumothorax. Most recent CXR with slightly improved lung expansion concerning for underlying mass (may be compressed vascular of lungs, but endobronchial lesion would also explain inability to re-expand lung). Stat Chest CT noncon. Chest tube must remain on continuous suction during transport and during imaging. Will transfer to telemetry given persistent pneumothorax and consult CT surgery.

## 2019-02-16 NOTE — DISCHARGE NOTE ADULT - CARE PLAN
Principal Discharge DX:	Pleural effusion  Secondary Diagnosis:	Hyponatremia  Secondary Diagnosis:	Pericardial effusion Principal Discharge DX:	Pleural effusion  Goal:	Improvement of shortness of breath  Assessment and plan of treatment:	You came into the hospital for shortness of breath and were found to have a large pleural effusion (buildup of fluid in the lung space). You had a chest tube put in and overall it drained 4 L of fluid from this area. You had many chest Xrays to check both the progress of this and the pneumothorax. Please follow-up with Dr Coronado, Dr. Pedraza, and for one more chest Xray as noted above.  Secondary Diagnosis:	Hyponatremia  Assessment and plan of treatment:	When you came into the ospital, it was noted that you had a low level of sodium in your body. Your fluids were restricted and you were given salty tabs with improvement of this condition. Please follow-up with your outpatient doctor to follow-up on this issue.  Secondary Diagnosis:	BPH (benign prostatic hyperplasia)  Assessment and plan of treatment:	You have a known history of BPH for which you take Flomax at home. You were given this medication in the hospital and should continue to use this medication when you return home. Please follow-up with your outpatient providers regarding this condition.  Secondary Diagnosis:	Pneumothorax  Assessment and plan of treatment:	After you had your chest tube placed, it was noted that you had a pneumothorax. You have been getting many chest XRays during this hospitalization to track the progression of the pneumothorax. You will need close follow-up with your providers as noted above and will need to get one more chest XRay on Friday morning to track its progression or resolution.

## 2019-02-16 NOTE — DISCHARGE NOTE ADULT - PLAN OF CARE
Improvement of shortness of breath You came into the hospital for shortness of breath and were found to have a large pleural effusion (buildup of fluid in the lung space). You had a chest tube put in and overall it drained 4 L of fluid from this area. You had many chest Xrays to check both the progress of this and the pneumothorax. Please follow-up with Dr Coronado, Dr. Pedraza, and for one more chest Xray as noted above. When you came into the ospital, it was noted that you had a low level of sodium in your body. Your fluids were restricted and you were given salty tabs with improvement of this condition. Please follow-up with your outpatient doctor to follow-up on this issue. You have a known history of BPH for which you take Flomax at home. You were given this medication in the hospital and should continue to use this medication when you return home. Please follow-up with your outpatient providers regarding this condition. After you had your chest tube placed, it was noted that you had a pneumothorax. You have been getting many chest XRays during this hospitalization to track the progression of the pneumothorax. You will need close follow-up with your providers as noted above and will need to get one more chest XRay on Friday morning to track its progression or resolution.

## 2019-02-16 NOTE — DISCHARGE NOTE ADULT - HOSPITAL COURSE
78 year old male with PMH BPH, left proximal humerus fracture in December repaired in January and pleural effusion who presents from outpatient pulmonologist's office with worsening shortness of breath and enlargement of left-sided pleural effusion. ED Vitals: T 97.7F, 81 bpm, /103, RR 16, 98% on room air. Labs in the ED were significant for Na of 128. CXR showed large L sided pleural effusion. Pulmonary was consulted and placed a chest tube which drained 1.6L.  Patient admitted for further management of pleural effusion. This patient is an 78 year old male with PMH BPH, left proximal humerus fracture in December repaired in January and pleural effusion who presents from outpatient pulmonologist's office with worsening shortness of breath and enlargement of left-sided pleural effusion. ED Vitals: T 97.7F, 81 bpm, /103, RR 16, 98% on room air. Labs in the ED were significant for Na of 128. CXR showed large L sided pleural effusion. Pulmonary was consulted and placed a chest tube which drained 1.6L immediately.  Patient admitted for further management of pleural effusion. In early afternoon the day after admission, chest tube drained an additional 2.6 L; a repeat CXR afterwards was significant for a large left pneumothorax, likely 2/2 to instrumentation. Chest tube While vital signs remained stable and the patient remained comfortable on nasal cannula, a repeat CXR 2 hours later did not demonstrate full reinflation of lung, so patient was stepped up to [facility] for further management. Pt still without resolution of pneumothorax, clamped on 2/18 but repeat still with pneumo, placed back on suction overnight, off suction in AM 2/19 but still without resolution of pneumo. Pt has been ambulating frequently and using incentive spirometer very frequently. Patient had chest tube taken out on 2/20, and has close follow-up with both pulm and thoracic surgery. Will also come in for a repeat CXR on 2/22 to check progression/regression of pneumo.

## 2019-02-16 NOTE — CONSULT NOTE ADULT - SUBJECTIVE AND OBJECTIVE BOX
Surgeon: Dr. Coronado     Requesting Physician: Dr. Stover     HISTORY OF PRESENT ILLNESS (Need 4):  78 year old male with PMH BPH, left proximal humerus fracture in December repaired in January and pleural effusion who presents from Dr. Pedraza's office with worsening shortness of breath and enlargement of pleural effusion.  The patient had a humerus fracture in December and was incidentally found to have L sided pleural effusion but he was asymptomatic and was instructed to follow up with a pulmonologist as an outpatient.  When the patient returned for his humerus fracture repair surgery in January, the plan was to do a thoracentesis but the patient refused.  Over the past 1.5 weeks, the patient states he has had dyspnea on exertion, and just walking to the bathroom makes him short of breath.  He made an appointment with Dr. Pedraza and at her office and was found to be dyspneic and an ultrasound showed enlargement of the pleural effusion so she sent him to the ED.  In the ED, vital signs were /103, HR 81, RR 16, temperature 97.7 degrees F and saturating 98% on room air. XR showed large L sided pleural effusion.  Pulmonary team consulted and placed a chest tube which drained 1.6L of serous fluid.  Patient admitted for further management of pleural effusion. Post placement CXR showed complete resolution of effusion but with large pneumothorax. Thoracic surgery consulted for evaluation. Patient now being monitored and awaiting possible CT scan for further evaluation     PAST MEDICAL & SURGICAL HISTORY:  BPH (benign prostatic hyperplasia)  Humerus fracture  Pleural effusion  Humerus fracture      MEDICATIONS  (STANDING):  aspirin enteric coated 81 milliGRAM(s) Oral daily  heparin  Injectable 5000 Unit(s) SubCutaneous every 8 hours  tamsulosin 0.4 milliGRAM(s) Oral at bedtime    MEDICATIONS  (PRN):      Allergies    No Known Allergies    Intolerances        SOCIAL HISTORY:  Smoker:  NO     ETOH use:  YES              FREQUENCY / QUANTITY: social   Ilicit Drug use:  NO  Occupation:  Assisted device use (Cane / Walker): none  Live with:    FAMILY HISTORY:  No pertinent family history in first degree relatives      Review of Systems (Need 10):  CONSTITUTIONAL: Denies fevers / chills, sweats, fatigue, weight loss, weight gain                                       NEURO:  Denies parathesias, seizures, syncope, confusion                                                                                  EYES:  Denies blurry vision, discharge, pain, loss of vision                                                                                    ENMT:  Denies difficulty hearing, vertigo, dysphagia, epistaxis, recent dental work                                       CV:  +FUENTES, Denies chest pain, palpitations,  orthopnea                                                                                           RESPIRATORY: +SOB, Denies wWheezing, cough / sputum, hemoptysis                                                               GI:  Denies nausea, vomiting, diarrhea, constipation, melena                                                                          : Denies hematuria, dysuria, urgency, incontinence                                                                                          MUSKULOSKELETAL:  Denies arthritis, joint swelling, muscle weakness                                                             SKIN/BREAST:  Denies rash, itching, hair loss, masses                                                                                              PSYCH:  Denies depression, anxiety, suicidal ideation                                                                                                HEME/LYMPH:  Denies bruises easily, enlarged lymph nodes, tender lymph nodes                                          ENDOCRINE:  Denies cold intolerance, heat intolerance, polydipsia                                                                      Vital Signs Last 24 Hrs  T(C): 36.7 (16 Feb 2019 16:01), Max: 37.1 (16 Feb 2019 05:13)  T(F): 98 (16 Feb 2019 16:01), Max: 98.7 (16 Feb 2019 05:13)  HR: 65 (16 Feb 2019 16:01) (65 - 86)  BP: 148/96 (16 Feb 2019 16:01) (111/89 - 153/96)  BP(mean): --  RR: 18 (16 Feb 2019 16:01) (16 - 18)  SpO2: 95% (16 Feb 2019 16:01) (93% - 97%)    Physical Exam (Need 8)  CONSTITUTIONAL:                                                                          WNL  NEURO:                                                                                             WNL                      EYES:                                                                                                  WNL  ENMT:                                                                                                WNL  CV:                                                                                                      WNL  RESPIRATORY:                                                                                  WNL  GI:                                                                                                       WNL  : CUELLAR + / -                                                                                 WNL  MUSKULOSKELETAL:                                                                       WNL  SKIN / BREAST:                                                                                 WNL                                                          LABS:                        12.2   5.80  )-----------( 211      ( 16 Feb 2019 08:04 )             36.2     02-16    127<L>  |  96  |  7   ----------------------------<  97  4.2   |  22  |  0.76    Ca    9.0      16 Feb 2019 08:04  Mg     1.9     02-16    TPro  6.2  /  Alb  3.3  /  TBili  0.4  /  DBili  <0.2  /  AST  22  /  ALT  21  /  AlkPhos  104  02-16    PT/INR - ( 15 Feb 2019 18:10 )   PT: 12.0 sec;   INR: 1.06          PTT - ( 15 Feb 2019 18:10 )  PTT:31.9 sec            RADIOLOGY & ADDITIONAL STUDIES:    CXR:  Portable examination the chest demonstrates a be status post placement   left chest tube. Left effusion relatively unchanged in comparison to   prior examination of the chest 1/17/2019. Status post left humeral joint   replacement. Degenerative changes thoracic spine      CT Scan:  pending    TTE / LATRICIA:  pending        Assesment:    78 year old male with PMH BPH, left proximal humerus fracture in December repaired in January and pleural effusion who presents from Dr. Pedraza's office with worsening shortness of breath and enlargement of pleural effusion.  The patient had a humerus fracture in December and was incidentally found to have L sided pleural effusion but he was asymptomatic and was instructed to follow up with a pulmonologist as an outpatient.  When the patient returned for his humerus fracture repair surgery in January, the plan was to do a thoracentesis but the patient refused.  Over the past 1.5 weeks, the patient states he has had dyspnea on exertion, and just walking to the bathroom makes him short of breath.  He made an appointment with Dr. Pedraza and at her office and was found to be dyspneic and an ultrasound showed enlargement of the pleural effusion so she sent him to the ED.  In the ED, vital signs were /103, HR 81, RR 16, temperature 97.7 degrees F and saturating 98% on room air. XR showed large L sided pleural effusion.  Pulmonary team consulted and placed a chest tube which drained 1.6L of serous fluid.  Patient admitted for further management of pleural effusion. Post placement CXR showed complete resolution of effusion but with large pneumothorax. Thoracic surgery consulted for evaluation. Patient now being monitored and awaiting possible CT scan for further evaluation       Plan: pleural effusion   Problem 1:      Problem 2: pneumothorax       Problem 3:      Problem 4:    I have reviewed clinical labs tests and reports, radiology tests and reports, as well as old patient medical records, and discussed with the refering physician. Surgeon: Dr. Coronado     Requesting Physician: Dr. Stover     HISTORY OF PRESENT ILLNESS (Need 4):  78 year old male with PMH BPH, left proximal humerus fracture in December repaired in January and pleural effusion who presents from Dr. Pedraza's office with worsening shortness of breath and enlargement of pleural effusion.  The patient had a humerus fracture in December and was incidentally found to have L sided pleural effusion but he was asymptomatic and was instructed to follow up with a pulmonologist as an outpatient.  When the patient returned for his humerus fracture repair surgery in January, the plan was to do a thoracentesis but the patient refused.  Over the past 1.5 weeks, the patient states he has had dyspnea on exertion, and just walking to the bathroom makes him short of breath.  He made an appointment with Dr. Pedraza and at her office and was found to be dyspneic and an ultrasound showed enlargement of the pleural effusion so she sent him to the ED.  In the ED, vital signs were /103, HR 81, RR 16, temperature 97.7 degrees F and saturating 98% on room air. XR showed large L sided pleural effusion.  Pulmonary team consulted and placed a chest tube which drained 1.6L of serous fluid.  Patient admitted for further management of pleural effusion. Post placement CXR showed complete resolution of effusion but with large pneumothorax. Thoracic surgery consulted for evaluation. Patient now being monitored and awaiting possible CT scan for further evaluation     PAST MEDICAL & SURGICAL HISTORY:  BPH (benign prostatic hyperplasia)  Humerus fracture  Pleural effusion  Humerus fracture      MEDICATIONS  (STANDING):  aspirin enteric coated 81 milliGRAM(s) Oral daily  heparin  Injectable 5000 Unit(s) SubCutaneous every 8 hours  tamsulosin 0.4 milliGRAM(s) Oral at bedtime    MEDICATIONS  (PRN):      Allergies    No Known Allergies    Intolerances        SOCIAL HISTORY:  Smoker:  NO     ETOH use:  YES              FREQUENCY / QUANTITY: social   Ilicit Drug use:  NO  Occupation:  Assisted device use (Cane / Walker): none  Live with:    FAMILY HISTORY:  No pertinent family history in first degree relatives      Review of Systems (Need 10):  CONSTITUTIONAL: Denies fevers / chills, sweats, fatigue, weight loss, weight gain                                       NEURO:  Denies parathesias, seizures, syncope, confusion                                                                                  EYES:  Denies blurry vision, discharge, pain, loss of vision                                                                                    ENMT:  Denies difficulty hearing, vertigo, dysphagia, epistaxis, recent dental work                                       CV:  +FUENTES, Denies chest pain, palpitations,  orthopnea                                                                                           RESPIRATORY: +SOB, Denies wWheezing, cough / sputum, hemoptysis                                                               GI:  Denies nausea, vomiting, diarrhea, constipation, melena                                                                          : Denies hematuria, dysuria, urgency, incontinence                                                                                          MUSKULOSKELETAL:  Denies arthritis, joint swelling, muscle weakness                                                             SKIN/BREAST:  Denies rash, itching, hair loss, masses                                                                                              PSYCH:  Denies depression, anxiety, suicidal ideation                                                                                                HEME/LYMPH:  Denies bruises easily, enlarged lymph nodes, tender lymph nodes                                          ENDOCRINE:  Denies cold intolerance, heat intolerance, polydipsia                                                                      Vital Signs Last 24 Hrs  T(C): 36.7 (16 Feb 2019 16:01), Max: 37.1 (16 Feb 2019 05:13)  T(F): 98 (16 Feb 2019 16:01), Max: 98.7 (16 Feb 2019 05:13)  HR: 65 (16 Feb 2019 16:01) (65 - 86)  BP: 148/96 (16 Feb 2019 16:01) (111/89 - 153/96)  BP(mean): --  RR: 18 (16 Feb 2019 16:01) (16 - 18)  SpO2: 95% (16 Feb 2019 16:01) (93% - 97%)    Physical Exam (Need 8)  CONSTITUTIONAL:                                                                          WNL  NEURO:                                                                                             WNL                      EYES:                                                                                                  WNL  ENMT:                                                                                                WNL  CV:                                                                                                      WNL  RESPIRATORY:                                                                                  WNL  GI:                                                                                                       WNL  : CUELLAR + / -                                                                                 WNL  MUSKULOSKELETAL:                                                                       WNL  SKIN / BREAST:                                                                                 WNL                                                          LABS:                        12.2   5.80  )-----------( 211      ( 16 Feb 2019 08:04 )             36.2     02-16    127<L>  |  96  |  7   ----------------------------<  97  4.2   |  22  |  0.76    Ca    9.0      16 Feb 2019 08:04  Mg     1.9     02-16    TPro  6.2  /  Alb  3.3  /  TBili  0.4  /  DBili  <0.2  /  AST  22  /  ALT  21  /  AlkPhos  104  02-16    PT/INR - ( 15 Feb 2019 18:10 )   PT: 12.0 sec;   INR: 1.06          PTT - ( 15 Feb 2019 18:10 )  PTT:31.9 sec            RADIOLOGY & ADDITIONAL STUDIES:    CXR:  Portable examination the chest demonstrates a be status post placement   left chest tube. Left effusion relatively unchanged in comparison to   prior examination of the chest 1/17/2019. Status post left humeral joint   replacement. Degenerative changes thoracic spine      CT Scan:  pending    TTE / LATRICIA:  pending        Assesment:    78 year old male with PMH BPH, left proximal humerus fracture in December repaired in January and pleural effusion who presents from Dr. Pedraza's office with worsening shortness of breath and enlargement of pleural effusion.  The patient had a humerus fracture in December and was incidentally found to have L sided pleural effusion but he was asymptomatic and was instructed to follow up with a pulmonologist as an outpatient.  When the patient returned for his humerus fracture repair surgery in January, the plan was to do a thoracentesis but the patient refused.  Over the past 1.5 weeks, the patient states he has had dyspnea on exertion, and just walking to the bathroom makes him short of breath.  He made an appointment with Dr. ePdraza and at her office and was found to be dyspneic and an ultrasound showed enlargement of the pleural effusion so she sent him to the ED.  In the ED, vital signs were /103, HR 81, RR 16, temperature 97.7 degrees F and saturating 98% on room air. XR showed large L sided pleural effusion.  Pulmonary team consulted and placed a chest tube which drained 1.6L of serous fluid.  Patient admitted for further management of pleural effusion. Post placement CXR showed complete resolution of effusion but with large pneumothorax. Thoracic surgery consulted for evaluation. Patient now being monitored and awaiting possible CT scan for further evaluation       Plan: pleural effusion   - will continue to follow  - awaiting CT scan for further evaluation     - CT management as per pulmonary team Surgeon: Dr. Coronado     Requesting Physician: Dr. Stovre     HISTORY OF PRESENT ILLNESS:  78 year old male with PMH BPH, left proximal humerus fracture in December repaired in January and pleural effusion who presents from Dr. Pedraza's office with worsening shortness of breath and enlargement of pleural effusion.      The patient had a humerus fracture in December after a syncopal fall. Work up done, thought to be secondary to dehydration. At that time he was incidentally found to have L sided pleural effusion but he was asymptomatic and was instructed to follow up with a pulmonologist as an outpatient.  When the patient returned for his humerus fracture repair surgery in January, the plan was to do a thoracentesis but the patient refused.  Over the past 1.5 weeks, the patient states he has had dyspnea on exertion, and just walking to the bathroom makes him short of breath.  He made an appointment with Dr. Pedraza and at her office and was found to be dyspneic and an ultrasound showed enlargement of the pleural effusion so she sent him to the ED.  Onset of FUENTES is 1.5 weeks, progressively worse, worse with activity, improves with rest. Quality is moderate to severe. No radiation. No associated symptoms.     In the ED, vital signs were /103, HR 81, RR 16, temperature 97.7 degrees F and saturating 98% on room air. XR showed large L sided pleural effusion.  Pulmonary team consulted and placed a chest tube which drained 1.6L of serous fluid.  Patient admitted for further management of pleural effusion. Post placement CXR showed complete resolution of effusion but with large pneumothorax. Thoracic surgery consulted for evaluation. Patient now being monitored and awaiting possible CT scan for further evaluation     PAST MEDICAL & SURGICAL HISTORY:  BPH (benign prostatic hyperplasia)  Humerus fracture  Pleural effusion  Humerus fracture      MEDICATIONS  (STANDING):  aspirin enteric coated 81 milliGRAM(s) Oral daily  heparin  Injectable 5000 Unit(s) SubCutaneous every 8 hours  tamsulosin 0.4 milliGRAM(s) Oral at bedtime    MEDICATIONS  (PRN):      Allergies    No Known Allergies    Intolerances        SOCIAL HISTORY:  Smoker:  NO     ETOH use:  YES              FREQUENCY / QUANTITY: social   Ilicit Drug use:  NO  Occupation:  Assisted device use (Cane / Walker): none  Live with:    FAMILY HISTORY:  No pertinent family history in first degree relatives      Review of Systems (Need 10):  CONSTITUTIONAL: Denies fevers / chills, sweats, fatigue, weight loss, weight gain                                       NEURO:  Denies parathesias, seizures, syncope, confusion                                                                                  EYES:  Denies blurry vision, discharge, pain, loss of vision                                                                                    ENMT:  Denies difficulty hearing, vertigo, dysphagia, epistaxis, recent dental work                                       CV:  +FUENTES, Denies chest pain, palpitations,  orthopnea                                                                                           RESPIRATORY: +SOB, Denies Wheezing, cough / sputum, hemoptysis                                                               GI:  Denies nausea, vomiting, diarrhea, constipation, melena                                                                          : Denies hematuria, dysuria, urgency, incontinence                                                                                          MUSKULOSKELETAL:  Denies arthritis, joint swelling, muscle weakness                                                             SKIN/BREAST:  Denies rash, itching, hair loss, masses                                                                                              PSYCH:  Denies depression, anxiety, suicidal ideation                                                                                                HEME/LYMPH:  Denies bruises easily, enlarged lymph nodes, tender lymph nodes                                          ENDOCRINE:  Denies cold intolerance, heat intolerance, polydipsia                                                                      Vital Signs Last 24 Hrs  T(C): 36.7 (16 Feb 2019 16:01), Max: 37.1 (16 Feb 2019 05:13)  T(F): 98 (16 Feb 2019 16:01), Max: 98.7 (16 Feb 2019 05:13)  HR: 65 (16 Feb 2019 16:01) (65 - 86)  BP: 148/96 (16 Feb 2019 16:01) (111/89 - 153/96)  BP(mean): --  RR: 18 (16 Feb 2019 16:01) (16 - 18)  SpO2: 95% (16 Feb 2019 16:01) (93% - 97%)    Physical Exam   CONSTITUTIONAL:  NAD  NEURO:  alert and oriented, non focal                     EYES:  EOMI, PERRL  ENMT:  Hearing grossly intact, oropharynx benign  CV: RRR, no m/r/g  RESPIRATORY: diminished at left base, Pigtail in place, no airleak  GI:+ BS, soft, non rosalie  : no dhillon  MUSCULOSKELETAL strength 5/5 and equal in bilateral upper and lower extremiteis  SKIN: no lesions or rashes                                                          LABS:                        12.2   5.80  )-----------( 211      ( 16 Feb 2019 08:04 )             36.2     02-16    127<L>  |  96  |  7   ----------------------------<  97  4.2   |  22  |  0.76    Ca    9.0      16 Feb 2019 08:04  Mg     1.9     02-16    TPro  6.2  /  Alb  3.3  /  TBili  0.4  /  DBili  <0.2  /  AST  22  /  ALT  21  /  AlkPhos  104  02-16    PT/INR - ( 15 Feb 2019 18:10 )   PT: 12.0 sec;   INR: 1.06          PTT - ( 15 Feb 2019 18:10 )  PTT:31.9 sec            RADIOLOGY & ADDITIONAL STUDIES:    CXR:  Portable examination the chest demonstrates a be status post placement   left chest tube. Left effusion relatively unchanged in comparison to   prior examination of the chest 1/17/2019. Status post left humeral joint   replacement. Degenerative changes thoracic spine      CT Scan:  pending    TTE / LATRICIA:  pending

## 2019-02-16 NOTE — DISCHARGE NOTE ADULT - MEDICATION SUMMARY - MEDICATIONS TO TAKE
I will START or STAY ON the medications listed below when I get home from the hospital:    aspirin 81 mg oral tablet  -- 1 tab(s) by mouth once a day  -- Indication: For Need for prophylactic measure    tamsulosin 0.4 mg oral capsule  -- 1 cap(s) by mouth once a day (at bedtime)  -- Indication: For BPH (benign prostatic hyperplasia)    docusate sodium 100 mg oral capsule  -- 1 cap(s) by mouth every 12 hours  -- Indication: For Constipation    Vitamin D3  -- Indication: For Nutrition, metabolism, and development symptoms

## 2019-02-16 NOTE — PROGRESS NOTE ADULT - SUBJECTIVE AND OBJECTIVE BOX
TRANSFER NOTE: 4 URIS TO [facility]    HOSPITAL COURSE: 78 year old male with PMH BPH, left proximal humerus fracture in December repaired in January and pleural effusion who presents from outpatient pulmonologist's office with worsening shortness of breath and enlargement of left-sided pleural effusion. ED Vitals: T 97.7F, 81 bpm, /103, RR 16, 98% on room air. Labs in the ED were significant for Na of 128. CXR showed large L sided pleural effusion. Pulmonary was consulted and placed a chest tube which drained 1.6L immediately.  Patient admitted for further management of pleural effusion. In early afternoon the day after admission, chest tube drained an additional 2.6 L; a repeat CXR afterwards was significant for a large left pneumothorax, likely 2/2 to instrumentation. Chest tube While vital signs remained stable and the patient remained comfortable on nasal cannula, a repeat CXR 2 hours later did not demonstrate full reinflation of lung, so patient was stepped up to [facility] for further management.     SUBJECTIVE / INTERVAL HPI: Patient seen and examined at bedside. Still has SOB, but much better than at admission, as well as better than earlier today. Denies fever, chills, CP, palpitations, cough, abd pain, N&V, diarrhea.    VITAL SIGNS:  Vital Signs Last 24 Hrs  T(C): 36.7 (16 Feb 2019 16:01), Max: 37.1 (16 Feb 2019 05:13)  T(F): 98 (16 Feb 2019 16:01), Max: 98.7 (16 Feb 2019 05:13)  HR: 65 (16 Feb 2019 16:01) (65 - 86)  BP: 148/96 (16 Feb 2019 16:01) (111/89 - 153/96)  BP(mean): --  RR: 18 (16 Feb 2019 16:01) (16 - 18)  SpO2: 95% (16 Feb 2019 16:01) (93% - 98%)    PHYSICAL EXAM:  General: Adult White male adult in NAD  HEENT: MMM  Cardiovascular: RRR; no rub  Respiratory: trachea midline; lung sounds decreased L lung in all fields; no accessory muscle use  Gastrointestinal: protuberant; soft, NT/ND  Extremities: WWP; trace pedal edema  Neurological: AAOx3; following commands; no focal deficits  Lines/Drains/Tubes: Chest tube draining serous fluid from L chest    MEDICATIONS:  MEDICATIONS  (STANDING):  aspirin enteric coated 81 milliGRAM(s) Oral daily  heparin  Injectable 5000 Unit(s) SubCutaneous every 8 hours  tamsulosin 0.4 milliGRAM(s) Oral at bedtime    MEDICATIONS  (PRN):      ALLERGIES:  Allergies    No Known Allergies    Intolerances        LABS:                        12.2   5.80  )-----------( 211      ( 16 Feb 2019 08:04 )             36.2     02-16    127<L>  |  96  |  7   ----------------------------<  97  4.2   |  22  |  0.76    Ca    9.0      16 Feb 2019 08:04  Mg     1.9     02-16    TPro  6.2  /  Alb  3.3  /  TBili  0.4  /  DBili  <0.2  /  AST  22  /  ALT  21  /  AlkPhos  104  02-16    PT/INR - ( 15 Feb 2019 18:10 )   PT: 12.0 sec;   INR: 1.06          PTT - ( 15 Feb 2019 18:10 )  PTT:31.9 sec    CAPILLARY BLOOD GLUCOSE          RADIOLOGY & ADDITIONAL TESTS: Reviewed.

## 2019-02-16 NOTE — PROGRESS NOTE ADULT - PROBLEM SELECTOR PLAN 4
- No IVF   - Will replete to K>4 and Mg>2  - Regular diet with 1.0 L fluid restriction  - Dispo: RMF pending repeat labs & CXR  - Code Status: Full Code

## 2019-02-16 NOTE — PROGRESS NOTE ADULT - SUBJECTIVE AND OBJECTIVE BOX
OVERNIGHT EVENTS: Admitted overnight.    SUBJECTIVE / INTERVAL HPI: Patient seen and examined at bedside. SOB subjectively improved today with chest tube in place. Denies fever, chills, HA, CP, abd pain, N&V, diarrhea, constipation, dysuria.    VITAL SIGNS:  Vital Signs Last 24 Hrs  T(C): 37.1 (16 Feb 2019 08:36), Max: 37.1 (16 Feb 2019 05:13)  T(F): 98.7 (16 Feb 2019 08:36), Max: 98.7 (16 Feb 2019 05:13)  HR: 72 (16 Feb 2019 08:36) (68 - 86)  BP: 132/85 (16 Feb 2019 08:36) (111/89 - 153/96)  BP(mean): --  RR: 18 (16 Feb 2019 08:36) (16 - 18)  SpO2: 94% (16 Feb 2019 08:36) (93% - 98%)    PHYSICAL EXAM:  General: Adult White male adult in NAD  HEENT: MMM  Cardiovascular: RRR; no rub  Respiratory: lung sounds decreased at left lung base; no wheezing, rales or rhonchi  Gastrointestinal: protuberant; soft, NT/ND  Extremities: WWP; trace pedal edema  Neurological: AAOx3; following commands; no focal deficits  Lines/Drains/Tubes: Chest tube draining serous fluid from L chest, placed to low intermittent suction; peripheral IV x1    MEDICATIONS:  MEDICATIONS  (STANDING):  aspirin enteric coated 81 milliGRAM(s) Oral daily  heparin  Injectable 5000 Unit(s) SubCutaneous every 8 hours  tamsulosin 0.4 milliGRAM(s) Oral at bedtime    MEDICATIONS  (PRN):      ALLERGIES:  Allergies    No Known Allergies    Intolerances        LABS:                        12.2   5.80  )-----------( 211      ( 16 Feb 2019 08:04 )             36.2     02-16    127<L>  |  96  |  7   ----------------------------<  97  4.2   |  22  |  0.76    Ca    9.0      16 Feb 2019 08:04  Mg     1.9     02-16    TPro  6.2  /  Alb  3.3  /  TBili  0.4  /  DBili  <0.2  /  AST  22  /  ALT  21  /  AlkPhos  104  02-16    PT/INR - ( 15 Feb 2019 18:10 )   PT: 12.0 sec;   INR: 1.06          PTT - ( 15 Feb 2019 18:10 )  PTT:31.9 sec    CAPILLARY BLOOD GLUCOSE          RADIOLOGY & ADDITIONAL TESTS: Reviewed.

## 2019-02-16 NOTE — DISCHARGE NOTE ADULT - PROVIDER TOKENS
PROVIDER:[TOKEN:[[id number]]] PROVIDER:[TOKEN:[[id number]]],PROVIDER:[TOKEN:[[id number]]]

## 2019-02-16 NOTE — DISCHARGE NOTE ADULT - ADDITIONAL INSTRUCTIONS
Please follow up with your outpatient pulmonologist within 1 week of discharge. Dr. Pedraza's office is closed today; you should call them for an appointment when they reopen on Tuesday.    Please follow up with your primary care provider Dr. Huitron within 1-2 weeks. Please follow up with Dr Coronado on 3/1/19. please follow-up with Dr. Pedraza's office on ; please return to get an outpatient chest XRay on Friday 2/22as explained to you by the pulmonology doctors.     Please follow up with your primary care provider Dr. Huitron within 1-2 weeks. 1. Please follow up with Dr Coronado on 3/1/19 @ 9:30 AM  2. Please follow-up with Dr. Pedraza's office on 3/1/19 @1 PM @ [address] ([phone])  3. Please return to get an outpatient chest XRay on Friday 2/22 as explained to you by the pulmonology doctors.   4. Please follow up with your primary care provider Dr. Huitron within 1-2 weeks.

## 2019-02-16 NOTE — DISCHARGE NOTE ADULT - PATIENT PORTAL LINK FT
You can access the [facility] Patient Portal, offered by [facility], by registering with the following website: [email or web address]

## 2019-02-16 NOTE — PROGRESS NOTE ADULT - SUBJECTIVE AND OBJECTIVE BOX
*TRANSFER FROM [facility] TO [facility]*  HOSPITAL COURSE:  78 year old male with PMH BPH, left proximal humerus fracture in December repaired in January and pleural effusion who presents from outpatient pulmonologist's office with worsening shortness of breath and enlargement of left-sided pleural effusion. ED Vitals: T 97.7F, 81 bpm, /103, RR 16, 98% on room air. Labs in the ED were significant for Na of 128. CXR showed large L sided pleural effusion. Pulmonary was consulted and placed a chest tube which drained 1.6L immediately.  Patient admitted for further management of pleural effusion. In early afternoon the day after admission, chest tube drained an additional 2.6 L; a repeat CXR afterwards was significant for a large left pneumothorax, likely 2/2 to instrumentation. Chest tube While vital signs remained stable and the patient remained comfortable on nasal cannula, a repeat CXR 2 hours later did not demonstrate full reinflation of lung, so patient was stepped up to [facility] for further management.    SUBJECTIVE: Patient seen and examined at bedside. Resting comfortably in bed, no complaints or concerns at this time. Denies SOB, CP, lightheadedness, dizziness.    OBJECTIVE:  VITAL SIGNS:  T(C): 37.1 (16 Feb 2019 17:53), Max: 37.1 (16 Feb 2019 05:13)  T(F): 98.8 (16 Feb 2019 17:53), Max: 98.8 (16 Feb 2019 17:53)  HR: 68 (16 Feb 2019 17:53) (65 - 83)  BP: 152/95 (16 Feb 2019 17:53) (111/89 - 153/96)  RR: 18 (16 Feb 2019 17:53) (16 - 18)  SpO2: 94% (16 Feb 2019 17:53) (93% - 95%)    02-15 @ 07:01  -  02-16 @ 07:00  --------------------------------------------------------  IN: 0 mL / OUT: 1550 mL / NET: -1550 mL    02-16 @ 07:01  -  02-16 @ 18:59  --------------------------------------------------------  IN: 0 mL / OUT: 275 mL / NET: -275 mL    PHYSICAL EXAM:  General: Adult White male adult in NAD  HEENT: MMM  Cardiovascular: RRR; no rub  Respiratory: trachea midline; lung sounds decreased L lung in all fields; no accessory muscle use  Gastrointestinal: protuberant; soft, NT/ND  Extremities: WWP; trace pedal edema  Neurological: AAOx3; following commands; no focal deficits  Lines/Drains/Tubes: Chest tube draining serous fluid from L chest    MEDICATIONS:  aspirin enteric coated 81 milliGRAM(s) Oral daily  heparin  Injectable 5000 Unit(s) SubCutaneous every 8 hours  tamsulosin 0.4 milliGRAM(s) Oral at bedtime    ALLERGIES:  Allergies  No Known Allergies    LABS:             12.2   5.80  )-----------( 211      ( 16 Feb 2019 08:04 )             36.2     02-16  127<L>  |  96  |  7   ----------------------------<  97  4.2   |  22  |  0.76    Ca    9.0      16 Feb 2019 08:04  Mg     1.9     02-16    TPro  6.2  /  Alb  3.3  /  TBili  0.4  /  DBili  <0.2  /  AST  22  /  ALT  21  /  AlkPhos  104  02-16    PT/INR - ( 15 Feb 2019 18:10 )   PT: 12.0 sec;   INR: 1.06      PTT - ( 15 Feb 2019 18:10 )  PTT:31.9 sec    RADIOLOGY & ADDITIONAL TESTS: Reviewed.

## 2019-02-16 NOTE — PROGRESS NOTE ADULT - PROBLEM SELECTOR PLAN 2
Hypotonic euvolemic hyponatremia, consistent with siADH 2/2 to acute lung pathology; need to exclude underlying malignancy.  - fluid restriction to 1.0 L per day  - f/u repeat BMP this afternoon to trend sodium

## 2019-02-16 NOTE — PROGRESS NOTE ADULT - SUBJECTIVE AND OBJECTIVE BOX
Interval Events:  Patient seen and examined at bedside. Drained 2.6L more from chest tube, remained at bedside, patient had cough with no chest pain. After 2.6L, began getting air in tubing - assessed tubing system and did not elicit a leak. CXR performed which showed complete resolution of the pleural effusion, but large left PTX with mediastinal shift. Concern for entrapment of air during drainage or leak in the system. Chest tube placed back to suction and air leak noted (now intermittent). Repeat CXR with slight improvement in CXR. Pt originally c/o chest tightness after drainage which has now resolved. He has some SOB which is improved when he sits up. No fever/chills. + cough since draining fluid, nonproductive. No dizziness, no nausea, no vomiting.     VSS - at the time of CXR with pneumothorax, /70, HR 71, O2sat 97%        MEDICATIONS:  Pulmonary:    Antimicrobials:    Anticoagulants:  aspirin enteric coated 81 milliGRAM(s) Oral daily  heparin  Injectable 5000 Unit(s) SubCutaneous every 8 hours    Cardiac:  tamsulosin 0.4 milliGRAM(s) Oral at bedtime      Allergies    No Known Allergies    Intolerances        Vital Signs Last 24 Hrs  T(C): 36.4 (16 Feb 2019 15:29), Max: 37.1 (16 Feb 2019 05:13)  T(F): 97.5 (16 Feb 2019 15:29), Max: 98.7 (16 Feb 2019 05:13)  HR: 71 (16 Feb 2019 15:29) (68 - 86)  BP: 146/91 (16 Feb 2019 15:29) (111/89 - 153/96)  BP(mean): --  RR: 17 (16 Feb 2019 15:29) (16 - 18)  SpO2: 95% (16 Feb 2019 15:29) (93% - 98%)    02-15 @ 07:01  -  02-16 @ 07:00  --------------------------------------------------------  IN: 0 mL / OUT: 1550 mL / NET: -1550 mL          PHYSICAL EXAM:    General: Mild discomfort, improved while at bedside, no respiratory distress  Neck: Supple; non tender; no masses  Respiratory: Decreased BS on left, CTA on R  Cardiovascular: Regular rate and rhythm. S1 and S2 Normal; No murmurs, gallops or rubs  Gastrointestinal: Soft non-tender non-distended; Normal bowel sounds  Extremities: No clubbing, cyanosis or edema  Neurological: Alert and oriented x3  Skin: Warm and dry. No obvious rash    LABS:      CBC Full  -  ( 16 Feb 2019 08:04 )  WBC Count : 5.80 K/uL  Hemoglobin : 12.2 g/dL  Hematocrit : 36.2 %  Platelet Count - Automated : 211 K/uL  Mean Cell Volume : 94.3 fl  Mean Cell Hemoglobin : 31.8 pg  Mean Cell Hemoglobin Concentration : 33.7 gm/dL    02-16    127<L>  |  96  |  7   ----------------------------<  97  4.2   |  22  |  0.76    Ca    9.0      16 Feb 2019 08:04  Mg     1.9     02-16    TPro  6.2  /  Alb  3.3  /  TBili  0.4  /  DBili  <0.2  /  AST  22  /  ALT  21  /  AlkPhos  104  02-16    PT/INR - ( 15 Feb 2019 18:10 )   PT: 12.0 sec;   INR: 1.06          PTT - ( 15 Feb 2019 18:10 )  PTT:31.9 sec                  RADIOLOGY & ADDITIONAL STUDIES (The following images were personally reviewed):

## 2019-02-16 NOTE — PROGRESS NOTE ADULT - PROBLEM SELECTOR PLAN 4
F: none   E: Will replete to K>4 and Mg>2  N: Regular diet with 1.0 L fluid restriction  Dispo: 7LA  Code Status: Full Code

## 2019-02-16 NOTE — DISCHARGE NOTE ADULT - CARE PROVIDER_API CALL
Paola Pedraza)  Critical Care Medicine; Pulmonary Disease  [address] [place] [address]  Phone: [phone]  Fax: [phone]  Follow Up Time: Paola Pedraza)  Critical Care Medicine; Pulmonary Disease  [address] [place] [address]  Phone: [phone]  Fax: [phone]  Follow Up Time:     Farhad Coronado)  Thoracic Surgery  [address] [place] [address]  Phone: [phone]  Fax: [phone]  Follow Up Time:

## 2019-02-16 NOTE — PROGRESS NOTE ADULT - PROBLEM SELECTOR PLAN 2
Hypotonic euvolemic hyponatremia, consistent with siADH 2/2 to acute lung pathology; need to exclude underlying malignancy.  - fluid restriction to 1.0 L per day  - f/u repeat BMP this evening to trend sodium

## 2019-02-16 NOTE — DISCHARGE NOTE ADULT - CARE PROVIDERS DIRECT ADDRESSES
[email or web address]

## 2019-02-16 NOTE — PROGRESS NOTE ADULT - ATTENDING COMMENTS
I spent over an hour with this patient today. Briefly he had a chest tube placed yesterday for moderately sized left pleural effusion which was noted after blunt chest wall trauma. Noted fractures were noted. A total of 1.6 L off yellowish color non-sanguinous fluid was drained. Some of the biochemical tests have come back demonstrating a transudate effusion:  -pleural fluid/blood protein ratio less than 0.5;   -pleural fluid/blood LDH ratio less than 0.6;   -glucose 78    My concern was that the fluid may prove to be bloody/resulting in hemothorax. That is not the case.  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 The cell count is still pending    It is unlikely that the left-sided pleural effusion is from unrelated causes (unrelated to chest trauma). However he have sent cytology which is pending.    This morning, we unclamped the chest tube. We performed an ultrasound and visualized a large pocket of fluid on the left side. Using a 50 cc syringe, we drained another 1 L of fluid from the chest tube. The fluid came out easily after an initial period of some air bubbles being noted. It was felt that the connections of the chest tomay have become loose and air may have leaked in from  the tubing.  Subsequently a chest x-ray on the patient was obtained. A large approximately 30% pneumothorax was visualized on the left side. The fluid had drained completely. My feeling is that the patient had air introduced into the pleural space during the process of withdrawing of pleural fluid. It is unlikely that there is a bronchial fistula since no air was seen on yesterday's chest x-ray after insertion of the chest tub.   We explained in detail to the patient and his wife what the CXR shows and what may be the possible causes    We will repeat CXR at about 5 PM tonight I spent over an hour with this patient today. Briefly he had a chest tube placed yesterday for moderately sized left pleural effusion which was noted after blunt chest wall trauma. Noted fractures were noted. A total of 1.6 L off yellowish color non-sanguinous fluid was drained. Some of the biochemical tests have come back demonstrating a transudate effusion:  -pleural fluid/blood protein ratio less than 0.5;   -pleural fluid/blood LDH ratio less than 0.6;   -glucose 78    My concern was that the fluid may prove to be bloody/resulting in hemothorax. That is not the case.  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 The cell count is still pending    It is unlikely that the left-sided pleural effusion is from unrelated causes (unrelated to chest trauma). However he have sent cytology which is pending.    This morning, we unclamped the chest tube. We performed an ultrasound and visualized a large pocket of fluid on the left side. Using a 50 cc syringe, we drained another 1 L of fluid from the chest tube. The fluid came out easily after an initial period of some air bubbles being noted. It was felt that the connections of the chest tomay have become loose and air may have leaked in from  the tubing.  Subsequently a chest x-ray on the patient was obtained. A large approximately 30% pneumothorax was visualized on the left side. The fluid had drained completely. My feeling is that the patient had air introduced into the pleural space during the process of withdrawing of pleural fluid. It is unlikely that there is a bronchial fistula since no air was seen on yesterday's chest x-ray after insertion of the chest tub.   We explained in detail to the patient and his wife what the CXR shows and what may be the possible causes    We will repeat CXR at about 5 PM tonight    Add:  I have reviewed his repeat CXR from 5 PM. It still shows a pneumothorax on the left side. As a rough estimation, the PTX is approx 30%. The chest tube is on continuous suction (-20 cm H20 pressure). In addition, there is a lucy-hilar opacity on the left side with air bronchograms through it. While it may be a pneumonia, a mass cannot be excluded on this image.  Plan:  1. Move pt to 7L  2. Monitor SpO2 on 7L  3. CT chest tonight, if stable  4. Based on CT findings, will recommend either bronchoscopy/biopsy or placement of second chest tube at the apex  5. Await results of pleural fluid cytology  6. I have made Dr. Farhad Coronado, thoracic surgeon, aware of the case

## 2019-02-16 NOTE — CHART NOTE - NSCHARTNOTEFT_GEN_A_CORE
S: Notified by pulm fellow that repeat CXR s/p chest tube manipulation significant for large left pneumothorax.    Patient examined at bedside with pulm fellow. Patient affirms increased SOB since chest tube manipulation, but otherwise feels well.     O:   Vital Signs Last 24 Hrs  T(C): 36.7 (16 Feb 2019 16:01), Max: 37.1 (16 Feb 2019 05:13)  T(F): 98 (16 Feb 2019 16:01), Max: 98.7 (16 Feb 2019 05:13)  HR: 65 (16 Feb 2019 16:01) (65 - 86)  BP: 148/96 (16 Feb 2019 16:01) (111/89 - 153/96)  BP(mean): --  RR: 18 (16 Feb 2019 16:01) (16 - 18)  SpO2: 95% (16 Feb 2019 16:01) (93% - 98%)    On exam, patient comfortable, speaking in full sentences, watching golf on television; NAD. Trachea is midline. Decreased breath sounds on left side in all lung fields; no accessory muscle use. Chest tube in situ, draining serous fluid to low intermittent suction.    A/P: Patient with new left pneumothorax, most likely 2/2 to instrumentation. Clinically stable.     Pulm fellow manipulated chest tube at bedside to remove air. Continued to monitor patient over the next hour; his subjective SOB improved gradually throughout this interval.     Pulm fellow will follow closely this afternoon/evening. Will repeat CXR at 5 PM and repeat vitals. If unstable or if pneumothorax not substantially improved, may need tele overnight.     Discussed plan with attending, who agrees with current plan of care.

## 2019-02-16 NOTE — PROGRESS NOTE ADULT - PROBLEM SELECTOR PLAN 1
- Pulmonary team consulted and chest tube placed, s/p 1.8 L total  - transudative by Light's criteria  - follow up additional pleural fluid studies still in progress: cell count with diff, pH, culture, gram stain, ADA, AFB, fungal culture  - will require repeat chest CT after more drainage; repeat CXR today to confirm chest tube placement  - would repeat echocardiogram as patient previously with pericardial effusion on prior; however, patient is currently declining inpatient echo  - f/u addl pulm recs

## 2019-02-16 NOTE — PROGRESS NOTE ADULT - PROBLEM SELECTOR PLAN 2
Hypotonic euvolemic hyponatremia, consistent with siADH 2/2 to acute lung pathology; need to exclude underlying malignancy.  - fluid restriction to 1.0 L per day  - f/u BMP

## 2019-02-17 LAB
ANION GAP SERPL CALC-SCNC: 11 MMOL/L — SIGNIFICANT CHANGE UP (ref 5–17)
ANION GAP SERPL CALC-SCNC: 9 MMOL/L — SIGNIFICANT CHANGE UP (ref 5–17)
B PERT IGG+IGM PNL SER: CLEAR — SIGNIFICANT CHANGE UP
BUN SERPL-MCNC: 6 MG/DL — LOW (ref 7–23)
BUN SERPL-MCNC: 7 MG/DL — SIGNIFICANT CHANGE UP (ref 7–23)
CALCIUM SERPL-MCNC: 8.9 MG/DL — SIGNIFICANT CHANGE UP (ref 8.4–10.5)
CALCIUM SERPL-MCNC: 9.1 MG/DL — SIGNIFICANT CHANGE UP (ref 8.4–10.5)
CHLORIDE SERPL-SCNC: 96 MMOL/L — SIGNIFICANT CHANGE UP (ref 96–108)
CHLORIDE SERPL-SCNC: 97 MMOL/L — SIGNIFICANT CHANGE UP (ref 96–108)
CO2 SERPL-SCNC: 21 MMOL/L — LOW (ref 22–31)
CO2 SERPL-SCNC: 22 MMOL/L — SIGNIFICANT CHANGE UP (ref 22–31)
COLOR FLD: YELLOW — SIGNIFICANT CHANGE UP
COMMENT - FLUIDS: SIGNIFICANT CHANGE UP
CREAT SERPL-MCNC: 0.75 MG/DL — SIGNIFICANT CHANGE UP (ref 0.5–1.3)
CREAT SERPL-MCNC: 0.78 MG/DL — SIGNIFICANT CHANGE UP (ref 0.5–1.3)
FLUID INTAKE SUBSTANCE CLASS: SIGNIFICANT CHANGE UP
FLUID SEGMENTED GRANULOCYTES: 0 % — SIGNIFICANT CHANGE UP
GLUCOSE SERPL-MCNC: 113 MG/DL — HIGH (ref 70–99)
GLUCOSE SERPL-MCNC: 92 MG/DL — SIGNIFICANT CHANGE UP (ref 70–99)
GRAM STN FLD: SIGNIFICANT CHANGE UP
HCT VFR BLD CALC: 38.8 % — LOW (ref 39–50)
HGB BLD-MCNC: 13.1 G/DL — SIGNIFICANT CHANGE UP (ref 13–17)
LYMPHOCYTES # FLD: 67 % — SIGNIFICANT CHANGE UP
MAGNESIUM SERPL-MCNC: 2 MG/DL — SIGNIFICANT CHANGE UP (ref 1.6–2.6)
MCHC RBC-ENTMCNC: 31.5 PG — SIGNIFICANT CHANGE UP (ref 27–34)
MCHC RBC-ENTMCNC: 33.8 GM/DL — SIGNIFICANT CHANGE UP (ref 32–36)
MCV RBC AUTO: 93.3 FL — SIGNIFICANT CHANGE UP (ref 80–100)
MONOS+MACROS # FLD: 33 % — SIGNIFICANT CHANGE UP
NIGHT BLUE STAIN TISS: SIGNIFICANT CHANGE UP
NRBC # BLD: 0 /100 WBCS — SIGNIFICANT CHANGE UP (ref 0–0)
PHOSPHATE SERPL-MCNC: 4.3 MG/DL — SIGNIFICANT CHANGE UP (ref 2.5–4.5)
PLATELET # BLD AUTO: 225 K/UL — SIGNIFICANT CHANGE UP (ref 150–400)
POTASSIUM SERPL-MCNC: 4.5 MMOL/L — SIGNIFICANT CHANGE UP (ref 3.5–5.3)
POTASSIUM SERPL-MCNC: 4.5 MMOL/L — SIGNIFICANT CHANGE UP (ref 3.5–5.3)
POTASSIUM SERPL-SCNC: 4.5 MMOL/L — SIGNIFICANT CHANGE UP (ref 3.5–5.3)
POTASSIUM SERPL-SCNC: 4.5 MMOL/L — SIGNIFICANT CHANGE UP (ref 3.5–5.3)
RBC # BLD: 4.16 M/UL — LOW (ref 4.2–5.8)
RBC # FLD: 14.2 % — SIGNIFICANT CHANGE UP (ref 10.3–14.5)
RCV VOL RI: 9 /UL — HIGH (ref 0–5)
SODIUM SERPL-SCNC: 128 MMOL/L — LOW (ref 135–145)
SODIUM SERPL-SCNC: 128 MMOL/L — LOW (ref 135–145)
SPECIMEN SOURCE FLD: SIGNIFICANT CHANGE UP
SPECIMEN SOURCE: SIGNIFICANT CHANGE UP
SPECIMEN SOURCE: SIGNIFICANT CHANGE UP
TOTAL NUCLEATED CELL COUNT, BODY FLUID: 9 /UL — HIGH (ref 0–5)
TUBE TYPE: SIGNIFICANT CHANGE UP
WBC # BLD: 6.38 K/UL — SIGNIFICANT CHANGE UP (ref 3.8–10.5)
WBC # FLD AUTO: 6.38 K/UL — SIGNIFICANT CHANGE UP (ref 3.8–10.5)

## 2019-02-17 PROCEDURE — 71045 X-RAY EXAM CHEST 1 VIEW: CPT | Mod: 26,77

## 2019-02-17 PROCEDURE — 71045 X-RAY EXAM CHEST 1 VIEW: CPT | Mod: 26

## 2019-02-17 PROCEDURE — 99232 SBSQ HOSP IP/OBS MODERATE 35: CPT

## 2019-02-17 PROCEDURE — 99232 SBSQ HOSP IP/OBS MODERATE 35: CPT | Mod: GC

## 2019-02-17 RX ORDER — LANOLIN ALCOHOL/MO/W.PET/CERES
3 CREAM (GRAM) TOPICAL ONCE
Qty: 0 | Refills: 0 | Status: COMPLETED | OUTPATIENT
Start: 2019-02-17 | End: 2019-02-17

## 2019-02-17 RX ADMIN — Medication 3 MILLIGRAM(S): at 22:05

## 2019-02-17 NOTE — PROGRESS NOTE ADULT - SUBJECTIVE AND OBJECTIVE BOX
Interval Events:  Patient seen and examined at bedside.    MEDICATIONS:  Pulmonary:    Antimicrobials:    Anticoagulants:  aspirin enteric coated 81 milliGRAM(s) Oral daily  heparin  Injectable 5000 Unit(s) SubCutaneous every 8 hours    Cardiac:  tamsulosin 0.4 milliGRAM(s) Oral at bedtime      Allergies    No Known Allergies    Intolerances        Vital Signs Last 24 Hrs  T(C): 36 (17 Feb 2019 14:23), Max: 37.1 (16 Feb 2019 17:53)  T(F): 96.8 (17 Feb 2019 14:23), Max: 98.8 (16 Feb 2019 17:53)  HR: 70 (17 Feb 2019 12:15) (65 - 80)  BP: 165/82 (17 Feb 2019 12:15) (120/81 - 165/82)  BP(mean): 114 (17 Feb 2019 12:15) (96 - 119)  RR: 18 (17 Feb 2019 12:15) (17 - 24)  SpO2: 99% (17 Feb 2019 12:15) (93% - 99%)    02-16 @ 07:01  -  02-17 @ 07:00  --------------------------------------------------------  IN: 0 mL / OUT: 1325 mL / NET: -1325 mL    02-17 @ 07:01  -  02-17 @ 15:09  --------------------------------------------------------  IN: 0 mL / OUT: 550 mL / NET: -550 mL          LABS:      CBC Full  -  ( 17 Feb 2019 06:47 )  WBC Count : 6.38 K/uL  Hemoglobin : 13.1 g/dL  Hematocrit : 38.8 %  Platelet Count - Automated : 225 K/uL  Mean Cell Volume : 93.3 fl  Mean Cell Hemoglobin : 31.5 pg  Mean Cell Hemoglobin Concentration : 33.8 gm/dL  Auto Neutrophil # : x  Auto Lymphocyte # : x  Auto Monocyte # : x  Auto Eosinophil # : x  Auto Basophil # : x  Auto Neutrophil % : x  Auto Lymphocyte % : x  Auto Monocyte % : x  Auto Eosinophil % : x  Auto Basophil % : x    02-17    128<L>  |  97  |  7   ----------------------------<  92  4.5   |  22  |  0.78    Ca    8.9      17 Feb 2019 12:34  Phos  4.3     02-17  Mg     2.0     02-17    TPro  6.2  /  Alb  3.3  /  TBili  0.4  /  DBili  <0.2  /  AST  22  /  ALT  21  /  AlkPhos  104  02-16    PT/INR - ( 15 Feb 2019 18:10 )   PT: 12.0 sec;   INR: 1.06          PTT - ( 15 Feb 2019 18:10 )  PTT:31.9 sec                  RADIOLOGY & ADDITIONAL STUDIES (The following images were personally reviewed): Interval Events: CT performed over night, repeat CXR this AM with interval worsening of PTX. Adjusted chest tube which may have been kinked and CXR much improved.     Patient seen and examined at bedside. SOB in the AM relieved with sitting up, improved after chest tube adjustment. Denies cough, chest pain, fever/chills.     MEDICATIONS:  Pulmonary:    Antimicrobials:    Anticoagulants:  aspirin enteric coated 81 milliGRAM(s) Oral daily  heparin  Injectable 5000 Unit(s) SubCutaneous every 8 hours    Cardiac:  tamsulosin 0.4 milliGRAM(s) Oral at bedtime      Allergies    No Known Allergies    Intolerances        Vital Signs Last 24 Hrs  T(C): 36 (17 Feb 2019 14:23), Max: 37.1 (16 Feb 2019 17:53)  T(F): 96.8 (17 Feb 2019 14:23), Max: 98.8 (16 Feb 2019 17:53)  HR: 70 (17 Feb 2019 12:15) (65 - 80)  BP: 165/82 (17 Feb 2019 12:15) (120/81 - 165/82)  BP(mean): 114 (17 Feb 2019 12:15) (96 - 119)  RR: 18 (17 Feb 2019 12:15) (17 - 24)  SpO2: 99% (17 Feb 2019 12:15) (93% - 99%)    02-16 @ 07:01  -  02-17 @ 07:00  --------------------------------------------------------  IN: 0 mL / OUT: 1325 mL / NET: -1325 mL    02-17 @ 07:01  -  02-17 @ 15:09  --------------------------------------------------------  IN: 0 mL / OUT: 550 mL / NET: -550 mL      Physical exam  General - NAD (after tube adjustment), sitting comfortably in bed on 100% NRB  HEENT - MMM, EOMI  Neck - no JVD, supple  Resp - mildly decreased BS at left base, R CTA  Heart - S1S2 RRR no M/R/G  Abs - soft, nondistended, nontender  Ext - no C/C/E; scar noted over left shoulder  Skin - WWP        LABS:      CBC Full  -  ( 17 Feb 2019 06:47 )  WBC Count : 6.38 K/uL  Hemoglobin : 13.1 g/dL  Hematocrit : 38.8 %  Platelet Count - Automated : 225 K/uL  Mean Cell Volume : 93.3 fl  Mean Cell Hemoglobin : 31.5 pg  Mean Cell Hemoglobin Concentration : 33.8 gm/dL  Auto Neutrophil # : x  Auto Lymphocyte # : x  Auto Monocyte # : x  Auto Eosinophil # : x  Auto Basophil # : x  Auto Neutrophil % : x  Auto Lymphocyte % : x  Auto Monocyte % : x  Auto Eosinophil % : x  Auto Basophil % : x    02-17    128<L>  |  97  |  7   ----------------------------<  92  4.5   |  22  |  0.78    Ca    8.9      17 Feb 2019 12:34  Phos  4.3     02-17  Mg     2.0     02-17    TPro  6.2  /  Alb  3.3  /  TBili  0.4  /  DBili  <0.2  /  AST  22  /  ALT  21  /  AlkPhos  104  02-16    PT/INR - ( 15 Feb 2019 18:10 )   PT: 12.0 sec;   INR: 1.06          PTT - ( 15 Feb 2019 18:10 )  PTT:31.9 sec                  RADIOLOGY & ADDITIONAL STUDIES (The following images were personally reviewed):

## 2019-02-17 NOTE — PROGRESS NOTE ADULT - PROBLEM SELECTOR PLAN 1
Pt with L sided pleural effusion thought to be reactive s/p fall injury in December prior to presentation. Pulmonary team consulted and chest tube placed, s/p 1.8 L on initial drainage, then 2.6L drained 2/16. Repeat imaging showed moderate-large left sided pneumothorax. Transudative effusion by Light's criteria.   -CT surgery consulted, pneumothorax drained but pt's left lung not yet re-inflated  -follow up additional pleural fluid studies still in progress: cell count with diff, pH, culture, gram stain, ADA, AFB neg, fungal culture  -will require repeat chest CT after more drainage, tube must remain to constant suction while in transport and in imaging  - repeat ECHO  - f/u addl pulm recs    #Pneumothorax  As above. 2/17 CXR in AM showed worsening of pneumothorax, therefore Pulm team came and rearranged chest tube, with f/u CXR much improved.   -Plan continue with 100% NRB through the day with chest tube to suction.

## 2019-02-17 NOTE — PROGRESS NOTE ADULT - PROBLEM SELECTOR PLAN 2
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  - fluid restriction to 1.0 L per day   - f/u BMPqd and monitor for signs of confusion/seizure/

## 2019-02-17 NOTE — PROGRESS NOTE ADULT - PROBLEM SELECTOR PLAN 1
- present since fall 2 months ago  - Effusion consistent with transudative, may be post inflammatory 2/2 fall. Awaiting cell count w/ diff  - Will need to F/U cytology  - Chest CT with no evidence of lung mass or endobronchial lesion; persistent hydropneumothorax with re-expansion pulmonary edema and possible bleb of LLL visualized.

## 2019-02-17 NOTE — PROGRESS NOTE ADULT - SUBJECTIVE AND OBJECTIVE BOX
OVERNIGHT EVENTS: Na improved to 130 on 10pm BMP. CT chest completed, shows persistent hydro-pneumothorax. Put on 100% NRB overnight for PTX.     SUBJECTIVE / INTERVAL HPI: Patient seen and examined at bedside. Patient endorses breathing comfortably denies any current SOB and saturating well on NRB. CXR in AM showed worsening of pneumothorax, therefore Pulm team came and rearranged chest tube, with f/u CXR much improved. Plan for patient to continue with NRB through the day with chest tube to suction.     VITAL SIGNS:  Vital Signs Last 24 Hrs  T(C): 36.2 (17 Feb 2019 10:17), Max: 37.1 (16 Feb 2019 17:53)  T(F): 97.1 (17 Feb 2019 10:17), Max: 98.8 (16 Feb 2019 17:53)  HR: 80 (17 Feb 2019 08:20) (65 - 80)  BP: 120/81 (17 Feb 2019 08:20) (120/81 - 154/96)  BP(mean): 96 (17 Feb 2019 08:20) (96 - 119)  RR: 19 (17 Feb 2019 08:20) (17 - 24)  SpO2: 97% (17 Feb 2019 08:20) (93% - 97%)    PHYSICAL EXAM:  General:  male in NAD   HEENT: Nontraumatic, PEERL, MMM  Cardiovascular: S1 S1 RRR no murmurs; no rub  Respiratory: trachea midline; CTA R, L lung with decreased sounds at base w/ scattered crackles; L sided chest tube in place draining serous fluid, nontender c/d/i set to suction   Gastrointestinal: soft, NT/ND BS+   Extremities: WWP; trace pedal edema b/l   Neurological: AAOx3;  no focal deficits    MEDICATIONS:  MEDICATIONS  (STANDING):  aspirin enteric coated 81 milliGRAM(s) Oral daily  heparin  Injectable 5000 Unit(s) SubCutaneous every 8 hours  tamsulosin 0.4 milliGRAM(s) Oral at bedtime    MEDICATIONS  (PRN):      ALLERGIES:  Allergies    No Known Allergies    Intolerances        LABS:                        13.1   6.38  )-----------( 225      ( 17 Feb 2019 06:47 )             38.8     02-17    128<L>  |  97  |  7   ----------------------------<  92  4.5   |  22  |  0.78    Ca    8.9      17 Feb 2019 12:34  Phos  4.3     02-17  Mg     2.0     02-17    TPro  6.2  /  Alb  3.3  /  TBili  0.4  /  DBili  <0.2  /  AST  22  /  ALT  21  /  AlkPhos  104  02-16    PT/INR - ( 15 Feb 2019 18:10 )   PT: 12.0 sec;   INR: 1.06          PTT - ( 15 Feb 2019 18:10 )  PTT:31.9 sec    CAPILLARY BLOOD GLUCOSE          RADIOLOGY & ADDITIONAL TESTS: Reviewed.

## 2019-02-17 NOTE — PROGRESS NOTE ADULT - ATTENDING COMMENTS
I concur with the physical exam. The patient says he is feeling much better. His moderately sized left pleural effusion has completely darined. A total of approx 2.0L of yellowish color non-sanguinous fluid was drained. Some of the biochemical tests have come back demonstrating a transudate effusion. The cytology and celldifferential is still pending.    My concern was that the fluid may prove to be bloody/resulting in hemothorax. That is not the case.  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 The cytology and cell count is likely to shed light on its etiology    Yesterday, an approximately 35% pneumothorax was visualized on the left side.  My feeling is that the patient had air introduced into the pleural space during the process of withdrawing of pleural fluid. It is unlikely that there is a bronchial fistula since no air was seen on yesterday's chest x-ray after insertion of the chest tub.   A Ct scan was performed. It shows a small sized hydropneumothorax. At the left base, there may be a bulla. There is evidence of a curvilinear ground glass opacity that is reminiscent of reexpansion pulmonary edema in the left lung. No mass or nodule is noted in the left lung.    Plan:  1. Observe pt in 7L. SpO2 is 98% on low flow O2  2. Continue to monitor SpO2   3. Ambulate, with suction if possible  4. Chest tube to suction till tomorrow AM  5. CXR in AM  6. Await results of pleural fluid cytology  7. Appreciate Dr. Farhad Coronado's input. I concur with the physical exam. The patient says he is feeling much better. His moderately sized left pleural effusion has completely darined. A total of approx 4.0L of yellowish color non-sanguinous fluid was drained. Some of the biochemical tests have come back demonstrating a transudate effusion. The cytology and celldifferential is still pending.    My concern was that the fluid may prove to be bloody/resulting in hemothorax. That is not the case.  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 The cytology and cell count is likely to shed light on its etiology    Yesterday, an approximately 35% pneumothorax was visualized on the left side.  My feeling is that the patient had air introduced into the pleural space during the process of withdrawing of pleural fluid. It is unlikely that there is a bronchial fistula since no air was seen on yesterday's chest x-ray after insertion of the chest tub.   A Ct scan was performed. It shows a small sized hydropneumothorax. At the left base, there may be a bulla. There is evidence of a curvilinear ground glass opacity that is reminiscent of reexpansion pulmonary edema in the left lung. No mass or nodule is noted in the left lung.    Plan:  1. Observe pt in 7L. SpO2 is 98% on low flow O2  2. Continue to monitor SpO2   3. Ambulate, with suction if possible  4. Chest tube to suction till tomorrow AM  5. CXR in AM  6. Await results of pleural fluid cytology  7. Appreciate Dr. Farhad Coronado's input.

## 2019-02-18 LAB
ANION GAP SERPL CALC-SCNC: 9 MMOL/L — SIGNIFICANT CHANGE UP (ref 5–17)
BUN SERPL-MCNC: 9 MG/DL — SIGNIFICANT CHANGE UP (ref 7–23)
CALCIUM SERPL-MCNC: 9 MG/DL — SIGNIFICANT CHANGE UP (ref 8.4–10.5)
CHLORIDE SERPL-SCNC: 99 MMOL/L — SIGNIFICANT CHANGE UP (ref 96–108)
CO2 SERPL-SCNC: 22 MMOL/L — SIGNIFICANT CHANGE UP (ref 22–31)
CREAT SERPL-MCNC: 0.77 MG/DL — SIGNIFICANT CHANGE UP (ref 0.5–1.3)
GLUCOSE SERPL-MCNC: 95 MG/DL — SIGNIFICANT CHANGE UP (ref 70–99)
HCT VFR BLD CALC: 39.4 % — SIGNIFICANT CHANGE UP (ref 39–50)
HGB BLD-MCNC: 13.2 G/DL — SIGNIFICANT CHANGE UP (ref 13–17)
MCHC RBC-ENTMCNC: 31.9 PG — SIGNIFICANT CHANGE UP (ref 27–34)
MCHC RBC-ENTMCNC: 33.5 GM/DL — SIGNIFICANT CHANGE UP (ref 32–36)
MCV RBC AUTO: 95.2 FL — SIGNIFICANT CHANGE UP (ref 80–100)
NRBC # BLD: 0 /100 WBCS — SIGNIFICANT CHANGE UP (ref 0–0)
PLATELET # BLD AUTO: 218 K/UL — SIGNIFICANT CHANGE UP (ref 150–400)
POTASSIUM SERPL-MCNC: 4.3 MMOL/L — SIGNIFICANT CHANGE UP (ref 3.5–5.3)
POTASSIUM SERPL-SCNC: 4.3 MMOL/L — SIGNIFICANT CHANGE UP (ref 3.5–5.3)
RBC # BLD: 4.14 M/UL — LOW (ref 4.2–5.8)
RBC # FLD: 14 % — SIGNIFICANT CHANGE UP (ref 10.3–14.5)
SODIUM SERPL-SCNC: 130 MMOL/L — LOW (ref 135–145)
WBC # BLD: 7.08 K/UL — SIGNIFICANT CHANGE UP (ref 3.8–10.5)
WBC # FLD AUTO: 7.08 K/UL — SIGNIFICANT CHANGE UP (ref 3.8–10.5)

## 2019-02-18 PROCEDURE — 71045 X-RAY EXAM CHEST 1 VIEW: CPT | Mod: 26,76

## 2019-02-18 PROCEDURE — 99232 SBSQ HOSP IP/OBS MODERATE 35: CPT

## 2019-02-18 PROCEDURE — 99232 SBSQ HOSP IP/OBS MODERATE 35: CPT | Mod: GC

## 2019-02-18 RX ORDER — LANOLIN ALCOHOL/MO/W.PET/CERES
3 CREAM (GRAM) TOPICAL AT BEDTIME
Qty: 0 | Refills: 0 | Status: DISCONTINUED | OUTPATIENT
Start: 2019-02-18 | End: 2019-02-20

## 2019-02-18 RX ORDER — SENNA PLUS 8.6 MG/1
2 TABLET ORAL ONCE
Qty: 0 | Refills: 0 | Status: COMPLETED | OUTPATIENT
Start: 2019-02-18 | End: 2019-02-18

## 2019-02-18 RX ORDER — DOCUSATE SODIUM 100 MG
100 CAPSULE ORAL EVERY 12 HOURS
Qty: 0 | Refills: 0 | Status: DISCONTINUED | OUTPATIENT
Start: 2019-02-18 | End: 2019-02-20

## 2019-02-18 RX ORDER — SODIUM CHLORIDE 9 MG/ML
2 INJECTION INTRAMUSCULAR; INTRAVENOUS; SUBCUTANEOUS EVERY 8 HOURS
Qty: 0 | Refills: 0 | Status: DISCONTINUED | OUTPATIENT
Start: 2019-02-18 | End: 2019-02-20

## 2019-02-18 RX ADMIN — SENNA PLUS 2 TABLET(S): 8.6 TABLET ORAL at 11:10

## 2019-02-18 RX ADMIN — SODIUM CHLORIDE 2 GRAM(S): 9 INJECTION INTRAMUSCULAR; INTRAVENOUS; SUBCUTANEOUS at 21:37

## 2019-02-18 RX ADMIN — Medication 3 MILLIGRAM(S): at 23:03

## 2019-02-18 RX ADMIN — SODIUM CHLORIDE 2 GRAM(S): 9 INJECTION INTRAMUSCULAR; INTRAVENOUS; SUBCUTANEOUS at 11:11

## 2019-02-18 RX ADMIN — Medication 100 MILLIGRAM(S): at 18:39

## 2019-02-18 RX ADMIN — Medication 100 MILLIGRAM(S): at 11:10

## 2019-02-18 NOTE — CHART NOTE - NSCHARTNOTEFT_GEN_A_CORE
Case discussed with Dr. Coronado.  No obvious pneumothorax on AM CXR, planned for water seal trial per pulm team today.  No immediate surgical intervention indicated at this time.  Will sign off, please re-consult as needed.

## 2019-02-18 NOTE — PROGRESS NOTE ADULT - SUBJECTIVE AND OBJECTIVE BOX
Interval Events: Repeat CXR this AM with small residual pneumothorax, deep sulcus sign on L, but significant improvement    Patient seen and examined at bedside. Feeling much better, ambulating without difficulty.         MEDICATIONS:  Pulmonary:    Antimicrobials:    Anticoagulants:  aspirin enteric coated 81 milliGRAM(s) Oral daily  heparin  Injectable 5000 Unit(s) SubCutaneous every 8 hours    Cardiac:  tamsulosin 0.4 milliGRAM(s) Oral at bedtime      Allergies    No Known Allergies    Intolerances        Vital Signs Last 24 Hrs  T(C): 36.4 (18 Feb 2019 13:25), Max: 37 (18 Feb 2019 06:00)  T(F): 97.6 (18 Feb 2019 13:25), Max: 98.6 (18 Feb 2019 06:00)  HR: 78 (18 Feb 2019 08:10) (62 - 78)  BP: 108/64 (18 Feb 2019 08:10) (108/64 - 138/89)  BP(mean): 83 (18 Feb 2019 08:10) (83 - 109)  RR: 18 (18 Feb 2019 08:10) (18 - 22)  SpO2: 93% (18 Feb 2019 08:10) (93% - 100%)    02-17 @ 07:01  -  02-18 @ 07:00  --------------------------------------------------------  IN: 0 mL / OUT: 1100 mL / NET: -1100 mL          PHYSICAL EXAM:    General: Well developed; well nourished; in no acute distress  Respiratory: Mildly decreased BS on left, no W/R/R  Cardiovascular: Regular rate and rhythm. S1 and S2 Normal; No murmurs, gallops or rubs  Gastrointestinal: Soft non-tender non-distended; Normal bowel sounds  Extremities: 1+ pitting edema B/L no clubbing no cyanosis  Neurological: Alert and oriented x3  Skin: Warm and dry. No obvious rash    LABS:      CBC Full  -  ( 18 Feb 2019 07:21 )  WBC Count : 7.08 K/uL  Hemoglobin : 13.2 g/dL  Hematocrit : 39.4 %  Platelet Count - Automated : 218 K/uL  Mean Cell Volume : 95.2 fl  Mean Cell Hemoglobin : 31.9 pg  Mean Cell Hemoglobin Concentration : 33.5 gm/dL      02-18    130<L>  |  99  |  9   ----------------------------<  95  4.3   |  22  |  0.77    Ca    9.0      18 Feb 2019 07:20  Phos  4.3     02-17  Mg     2.0     02-17        Cell Count, Body Fluid (02.15.19 @ 21:15)    Monocyte/Macrophage Count - Body Fluid: 33 %    Fluid Segmented Granulocytes: 0 %    Fluid Source: Pleural    Fluid Type: Pleural    Comment - Fluids: Differential is based on 9 cells counted after cytocentrifugation.    Body Fluid Appearance: Clear    BF Lymphocytes: 67 %    Tube Type: Sterile    Color - Body Fluid: Yellow    Total Nucleated Cell Count, Body Fluid: 9 /uL    Total RBC Count: 9 /uL                    RADIOLOGY & ADDITIONAL STUDIES (The following images were personally reviewed):

## 2019-02-18 NOTE — PROGRESS NOTE ADULT - PROBLEM SELECTOR PLAN 2
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  - fluid restriction to 1.0 L per day   - adding Salt Tabs 2g Q8  - monitor BMP

## 2019-02-18 NOTE — PROGRESS NOTE ADULT - PROBLEM SELECTOR PLAN 4
F: none   E: Will replete to K>4 and Mg>2  N: Regular diet with 1.0 L fluid restriction  Dispo: 7LA  Code Status: Full Code F: none   E: Will replete to K>4 and Mg>2  N: Regular diet with 1.0 L fluid restriction, salt tabs 2g Q8  Dispo: 7LA  Code Status: Full Code

## 2019-02-18 NOTE — PROGRESS NOTE ADULT - PROBLEM SELECTOR PLAN 1
Pt with L sided pleural effusion thought to be reactive s/p fall injury in December prior to presentation. Pulmonary team consulted and chest tube placed, s/p 1.8 L on initial drainage, then 2.6L drained 2/16. Repeat imaging showed moderate-large left sided pneumothorax. Transudative effusion by Light's criteria.   -CT surgery following, appreciate recs  -follow up additional pleural fluid studies still in progress: cell count with diff, pH, culture, gram stain, ADA, AFB neg, fungal culture  - ECHO  - f/u addl pulm recs  - Effusion and pneumothorax appear resolved on CXR this AM    #Pneumothorax  As above. 2/17 CXR in AM showed worsening of pneumothorax, therefore Pulm team came and rearranged chest tube, with f/u CXR much improved.   -Plan continue with 100% NRB through the day with chest tube to suction  -Pneumothorax appears resolved on CXR this AM Pt with L sided pleural effusion thought to be reactive s/p fall injury in December prior to presentation. Pulmonary team consulted and chest tube placed, s/p 1.8 L on initial drainage, then 2.6L drained 2/16. Repeat imaging showed moderate-large left sided pneumothorax. Transudative effusion by Light's criteria.   -CT surgery following, appreciate recs  -follow up additional pleural fluid studies still in progress: cell count with diff, pH, culture, gram stain, ADA, AFB neg, fungal culture  - ECHO  - Effusion and pneumothorax appear resolved on CXR this AM, will repeat CXR later today and likely plan for tube removal and d/c tomorrow    #Pneumothorax  As above. 2/17 CXR in AM showed worsening of pneumothorax, therefore Pulm team came and rearranged chest tube, with f/u CXR much improved.   -Plan continue with 100% NRB through the day with chest tube to suction  -Pneumothorax appears resolved on CXR this AM

## 2019-02-18 NOTE — PROGRESS NOTE ADULT - SUBJECTIVE AND OBJECTIVE BOX
INTERVAL HPI/OVERNIGHT EVENTS: BHUMIKA, chest tube to suction with minimal output overnight.    SUBJECTIVE: Patient seen and examined at bedside. Resting comfortably in bed    OBJECTIVE:  VITAL SIGNS:  T(C): 37 (18 Feb 2019 06:00), Max: 37 (18 Feb 2019 06:00)  T(F): 98.6 (18 Feb 2019 06:00), Max: 98.6 (18 Feb 2019 06:00)  HR: 62 (18 Feb 2019 04:35) (62 - 80)  BP: 132/87 (18 Feb 2019 04:35) (120/81 - 165/82)  BP(mean): 105 (18 Feb 2019 04:35) (95 - 114)  RR: 18 (18 Feb 2019 04:35) (18 - 22)  SpO2: 99% (18 Feb 2019 04:35) (97% - 100%)    02-17 @ 07:01  -  02-18 @ 07:00  --------------------------------------------------------  IN: 0 mL / OUT: 1100 mL / NET: -1100 mL    PHYSICAL EXAM:      MEDICATIONS:  aspirin enteric coated 81 milliGRAM(s) Oral daily  heparin  Injectable 5000 Unit(s) SubCutaneous every 8 hours  tamsulosin 0.4 milliGRAM(s) Oral at bedtime    ALLERGIES:  Allergies    No Known Allergies    Intolerances        LABS:                        13.1   6.38  )-----------( 225      ( 17 Feb 2019 06:47 )             38.8     02-17    128<L>  |  97  |  7   ----------------------------<  92  4.5   |  22  |  0.78    Ca    8.9      17 Feb 2019 12:34  Phos  4.3     02-17  Mg     2.0     02-17    TPro  6.2  /  Alb  3.3  /  TBili  0.4  /  DBili  <0.2  /  AST  22  /  ALT  21  /  AlkPhos  104  02-16          RADIOLOGY & ADDITIONAL TESTS: Reviewed. INTERVAL HPI/OVERNIGHT EVENTS: BHUMIKA, chest tube to suction with minimal output overnight.    SUBJECTIVE: Patient seen and examined at bedside. Resting comfortably in bed, denies CP, cough, SOB.    OBJECTIVE:  VITAL SIGNS:  T(C): 37 (18 Feb 2019 06:00), Max: 37 (18 Feb 2019 06:00)  T(F): 98.6 (18 Feb 2019 06:00), Max: 98.6 (18 Feb 2019 06:00)  HR: 62 (18 Feb 2019 04:35) (62 - 80)  BP: 132/87 (18 Feb 2019 04:35) (120/81 - 165/82)  BP(mean): 105 (18 Feb 2019 04:35) (95 - 114)  RR: 18 (18 Feb 2019 04:35) (18 - 22)  SpO2: 99% (18 Feb 2019 04:35) (97% - 100%)    02-17 @ 07:01  -  02-18 @ 07:00  --------------------------------------------------------  IN: 0 mL / OUT: 1100 mL / NET: -1100 mL    PHYSICAL EXAM:  General:  male in NAD   HEENT: Nontraumatic, PEERL, MMM  Cardiovascular: S1 S1 RRR no murmurs; no rub  Respiratory: trachea midline; CTA R, L lung with decreased sounds at base w/ scattered crackles; L sided chest tube in place draining serous fluid, nontender c/d/i set to suction   Gastrointestinal: soft, NT/ND BS+   Extremities: WWP; trace pedal edema b/l   Neurological: AAOx3;  no focal deficits    MEDICATIONS:  aspirin enteric coated 81 milliGRAM(s) Oral daily  heparin  Injectable 5000 Unit(s) SubCutaneous every 8 hours  tamsulosin 0.4 milliGRAM(s) Oral at bedtime    ALLERGIES:  Allergies  No Known Allergies    LABS:             13.1   6.38  )-----------( 225      ( 17 Feb 2019 06:47 )             38.8     02-17  128<L>  |  97  |  7   ----------------------------<  92  4.5   |  22  |  0.78    Ca    8.9      17 Feb 2019 12:34  Phos  4.3     02-17  Mg     2.0     02-17    TPro  6.2  /  Alb  3.3  /  TBili  0.4  /  DBili  <0.2  /  AST  22  /  ALT  21  /  AlkPhos  104  02-16    RADIOLOGY & ADDITIONAL TESTS: Reviewed. INTERVAL HPI/OVERNIGHT EVENTS: BHUMIKA, chest tube to suction with minimal output overnight.    SUBJECTIVE: Patient seen and examined at bedside. Resting comfortably in bed, denies CP, cough, SOB.    OBJECTIVE:  VITAL SIGNS:  T(C): 37 (18 Feb 2019 06:00), Max: 37 (18 Feb 2019 06:00)  T(F): 98.6 (18 Feb 2019 06:00), Max: 98.6 (18 Feb 2019 06:00)  HR: 62 (18 Feb 2019 04:35) (62 - 80)  BP: 132/87 (18 Feb 2019 04:35) (120/81 - 165/82)  BP(mean): 105 (18 Feb 2019 04:35) (95 - 114)  RR: 18 (18 Feb 2019 04:35) (18 - 22)  SpO2: 99% (18 Feb 2019 04:35) (97% - 100%)    02-17 @ 07:01  -  02-18 @ 07:00  --------------------------------------------------------  IN: 0 mL / OUT: 1100 mL / NET: -1100 mL    PHYSICAL EXAM:  General:  male in NAD   HEENT: Nontraumatic, PEERL, MMM  Cardiovascular: S1 S1 RRR no murmurs; no rub  Respiratory: trachea midline; CTA R, L lung with decreased sounds at base w/ scattered crackles; L sided chest tube in place draining serous fluid, nontender c/d/i set to suction   Gastrointestinal: soft, NT/ND BS+   Extremities: WWP; trace pedal edema b/l   Neurological: AAOx3;  no focal deficits  Skin: no rash    MEDICATIONS:  aspirin enteric coated 81 milliGRAM(s) Oral daily  heparin  Injectable 5000 Unit(s) SubCutaneous every 8 hours  tamsulosin 0.4 milliGRAM(s) Oral at bedtime    ALLERGIES:  Allergies  No Known Allergies    LABS:             13.1   6.38  )-----------( 225      ( 17 Feb 2019 06:47 )             38.8     02-17  128<L>  |  97  |  7   ----------------------------<  92  4.5   |  22  |  0.78    Ca    8.9      17 Feb 2019 12:34  Phos  4.3     02-17  Mg     2.0     02-17    TPro  6.2  /  Alb  3.3  /  TBili  0.4  /  DBili  <0.2  /  AST  22  /  ALT  21  /  AlkPhos  104  02-16    RADIOLOGY & ADDITIONAL TESTS: Reviewed.

## 2019-02-18 NOTE — PROGRESS NOTE ADULT - PROBLEM SELECTOR PLAN 1
- present since fall 2 months ago  - Effusion consistent with transudative, may be post inflammatory 2/2 fall which is further supported by lymphocytosis on cell count  - Will need to F/U cytology  - Chest CT with no evidence of lung mass or endobronchial lesion; persistent hydropneumothorax with re-expansion pulmonary edema and possible bleb of LLL visualized.

## 2019-02-18 NOTE — PROGRESS NOTE ADULT - ATTENDING COMMENTS
I concur with the physical exam. The patient says he is feeling much better. He is sitting OOB to chair. His moderately sized left pleural effusion has completely darined. A total of approx 4.0L of yellowish color non-sanguinous fluid was drained. Some of the biochemical tests have come back demonstrating a transudate effusion. The cytology is pending; cell differential demonstrated lymphocytic predominance. The diagnosis of malignancy needs to be excluded with cytology. If cytology is negative, will consider non-specific chronic pleuritis which is a described entity after blunt injury to the chest wall.     My concern was that the fluid may prove to be bloody/resulting in hemothorax. That is not the case.  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 The cytology and cell count is likely to shed light on its etiology    A Ct scan was performed. It shows a small sized hydropneumothorax. At the left base, there may be a bulla. There is evidence of a curvilinear ground glass opacity that is reminiscent of reexpansion pulmonary edema in the left lung. No mass or nodule is noted in the left lung.  CXR today showed very significant resolution of PTX. There is a very small amount of air visualized in the apex of the left lung, laterally in the mid chest and at the left base.     Plan:  1. Observe pt in 7L. SpO2 is 98% on low flow O2  2. Continue to monitor SpO2   3. Ambulate, without suction, monitoring SpO2  4. Chest tube off suction till tomorrow AM  5. CXR in tonight and tomorrow am  6. Await results of pleural fluid cytology  7.If all is well, we will consider pulling his chest tube tomorrow AM>

## 2019-02-19 LAB
ANION GAP SERPL CALC-SCNC: 11 MMOL/L — SIGNIFICANT CHANGE UP (ref 5–17)
BUN SERPL-MCNC: 8 MG/DL — SIGNIFICANT CHANGE UP (ref 7–23)
CALCIUM SERPL-MCNC: 9.4 MG/DL — SIGNIFICANT CHANGE UP (ref 8.4–10.5)
CHLORIDE SERPL-SCNC: 99 MMOL/L — SIGNIFICANT CHANGE UP (ref 96–108)
CO2 SERPL-SCNC: 22 MMOL/L — SIGNIFICANT CHANGE UP (ref 22–31)
CREAT SERPL-MCNC: 0.77 MG/DL — SIGNIFICANT CHANGE UP (ref 0.5–1.3)
CULTURE RESULTS: NO GROWTH — SIGNIFICANT CHANGE UP
GLUCOSE SERPL-MCNC: 105 MG/DL — HIGH (ref 70–99)
HCT VFR BLD CALC: 40.6 % — SIGNIFICANT CHANGE UP (ref 39–50)
HGB BLD-MCNC: 13.8 G/DL — SIGNIFICANT CHANGE UP (ref 13–17)
MAGNESIUM SERPL-MCNC: 1.9 MG/DL — SIGNIFICANT CHANGE UP (ref 1.6–2.6)
MCHC RBC-ENTMCNC: 32.2 PG — SIGNIFICANT CHANGE UP (ref 27–34)
MCHC RBC-ENTMCNC: 34 GM/DL — SIGNIFICANT CHANGE UP (ref 32–36)
MCV RBC AUTO: 94.9 FL — SIGNIFICANT CHANGE UP (ref 80–100)
NRBC # BLD: 0 /100 WBCS — SIGNIFICANT CHANGE UP (ref 0–0)
PLATELET # BLD AUTO: 242 K/UL — SIGNIFICANT CHANGE UP (ref 150–400)
POTASSIUM SERPL-MCNC: 4.2 MMOL/L — SIGNIFICANT CHANGE UP (ref 3.5–5.3)
POTASSIUM SERPL-SCNC: 4.2 MMOL/L — SIGNIFICANT CHANGE UP (ref 3.5–5.3)
RBC # BLD: 4.28 M/UL — SIGNIFICANT CHANGE UP (ref 4.2–5.8)
RBC # FLD: 13.8 % — SIGNIFICANT CHANGE UP (ref 10.3–14.5)
SODIUM SERPL-SCNC: 132 MMOL/L — LOW (ref 135–145)
SPECIMEN SOURCE: SIGNIFICANT CHANGE UP
WBC # BLD: 7 K/UL — SIGNIFICANT CHANGE UP (ref 3.8–10.5)
WBC # FLD AUTO: 7 K/UL — SIGNIFICANT CHANGE UP (ref 3.8–10.5)

## 2019-02-19 PROCEDURE — 99232 SBSQ HOSP IP/OBS MODERATE 35: CPT

## 2019-02-19 PROCEDURE — 99233 SBSQ HOSP IP/OBS HIGH 50: CPT | Mod: GC

## 2019-02-19 PROCEDURE — 71045 X-RAY EXAM CHEST 1 VIEW: CPT | Mod: 26

## 2019-02-19 PROCEDURE — 71045 X-RAY EXAM CHEST 1 VIEW: CPT | Mod: 26,77

## 2019-02-19 PROCEDURE — 93306 TTE W/DOPPLER COMPLETE: CPT | Mod: 26

## 2019-02-19 RX ADMIN — SODIUM CHLORIDE 2 GRAM(S): 9 INJECTION INTRAMUSCULAR; INTRAVENOUS; SUBCUTANEOUS at 13:08

## 2019-02-19 RX ADMIN — Medication 3 MILLIGRAM(S): at 22:24

## 2019-02-19 RX ADMIN — SODIUM CHLORIDE 2 GRAM(S): 9 INJECTION INTRAMUSCULAR; INTRAVENOUS; SUBCUTANEOUS at 06:13

## 2019-02-19 RX ADMIN — SODIUM CHLORIDE 2 GRAM(S): 9 INJECTION INTRAMUSCULAR; INTRAVENOUS; SUBCUTANEOUS at 22:24

## 2019-02-19 RX ADMIN — Medication 100 MILLIGRAM(S): at 06:13

## 2019-02-19 NOTE — PROGRESS NOTE ADULT - SUBJECTIVE AND OBJECTIVE BOX
Interval Events: Chest tube placed to suction overnight given recurrent pneumothorax after repeat CXR yesterday    Patient seen and examined at bedside. Continues to feel well, no SOB, no chest pain. Ambulating with chest tube in place.         MEDICATIONS:  Pulmonary:    Antimicrobials:    Anticoagulants:  aspirin enteric coated 81 milliGRAM(s) Oral daily  heparin  Injectable 5000 Unit(s) SubCutaneous every 8 hours    Cardiac:  tamsulosin 0.4 milliGRAM(s) Oral at bedtime      Allergies    No Known Allergies    Intolerances        Vital Signs Last 24 Hrs  T(C): 36.9 (19 Feb 2019 04:58), Max: 37.2 (19 Feb 2019 01:33)  T(F): 98.5 (19 Feb 2019 04:58), Max: 99 (19 Feb 2019 01:33)  HR: 80 (19 Feb 2019 08:26) (68 - 86)  BP: 124/71 (19 Feb 2019 08:26) (108/68 - 137/79)  BP(mean): 91 (19 Feb 2019 08:26) (83 - 109)  RR: 18 (19 Feb 2019 08:26) (18 - 27)  SpO2: 87% (19 Feb 2019 08:26) (87% - 97%)    02-18 @ 07:01  -  02-19 @ 07:00  --------------------------------------------------------  IN: 200 mL / OUT: 1790 mL / NET: -1590 mL          PHYSICAL EXAM:    General: Well developed; well nourished; in no acute distress  Neck: Supple; non tender; no masses  Respiratory: Clear to auscultation bilaterally without wheezing, rhonchi or rales  Cardiovascular: Regular rate and rhythm. S1 and S2 Normal; No murmurs, gallops or rubs  Gastrointestinal: Soft non-tender non-distended; Normal bowel sounds  Extremities:  No clubbing, cyanosis or edema  Neurological: Alert and oriented x3  Skin: Warm and dry. No obvious rash    LABS:      CBC Full  -  ( 19 Feb 2019 06:57 )  WBC Count : 7.00 K/uL  Hemoglobin : 13.8 g/dL  Hematocrit : 40.6 %  Platelet Count - Automated : 242 K/uL  Mean Cell Volume : 94.9 fl  Mean Cell Hemoglobin : 32.2 pg  Mean Cell Hemoglobin Concentration : 34.0 gm/dL      02-19    132<L>  |  99  |  8   ----------------------------<  105<H>  4.2   |  22  |  0.77    Ca    9.4      19 Feb 2019 06:57  Mg     1.9     02-19                        RADIOLOGY & ADDITIONAL STUDIES (The following images were personally reviewed):

## 2019-02-19 NOTE — PHYSICAL THERAPY INITIAL EVALUATION ADULT - DISCHARGE DISPOSITION, PT EVAL
DISCHARGE INPATIENT PT secondary to patient independent and at prior level of function/no skilled PT needs/home

## 2019-02-19 NOTE — PROGRESS NOTE ADULT - ATTENDING COMMENTS
I concur with the physical exam. The patient says he is feeling much better. He is sitting OOB to chair. His moderately sized left pleural effusion has completely darined. A total of approx 4.0L of yellowish color non-sanguinous fluid was drained. Some of the biochemical tests have come back demonstrating a transudate effusion. The cytology is pending; cell differential demonstrated lymphocytic predominance. The diagnosis of malignancy needs to be excluded with cytology. If cytology is negative, will consider non-specific chronic pleuritis which is a described entity after blunt injury to the chest wall.  Lymphocytic sometimes exudative effusion due to pleural reaction and thickening resulting in chronic pleuritis.     A CT scan was performed. It shows a small sized hydropneumothorax. At the left base, there may be a bulla. There is evidence of a curvilinear ground glass opacity that is reminiscent of reexpansion pulmonary edema in the left lung. No mass or nodule is noted in the left lung. Also, there is a small area of pleural calcification which may signify exposure to asbestos.   CXR today showed very significant resolution of PTX. There is a very small amount of air visualized in the apex of the left lung, laterally in the mid chest and at the left base.     Plan:  1. Observe pt in 7L. SpO2 is 98% on low flow O2  2. Continue to monitor SpO2   3. Ambulate, without suction, monitoring SpO2  4. Chest tube off suction   5. CXR in tonight and tomorrow am  6. Await results of pleural fluid cytology  7.If all is well, we will consider pulling his chest tube tomorrow AM.  8. Annual F/U PFTs, 6MWT and CT chest to look for progression of ?asbestos related pleural plaques  9. 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

## 2019-02-19 NOTE — PROGRESS NOTE ADULT - PROBLEM SELECTOR PLAN 2
- originally 2/2 pleural effusion, complicated by pneumothorax   - Now with improvement in pneumothorax, SOB has improved

## 2019-02-19 NOTE — CONSULT NOTE ADULT - ASSESSMENT
78YOM with PMH BPH, left proximal humerus fracture s/p repair (1/2019) and transudative pleural effusion now s/p chest tube c/b pneumothorax, stepped up to [facility] for further management currently set to constant suction and on NRB.    Problem/Plan - 1:  ·  Problem: Pleural effusion.  Plan: Pt with L sided pleural effusion thought to be reactive s/p fall injury in December prior to presentation. Pulmonary team consulted and chest tube placed, s/p 1.8 L on initial drainage, then 2.6L drained 2/16. Repeat imaging showed moderate-large left sided pneumothorax. Transudative effusion by Light's criteria.   -CT surgery following, appreciate recs  -follow up additional pleural fluid studies still in progress: cell count with diff, pH, culture, gram stain, ADA, AFB neg, fungal culture  - ECHO  - Effusion and pneumothorax appear resolved on CXR this AM, will repeat CXR later today and likely plan for tube removal and d/c today    #Pneumothorax  As above. 2/17 CXR in AM showed worsening of pneumothorax, therefore Pulm team came and rearranged chest tube, with f/u CXR much improved.   -Plan continue with 100% NRB through the day with chest tube to suction  -Pneumothorax appears resolved on CXR this AM.     Problem/Plan - 2:  ·  Problem: Hyponatremia.  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  - fluid restriction to 1.0 L per day   - adding Salt Tabs 2g Q8  - monitor BMP.
79 yo M with no significant PMH presents with SOB associated with a chronic large left pleural effusion, originally concern for hemothorax given association of effusion with trauma, but chest tube performed at bedside consistent with serous fluid, etiology unclear.
78 year old male with PMH BPH, left proximal humerus fracture in December repaired in January and pleural effusion who presents from Dr. Pedraza's office with worsening shortness of breath and enlargement of pleural effusion.  The patient had a humerus fracture in December and was incidentally found to have L sided pleural effusion but he was asymptomatic and was instructed to follow up with a pulmonologist as an outpatient.  When the patient returned for his humerus fracture repair surgery in January, the plan was to do a thoracentesis but the patient refused.  Over the past 1.5 weeks, the patient states he has had dyspnea on exertion, and just walking to the bathroom makes him short of breath.  He made an appointment with Dr. Pedraza and at her office and was found to be dyspneic and an ultrasound showed enlargement of the pleural effusion so she sent him to the ED.  In the ED, vital signs were /103, HR 81, RR 16, temperature 97.7 degrees F and saturating 98% on room air. XR showed large L sided pleural effusion.  Pulmonary team consulted and placed a chest tube which drained 1.6L of serous fluid.  Patient admitted for further management of pleural effusion. Post placement CXR showed complete resolution of effusion but with large pneumothorax. Thoracic surgery consulted for evaluation. Patient now being monitored and awaiting possible CT scan done.      Problem #1: pleural effusion   - Pigtail placed by pulmonary team  - Continue CT management, will follow along    Problem#2: Pneumothorax  - Airspace 2/2 lung not re-expanding fully, 2/2 pleural effusion x 2 months    Problem#3: Humeral fx  - s/p surgery, healing well  - continue care per primary team, continue PT    Problem # 4: BPH  - Continue Flomax

## 2019-02-19 NOTE — PROGRESS NOTE ADULT - PROBLEM SELECTOR PLAN 1
Pt with L sided pleural effusion thought to be reactive s/p fall injury in December prior to presentation. Pulmonary team consulted and chest tube placed, s/p 1.8 L on initial drainage, then 2.6L drained 2/16. Repeat imaging showed moderate-large left sided pneumothorax. Transudative effusion by Light's criteria.   -CT surgery following, appreciate recs  -follow up additional pleural fluid studies still in progress: cell count with diff, pH, culture, gram stain, ADA, AFB neg, fungal culture  - ECHO  - Effusion and pneumothorax appear resolved on CXR this AM, will repeat CXR later today and likely plan for tube removal and d/c tomorrow    #Pneumothorax  As above. 2/17 CXR in AM showed worsening of pneumothorax, therefore Pulm team came and rearranged chest tube, with f/u CXR much improved.   -Plan continue with 100% NRB through the day with chest tube to suction  -Pneumothorax appears resolved on CXR this AM Pt with L sided pleural effusion thought to be reactive s/p fall injury in December prior to presentation. Pulmonary team consulted and chest tube placed, s/p 1.8 L on initial drainage, then 2.6L drained 2/16. Repeat imaging showed moderate-large left sided pneumothorax. Transudative effusion by Light's criteria.   -CT surgery following, appreciate recs  -follow up additional pleural fluid studies still in progress: cell count with diff, pH, culture, gram stain, ADA, AFB neg, fungal culture  - ECHO  - off suction this AM, will repeat CXR in afternoon and clamp/remove if indicated    #Pneumothorax  As above. 2/17 CXR in AM showed worsening of pneumothorax, therefore Pulm team came and rearranged chest tube, with f/u CXR much improved.   -off suction this AM, will repeat CXR in afternoon and clamp/remove if indicated

## 2019-02-19 NOTE — CONSULT NOTE ADULT - SUBJECTIVE AND OBJECTIVE BOX
Patient is a 78y old  Male who presents with a chief complaint of Pleural Effusion (19 Feb 2019 08:14)       HPI:  78 year old male with PMH BPH, left proximal humerus fracture in December repaired in January and pleural effusion who presents from Dr. Pedraza's office with worsening shortness of breath and enlargement of pleural effusion.  The patient had a humerus fracture in December and was incidentally found to have L sided pleural effusion but he was asymptomatic and was instructed to follow up with a pulmonologist as an outpatient.  When the patient returned for his humerus fracture repair surgery in January, the plan was to do a thoracentesis but the patient refused.  Over the past 1.5 weeks, the patient states he has had dyspnea on exertion, and just walking to the bathroom makes him short of breath.  He made an appointment with Dr. Pedraza and at her office today, he was dyspneic and an ultrasound showed enlargement of the pleural effusion so she sent him to the ED.  In the ED, vital signs were /103, HR 81, RR 16, temperature 97.7 degrees F and saturating 98% on room air.  Labs significant for Na of 128.  CXR showed large L sided pleural effusion.  Pulmonary team consulted and placed a chest tube which drained 1.6L.  Patient admitted for further management of pleural effusion. (15 Feb 2019 20:32)      PAST MEDICAL & SURGICAL HISTORY:  BPH (benign prostatic hyperplasia)  Humerus fracture  Pleural effusion  Humerus fracture      MEDICATIONS  (STANDING):  aspirin enteric coated 81 milliGRAM(s) Oral daily  docusate sodium 100 milliGRAM(s) Oral every 12 hours  heparin  Injectable 5000 Unit(s) SubCutaneous every 8 hours  melatonin 3 milliGRAM(s) Oral at bedtime  sodium chloride 2 Gram(s) Oral every 8 hours  tamsulosin 0.4 milliGRAM(s) Oral at bedtime    MEDICATIONS  (PRN):      Social History: lives with his wife in an elevator accessible apartment building    Functional Level Prior to Admission: ADL independent, walks without assistive devices    FAMILY HISTORY:  No pertinent family history in first degree relatives      CBC Full  -  ( 19 Feb 2019 06:57 )  WBC Count : 7.00 K/uL  Hemoglobin : 13.8 g/dL  Hematocrit : 40.6 %  Platelet Count - Automated : 242 K/uL  Mean Cell Volume : 94.9 fl  Mean Cell Hemoglobin : 32.2 pg  Mean Cell Hemoglobin Concentration : 34.0 gm/dL  Auto Neutrophil # : x  Auto Lymphocyte # : x  Auto Monocyte # : x  Auto Eosinophil # : x  Auto Basophil # : x  Auto Neutrophil % : x  Auto Lymphocyte % : x  Auto Monocyte % : x  Auto Eosinophil % : x  Auto Basophil % : x      02-19    132<L>  |  99  |  8   ----------------------------<  105<H>  4.2   |  22  |  0.77    Ca    9.4      19 Feb 2019 06:57  Mg     1.9     02-19              Radiology:    < from: CT Chest No Cont (02.16.19 @ 22:36) >  EXAM:  CT CHEST                          PROCEDURE DATE:  02/16/2019          INTERPRETATION:  CT Chest    History: Pneumothorax after chest tube placement for pleural effusion.    Technique: CT scan of chest performed from lung apices through lung  bases.  1 mm thick axial images, axial MIPS, and sagittal and coronal   reformatted images were produced. Intravenous contrast was not   administered, as ordered.    Comparison: Chest CT from 12/19/2018.    Findings:     Lungs, pleura and large airways: Pleural pigtail catheter at the left   lung base. Small to moderate left hydropneumothorax. Thickening of the   visceral pleura of the underexpanded lungs with volume loss. Curvilinear   band of centrilobular groundglass and micronodular airspace opacities in   left upper lobe, probably representing mild expansion pulmonary edema.   Moderate left lower lobe volume loss. Mild right basilar linear   atelectasis. The central airways are patent. No visualized bronchopleural   communication, though cannot be excluded. No change in calcified plaque   on the left hemidiaphragmatic pleura and at the lung apices.    Adenopathy: No thoracic lymphadenopathy is seen, though evaluation is   limited without use of intravenous contrast..    Heart and pericardium:  Heart size is normal. No pericardial effusion.   Mild aortic valve calcifications.    Vessels:  Dense coronary artery calcifications.    Chest wall and lower neck:  Subcentimeter hypoattenuating lesion in the   right lobe the thyroid.    Upper abdomen: 1.2 cm hypoattenuating lesion in the right lobe of the   liver, not significant change compared to 12/19/2018. Small hiatal hernia.    Bones:  Mild degenerative changes of the spine. Multilevel flowing   syndesmophytes consistent with DISH. Interval left glenohumeral   arthroplasty.    Impression:   Mild to moderate left hydropneumothorax, correlate for trapped lung.   Pleural pigtail catheter at the left lung base. Probable mild left upper   lobe reexpansion pulmonary edema.          Vital Signs Last 24 Hrs  T(C): 36.9 (19 Feb 2019 04:58), Max: 37.2 (19 Feb 2019 01:33)  T(F): 98.5 (19 Feb 2019 04:58), Max: 99 (19 Feb 2019 01:33)  HR: 80 (19 Feb 2019 04:46) (68 - 86)  BP: 136/85 (19 Feb 2019 04:46) (108/68 - 137/79)  BP(mean): 103 (19 Feb 2019 04:46) (83 - 109)  RR: 18 (19 Feb 2019 00:06) (18 - 27)  SpO2: 93% (19 Feb 2019 04:46) (93% - 97%)    REVIEW OF SYSTEMS:    CONSTITUTIONAL: No fever, weight loss, or fatigue  EYES: No eye pain, visual disturbances, or discharge  ENMT:  No difficulty hearing, tinnitus, vertigo; No sinus or throat pain  NECK: No pain or stiffness  BREASTS: No pain, masses, or nipple discharge  RESPIRATORY: No cough, wheezing, chills or hemoptysis; No shortness of breath  CARDIOVASCULAR: No chest pain, palpitations, dizziness, or leg swelling  GASTROINTESTINAL: No abdominal or epigastric pain. No nausea, vomiting, or hematemesis; No diarrhea or constipation. No melena or hematochezia.  GENITOURINARY: No dysuria, frequency, hematuria, or incontinence  NEUROLOGICAL: No headaches, memory loss, loss of strength, numbness, or tremors  SKIN: No itching, burning, rashes, or lesions   LYMPH NODES: No enlarged glands  ENDOCRINE: No heat or cold intolerance; No hair loss  MUSCULOSKELETAL: No joint pain or swelling; No muscle, back, or extremity pain  PSYCHIATRIC: No depression, anxiety, mood swings, or difficulty sleeping  HEME/LYMPH: No easy bruising, or bleeding gums  ALLERGY AND IMMUNOLOGIC: No hives or eczema  VASCULAR: no swelling, erythema      Physical Exam: WDWN 79 yo  gentleman lying in semi Sanchez's position, feels much better    Head: normocephalic, atraumatic    Eyes: PERRLA, EOMI, no nystagmus, sclera anicteric    ENT: nasal discharge, uvula midline, no oropharyngeal erythema/exudate    Neck: supple, negative JVD, negative carotid bruits, no thyromegaly    Chest: dec breath sounds left base    Cardiovascular: regular rate and rhythm, neg murmurs/rubs/gallops    Abdomen: soft, non distended, non tender, negative rebound/guarding, normal bowel sounds, neg hepatosplenomegaly    Extremities: 1 + LE edema, negative calf tenderness to palpation, negative Jin's sign    :     Neurologic Exam:    Alert and oriented to person, place, date/year, speech fluent w/o dysarthria, recent and remote memory intact, repetition intact, comprehension intact,     Cranial Nerves:     II:                       pupils equal, round and reactive to light, visual fields intact   III/ IV/VI:            extraocular movements intact, neg nystagmus, ptosis  V:                       facial sensation intact, V1-3 normal  VII:                     face symmetric, no droop, normal eye closure and smile  VIII:                    hearing intact to finger rub bilaterally  IX/ X:                 soft palate rise symmetrical  XI:                      head turning, shoulder shrug normal  XII:                     tongue midline    Motor Exam:    Upper Extremities:     RIght:   5/5   /intrinsics               5/5  wrist flexors/extensors               5/5  biceps/triceps               5/5  deltoid               negative drift    Left :     5/5  /intrinsics               5/5  wrist flexors/extensors               5/5 biceps/triceps               5/5 deltoid               negative drift    Lower Extremities:                 Right:     5/5  DF/PF/ evertors/ invertors                5/5  Quad/ hamstrings                5/5  hip flexors/adductors/abductors                negative drift    Left:       5/5  DF/PF/ evertors/ invertors                5/5  Quad/ hamstrings                5/5  hip flexors/adductors/abductors                    negative drift      Sensory:    intact to LT/PP in all UE/LE dermatomes    DTR:            = biceps/     triceps/     brachioradialis                      = patella/   medial hamstring/ankle                      neg clonus                      neg Babinski                      neg Hoffmans    Gait:  not tested        PM&R Impression:    1) deconditioned  2) left pleural effusion/ s/p chest tube          Recommendations:    1) Physical therapy focusing on therapeutic exercises, bed mobility/transfer out of bed evaluation, progressive ambulation with assistive devices prn.    2) Anticipated Disposition Plan/Recs: d/c home

## 2019-02-19 NOTE — PROGRESS NOTE ADULT - PROBLEM SELECTOR PLAN 4
F: none   E: Will replete to K>4 and Mg>2  N: Regular diet with 1.0 L fluid restriction, salt tabs 2g Q8  Dispo: 7LA  Code Status: Full Code

## 2019-02-19 NOTE — PHYSICAL THERAPY INITIAL EVALUATION ADULT - PERTINENT HX OF CURRENT PROBLEM, REHAB EVAL
78M repaired in January and pleural effusion who presents from Dr. Pedraza's office with worsening shortness of breath and enlargement of pleural effusion.

## 2019-02-19 NOTE — PROGRESS NOTE ADULT - PROBLEM SELECTOR PLAN 1
- present since fall 2 months ago  - Effusion consistent with transudative, may be post inflammatory 2/2 fall which is further supported by lymphocytosis on cell count  - Will need to F/U cytology  - Chest CT with no evidence of lung mass or endobronchial lesion; persistent hydropneumothorax with re-expansion pulmonary edema and possible bleb of LLL visualized. Also noted is small area of calcification along diaphragm, but patient without obvious asbestos exposure.

## 2019-02-20 VITALS — TEMPERATURE: 97 F

## 2019-02-20 PROBLEM — N40.0 BENIGN PROSTATIC HYPERPLASIA WITHOUT LOWER URINARY TRACT SYMPTOMS: Chronic | Status: ACTIVE | Noted: 2019-02-15

## 2019-02-20 LAB
ADENOSINE DEAMINASE PLR-CCNC: <1.6 U/L — SIGNIFICANT CHANGE UP (ref 0–9.4)
NON-GYNECOLOGICAL CYTOLOGY STUDY: SIGNIFICANT CHANGE UP

## 2019-02-20 PROCEDURE — 99232 SBSQ HOSP IP/OBS MODERATE 35: CPT

## 2019-02-20 PROCEDURE — 71045 X-RAY EXAM CHEST 1 VIEW: CPT | Mod: 26,76

## 2019-02-20 RX ORDER — TAMSULOSIN HYDROCHLORIDE 0.4 MG/1
1 CAPSULE ORAL
Qty: 0 | Refills: 0 | COMMUNITY
Start: 2019-02-20

## 2019-02-20 RX ORDER — DOCUSATE SODIUM 100 MG
1 CAPSULE ORAL
Qty: 0 | Refills: 0 | COMMUNITY
Start: 2019-02-20

## 2019-02-20 RX ORDER — TAMSULOSIN HYDROCHLORIDE 0.4 MG/1
0 CAPSULE ORAL
Qty: 0 | Refills: 0 | COMMUNITY

## 2019-02-20 NOTE — PROGRESS NOTE ADULT - PROBLEM SELECTOR PLAN 3
- Present this morning after further drainage of chest tube  - management as above
- Not present after placement of chest tube in ED, developed after repeat drainage of 2.4 L performed 2/16/19  - CT of chest with possible small bleb at LLL, etiology of pneumothorax may be 2/2 rupture of bleb after re-expansion causing pneumothorax  - CXR this morning with almost complete resolution (~10% persistent pneumothorax remains, will take about 7 days to resolve completely)  - Chest tube clamped, will repeat CXR this afternoon and tomorrow morning. If it remains stable, will remove chest tube in AM
- Not present after placement of chest tube in ED, developed after repeat drainage of 2.4 L performed 2/16/19  - CT of chest with possible small bleb at LLL, etiology of pneumothorax may be 2/2 rupture of bleb after re-expansion causing pneumothorax vs. diaphragmatic calcification possibly suggestive of asbestos exposure.   - CXR after placed on water seal with minimal pneumothorax; however, repeat CXR in the evening with progression ~ 30%.   - May be PTX ex vacuo vs. bronchopleural fistula and may require surgical intervention  - Pt is adamant about leaving today, even if AMA. Plan will be to clamp chest tube and reassess for further progression. If PTX increases in size we will strongly advocate pt stay for surgical intervention. If it is stable, we would still recommend patient stay for surgical intervention, but if he chooses to leave we will pull the chest tube and have him follow up with CT surgery Dr. Coronado early next week. If the PTX improves, we are agreeable to remove chest tube and have early out patient follow up with CT surgery.
- Not present after placement of chest tube in ED, developed after repeat drainage of 2.4 L performed 2/16/19  - CT of chest with possible small bleb at LLL, etiology of pneumothorax may be 2/2 rupture of bleb after re-expansion causing pneumothorax vs. diaphragmatic calcification possibly suggestive of asbestos exposure.   - CXR this morning with chest tube on suction shows < 5 % pneumothorax  - Will place on waterseal and repeat CXR in 6 hrs (3pm)  - If pneumothorax continues to recur, may require surgical intervention.
- continue home Flomax
PMH of BPH  - continue home Flomax
- Not present after placement of chest tube in ED, developed after repeat drainage of 2.4 L performed 2/16/19  - CT of chest with possible small bleb at LLL, etiology of pneumothorax may be 2/2 rupture of bleb after re-expansion causing pneumothorax  - Worsening of pneumothorax on CXR this AM likely 2/2 tube blockage, improved after adjustment  - continue with oxygen supplementation, maintain chest tube to suction, repeat CXR in AM tomorrow  - If stable, will clamp chest tube and assess for recurrence of pneumothorax tomorrow.   - Appreciate CTS input.

## 2019-02-20 NOTE — PROGRESS NOTE ADULT - SUBJECTIVE AND OBJECTIVE BOX
INTERVAL HPI/OVERNIGHT EVENTS:    SUBJECTIVE: Patient seen and examined at bedside.    OBJECTIVE:    VITAL SIGNS:  ICU Vital Signs Last 24 Hrs  T(C): 37 (20 Feb 2019 05:42), Max: 37.7 (19 Feb 2019 14:30)  T(F): 98.6 (20 Feb 2019 05:42), Max: 99.9 (19 Feb 2019 14:30)  HR: 64 (20 Feb 2019 04:42) (64 - 80)  BP: 141/74 (20 Feb 2019 04:42) (110/70 - 166/86)  BP(mean): 101 (20 Feb 2019 04:42) (85 - 119)  ABP: --  ABP(mean): --  RR: 20 (20 Feb 2019 04:42) (16 - 22)  SpO2: 94% (20 Feb 2019 04:42) (87% - 96%)        02-18 @ 07:01  -  02-19 @ 07:00  --------------------------------------------------------  IN: 200 mL / OUT: 1790 mL / NET: -1590 mL    02-19 @ 07:01  -  02-20 @ 05:57  --------------------------------------------------------  IN: 0 mL / OUT: 900 mL / NET: -900 mL      CAPILLARY BLOOD GLUCOSE          PHYSICAL EXAM:    General: NAD  HEENT: NC/AT; PERRL, clear conjunctiva  Neck: supple  Respiratory: lungs CTA b/l, no wheezes or rhonchi  Cardiovascular: +S1/S2; RRR, no murmurs, rubs, or gallops  Abdomen: soft, non-tender, non-distended; +BS in all 4 quadrants  Extremities: WWP, 2+ peripheral pulses b/l; no LE edema  Skin: normal color and turgor; no rash  Neurological: AOx3, no focal deficits noted    MEDICATIONS:  MEDICATIONS  (STANDING):  aspirin enteric coated 81 milliGRAM(s) Oral daily  docusate sodium 100 milliGRAM(s) Oral every 12 hours  heparin  Injectable 5000 Unit(s) SubCutaneous every 8 hours  melatonin 3 milliGRAM(s) Oral at bedtime  sodium chloride 2 Gram(s) Oral every 8 hours  tamsulosin 0.4 milliGRAM(s) Oral at bedtime    MEDICATIONS  (PRN):      ALLERGIES:  Allergies    No Known Allergies    Intolerances        LABS:                        13.8   7.00  )-----------( 242      ( 19 Feb 2019 06:57 )             40.6     02-19    132<L>  |  99  |  8   ----------------------------<  105<H>  4.2   |  22  |  0.77    Ca    9.4      19 Feb 2019 06:57  Mg     1.9     02-19            RADIOLOGY & ADDITIONAL TESTS:

## 2019-02-20 NOTE — CHART NOTE - NSCHARTNOTEFT_GEN_A_CORE
Dr. Coronado will follow-up with this patient on 3/1/19 at 9:30am.  Please ensure this information has been included in the patient's discharge paperwork prior to going home.      Dr. Farhad Coronado  [facility]   Department of Cardiovascular and Thoracic Surgery  [place] [id number] [phone]

## 2019-02-20 NOTE — PROGRESS NOTE ADULT - PROBLEM SELECTOR PROBLEM 2
SOB (shortness of breath)
Hyponatremia
SOB (shortness of breath)
Hyponatremia
Hyponatremia
SOB (shortness of breath)

## 2019-02-20 NOTE — PROGRESS NOTE ADULT - ASSESSMENT
77 yo M with no significant PMH presents with SOB associated with a chronic large left pleural effusion, originally concern for hemothorax given association of effusion with trauma, but chest tube performed at bedside consistent with serous fluid, etiology unclear.
78 year old male with PMH BPH, left proximal humerus fracture s/p repair (1/2019) and transudative pleural effusion now s/p chest tube c/b pneumothorax, stepped up to [facility] for further management.
78 year old male with PMH BPH, left proximal humerus fracture s/p repair (1/2019) and transudative pleural effusion now s/p chest tube.
78 year old male with PMH BPH, left proximal humerus fracture s/p repair (1/2019) and transudative pleural effusion now s/p chest tube.
78YOM with PMH BPH, left proximal humerus fracture s/p repair (1/2019) and transudative pleural effusion now s/p chest tube c/b pneumothorax, stepped up to [facility] for further management currently set to constant suction and on NRB.
78YOM with PMH BPH, left proximal humerus fracture s/p repair (1/2019) and transudative pleural effusion now s/p chest tube c/b pneumothorax, stepped up to [facility] for further management.
78YOM with PMH BPH, left proximal humerus fracture s/p repair (1/2019) and transudative pleural effusion now s/p chest tube c/b pneumothorax, stepped up to [facility] for further management.    Problem/Plan - 1:  ·  Problem: Pleural effusion.  Plan: Pt with L sided pleural effusion thought to be reactive s/p fall injury in December prior to presentation. Pulmonary team consulted and chest tube placed, s/p 1.8 L on initial drainage, then 2.6L drained 2/16. Repeat imaging showed moderate-large left sided pneumothorax. Transudative effusion by Light's criteria.   -CT surgery following, appreciate recs  -follow up additional pleural fluid studies still in progress: cell count with diff, pH, culture, gram stain, ADA, AFB neg, fungal culture  - ECHO  - off suction this AM, will repeat CXR in afternoon and clamp/remove if indicated    #Pneumothorax  As above. 2/17 CXR in AM showed worsening of pneumothorax, therefore Pulm team came and rearranged chest tube, with f/u CXR much improved.   -off suction this AM, will repeat CXR in afternoon and clamp/remove if indicated.     Problem/Plan - 2:  ·  Problem: Hyponatremia.  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  - fluid restriction to 1.0 L per day   - adding Salt Tabs 2g Q8  - monitor BMP.
79 yo M with no significant PMH presents with SOB associated with a chronic large left pleural effusion, originally concern for hemothorax given association of effusion with trauma, but chest tube performed at bedside consistent with serous fluid, etiology unclear.
78YOM with PMH BPH, left proximal humerus fracture s/p repair (1/2019) and transudative pleural effusion now s/p chest tube c/b pneumothorax, stepped up to [facility] for further management currently set to constant suction and on NRB.
78YOM with PMH BPH, left proximal humerus fracture s/p repair (1/2019) and transudative pleural effusion now s/p chest tube c/b pneumothorax, stepped up to [facility] for further management.
79 yo M with no significant PMH presents with SOB associated with a chronic large left pleural effusion, originally concern for hemothorax given association of effusion with trauma, but chest tube performed at bedside consistent with serous fluid, etiology unclear.

## 2019-02-20 NOTE — PROGRESS NOTE ADULT - PROBLEM SELECTOR PROBLEM 1
Pleural effusion

## 2019-02-20 NOTE — PROGRESS NOTE ADULT - SUBJECTIVE AND OBJECTIVE BOX
Physical Medicine and Rehabilitation Progress Note:    Patient is a 78y old  Male who presents with a chief complaint of Pleural Effusion (20 Feb 2019 11:20)      HPI:  78 year old male with PMH BPH, left proximal humerus fracture in December repaired in January and pleural effusion who presents from Dr. Pedraza's office with worsening shortness of breath and enlargement of pleural effusion.  The patient had a humerus fracture in December and was incidentally found to have L sided pleural effusion but he was asymptomatic and was instructed to follow up with a pulmonologist as an outpatient.  When the patient returned for his humerus fracture repair surgery in January, the plan was to do a thoracentesis but the patient refused.  Over the past 1.5 weeks, the patient states he has had dyspnea on exertion, and just walking to the bathroom makes him short of breath.  He made an appointment with Dr. Pedraza and at her office today, he was dyspneic and an ultrasound showed enlargement of the pleural effusion so she sent him to the ED.  In the ED, vital signs were /103, HR 81, RR 16, temperature 97.7 degrees F and saturating 98% on room air.  Labs significant for Na of 128.  CXR showed large L sided pleural effusion.  Pulmonary team consulted and placed a chest tube which drained 1.6L.  Patient admitted for further management of pleural effusion. (15 Feb 2019 20:32)                            13.8   7.00  )-----------( 242      ( 19 Feb 2019 06:57 )             40.6       02-19    132<L>  |  99  |  8   ----------------------------<  105<H>  4.2   |  22  |  0.77    Ca    9.4      19 Feb 2019 06:57  Mg     1.9     02-19      Vital Signs Last 24 Hrs  T(C): 36.2 (20 Feb 2019 13:00), Max: 37.7 (19 Feb 2019 14:30)  T(F): 97.2 (20 Feb 2019 13:00), Max: 99.9 (19 Feb 2019 14:30)  HR: 86 (20 Feb 2019 12:26) (64 - 86)  BP: 138/76 (20 Feb 2019 12:26) (122/77 - 166/86)  BP(mean): 103 (20 Feb 2019 12:26) (95 - 119)  RR: 18 (20 Feb 2019 12:26) (16 - 22)  SpO2: 95% (20 Feb 2019 08:20) (94% - 96%)    MEDICATIONS  (STANDING):  aspirin enteric coated 81 milliGRAM(s) Oral daily  docusate sodium 100 milliGRAM(s) Oral every 12 hours  heparin  Injectable 5000 Unit(s) SubCutaneous every 8 hours  melatonin 3 milliGRAM(s) Oral at bedtime  sodium chloride 2 Gram(s) Oral every 8 hours  tamsulosin 0.4 milliGRAM(s) Oral at bedtime    MEDICATIONS  (PRN):    Currently Undergoing Physical Therapy at bedside.    Functional Status Assessment:    Previous Level of Function:     · Ambulation Skills	independent	  · Transfer Skills	independent	  · ADL Skills	independent	  · Work/Leisure Activity	independent	  · Additional Comments	no use of AD, 1 mechanical fall, apartment, elevator, no steps	    Cognitive Status Examination:   · Orientation	oriented to person, place, time and situation	  · Level of Consciousness	alert	  · Follows Commands and Answers Questions	100% of the time	  · Personal Safety and Judgment	intact	  · Short Term Memory	intact	  · Long Term Memory	intact	    Range of Motion Exam:   · Range of Motion Examination	no ROM deficits were identified	  · Active Range of Motion Examination	no Active ROM deficits were identified	    Manual Muscle Testing:   · Manual Muscle Testing Results	grossly assessed due to  functional observation against gravity > 3/5 MMT	    Transfer: Sit to Stand:     · Level of [place]	independent	    Transfer: Stand to Sit:     · Level of [place]	independent	    Gait Skills:     · Level of [place]	independent	  · Assistive Device	rolling walker; for CT placement, not balance	  · Gait Distance	200 feet	    Gait Analysis:     · Gait Deviations Noted	steady, no LOB, no SOB	    Balance Skills Assessment:     · Sitting Balance: Static	good balance	  · Sitting Balance: Dynamic	good balance	  · Sit-to-Stand Balance	good balance	  · Standing Balance: Static	good balance	  · Standing Balance: Dynamic	good balance	      PM&R Impression: as above    Disposition Plan Recommendations: d/c home with no post discharge rehab needs

## 2019-02-20 NOTE — PROGRESS NOTE ADULT - NSHPATTENDINGPLANDISCUSS_GEN_ALL_CORE
thoracic surgery, pulmonary, patient and his wife
pulmonary, 7L, patient and his wife
pulmonary, 7L, patient, Dr. Guillaume
pulmonary, 7L, patient, Dr. Ward
thoracic surgery, pulmonary, patient and his wife
House Staff

## 2019-02-20 NOTE — PROGRESS NOTE ADULT - PROBLEM SELECTOR PLAN 5
- HSQ  - no GI prophylaxis indicated
- HSQ  - no GI prophylaxis indicated
DVT PPx: SQH

## 2019-02-20 NOTE — PROGRESS NOTE ADULT - ATTENDING COMMENTS
I concur with the physical exam. The patient says he is feeling much better. He is sitting OOB to chair. His moderately sized left pleural effusion has completely darined. A total of approx 4.0L of yellowish color non-sanguinous fluid was drained. Some of the biochemical tests have come back demonstrating a transudate effusion. The cytology is pending; cell differential demonstrated lymphocytic predominance. The diagnosis of malignancy and unlikely mesothelioma needs to be excluded with cytology. If cytology is negative, will consider non-specific chronic pleuritis which is a described entity after blunt injury to the chest wall.  Lymphocytic sometimes exudative effusion due to pleural reaction and thickening resulting in chronic pleuritis.     A CT scan was performed. It shows a small sized hydropneumothorax. At the left base, there may be a bulla. There is evidence of a curvilinear ground glass opacity that is reminiscent of reexpansion pulmonary edema in the left lung. No mass or nodule is noted in the left lung. Also, there is a small area of pleural calcification which may signify exposure to asbestos.     The patient's chest tube has been periodically taken off suction. A small approx 20% PTX develops when off suction. It does not increase when the chest tube is clamped and when the patient walks.   After clamping the chest tube for several hrs, we opened it to suction and looked for air bubbles. 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 He may be unable to expand his lung. We will remove the chest tube and observe the patient for 2-3 hrs. If he does well and f/u CXR show no increase in size of PTX, we will remove chest tube and send patient home with a quick F/U with Dr. Coronado. Pt was instructed to come to ER if he has any symptoms of chest pain, shortness of breath or dizziness. This plan was discussed with the patient's wife as well   9. 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

## 2019-02-20 NOTE — PROGRESS NOTE ADULT - PROBLEM SELECTOR PLAN 1
Pt with L sided pleural effusion thought to be reactive s/p fall injury in December prior to presentation. Pulmonary team consulted and chest tube placed, s/p 1.8 L on initial drainage, then 2.6L drained 2/16. Repeat imaging showed moderate-large left sided pneumothorax. Transudative effusion by Light's criteria.   -CT surgery following, appreciate recs  -follow up additional pleural fluid studies still in progress: cell count with diff, pH, culture, gram stain, ADA, AFB neg, fungal culture  - ECHO  - off suction this AM, will repeat CXR in afternoon and clamp/remove if indicated    #Pneumothorax  As above. 2/17 CXR in AM showed worsening of pneumothorax, therefore Pulm team came and rearranged chest tube, with f/u CXR much improved.   -off suction this AM, will repeat CXR in afternoon and clamp/remove if indicated

## 2019-02-20 NOTE — PROGRESS NOTE ADULT - REASON FOR ADMISSION
Pleural Effusion

## 2019-02-20 NOTE — PROGRESS NOTE ADULT - PROVIDER SPECIALTY LIST ADULT
Internal Medicine
Pulmonology
Rehab Medicine
Pulmonology

## 2019-02-20 NOTE — PROGRESS NOTE ADULT - PROBLEM SELECTOR PLAN 2
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  - fluid restriction to 1.0 L per day   - adding Salt Tabs 2g Q8  - monitor BMP

## 2019-02-20 NOTE — PROGRESS NOTE ADULT - SUBJECTIVE AND OBJECTIVE BOX
Interval Events: CXR overnight with progression of pneumothorax, chest tube placed back to suction.     Patient seen and examined at bedside. He is adamant about leaving today regardless of the pneumothorax. Denies SOB, has been able to get up and ambulate without difficulty, no chest pain.         MEDICATIONS:  Pulmonary:    Antimicrobials:    Anticoagulants:  aspirin enteric coated 81 milliGRAM(s) Oral daily  heparin  Injectable 5000 Unit(s) SubCutaneous every 8 hours    Cardiac:  tamsulosin 0.4 milliGRAM(s) Oral at bedtime      Allergies    No Known Allergies    Intolerances        Vital Signs Last 24 Hrs  T(C): 36.9 (20 Feb 2019 09:16), Max: 37.7 (19 Feb 2019 14:30)  T(F): 98.4 (20 Feb 2019 09:16), Max: 99.9 (19 Feb 2019 14:30)  HR: 78 (20 Feb 2019 08:20) (64 - 80)  BP: 122/77 (20 Feb 2019 08:20) (110/70 - 166/86)  BP(mean): 95 (20 Feb 2019 08:20) (85 - 119)  RR: 18 (20 Feb 2019 08:20) (16 - 22)  SpO2: 95% (20 Feb 2019 08:20) (94% - 96%)    02-19 @ 07:01  -  02-20 @ 07:00  --------------------------------------------------------  IN: 0 mL / OUT: 1310 mL / NET: -1310 mL          PHYSICAL EXAM:    General: Well developed; well nourished; in no acute distress  Eyes: PERRL, EOM intact; conjunctiva and sclera clear  Respiratory: Clear to auscultation bilaterally without wheezing, rhonchi or rales. Chest tube site clean/dry/intact  Cardiovascular: Regular rate and rhythm. S1 and S2 Normal; No murmurs, gallops or rubs  Gastrointestinal: Soft non-tender non-distended; Normal bowel sounds  Extremities: 1+ pitting edema B/L  Neurological: Alert and oriented x3  Skin: Warm and dry. No obvious rash    LABS:      CBC Full  -  ( 19 Feb 2019 06:57 )  WBC Count : 7.00 K/uL  Hemoglobin : 13.8 g/dL  Hematocrit : 40.6 %  Platelet Count - Automated : 242 K/uL  Mean Cell Volume : 94.9 fl  Mean Cell Hemoglobin : 32.2 pg  Mean Cell Hemoglobin Concentration : 34.0 gm/dL      02-19    132<L>  |  99  |  8   ----------------------------<  105<H>  4.2   |  22  |  0.77    Ca    9.4      19 Feb 2019 06:57  Mg     1.9     02-19                        RADIOLOGY & ADDITIONAL STUDIES (The following images were personally reviewed):  CXR 2/19/19 9pm - progression of pneumothorax on left

## 2019-02-20 NOTE — PROGRESS NOTE ADULT - PROBLEM SELECTOR PROBLEM 3
Pneumothorax
BPH (benign prostatic hyperplasia)
Pneumothorax
BPH (benign prostatic hyperplasia)
BPH (benign prostatic hyperplasia)
Pneumothorax

## 2019-02-22 ENCOUNTER — OUTPATIENT (OUTPATIENT)
Dept: OUTPATIENT SERVICES | Facility: HOSPITAL | Age: 79
LOS: 1 days | End: 2019-02-22
Payer: MEDICARE

## 2019-02-22 DIAGNOSIS — S42.309A UNSPECIFIED FRACTURE OF SHAFT OF HUMERUS, UNSPECIFIED ARM, INITIAL ENCOUNTER FOR CLOSED FRACTURE: Chronic | ICD-10-CM

## 2019-02-22 PROCEDURE — 71046 X-RAY EXAM CHEST 2 VIEWS: CPT

## 2019-02-22 PROCEDURE — 71046 X-RAY EXAM CHEST 2 VIEWS: CPT | Mod: 26

## 2019-02-24 DIAGNOSIS — J95.811 POSTPROCEDURAL PNEUMOTHORAX: ICD-10-CM

## 2019-02-24 DIAGNOSIS — N40.0 BENIGN PROSTATIC HYPERPLASIA WITHOUT LOWER URINARY TRACT SYMPTOMS: ICD-10-CM

## 2019-02-24 DIAGNOSIS — J90 PLEURAL EFFUSION, NOT ELSEWHERE CLASSIFIED: ICD-10-CM

## 2019-02-24 DIAGNOSIS — E87.1 HYPO-OSMOLALITY AND HYPONATREMIA: ICD-10-CM

## 2019-02-26 PROCEDURE — 85025 COMPLETE CBC W/AUTO DIFF WBC: CPT

## 2019-02-26 PROCEDURE — C1729: CPT

## 2019-02-26 PROCEDURE — 85027 COMPLETE CBC AUTOMATED: CPT

## 2019-02-26 PROCEDURE — 99285 EMERGENCY DEPT VISIT HI MDM: CPT | Mod: 25

## 2019-02-26 PROCEDURE — 87206 SMEAR FLUORESCENT/ACID STAI: CPT

## 2019-02-26 PROCEDURE — 80076 HEPATIC FUNCTION PANEL: CPT

## 2019-02-26 PROCEDURE — 71250 CT THORAX DX C-: CPT

## 2019-02-26 PROCEDURE — 93306 TTE W/DOPPLER COMPLETE: CPT

## 2019-02-26 PROCEDURE — G0378: CPT

## 2019-02-26 PROCEDURE — 73030 X-RAY EXAM OF SHOULDER: CPT

## 2019-02-26 PROCEDURE — 82042 OTHER SOURCE ALBUMIN QUAN EA: CPT

## 2019-02-26 PROCEDURE — 73060 X-RAY EXAM OF HUMERUS: CPT

## 2019-02-26 PROCEDURE — 87075 CULTR BACTERIA EXCEPT BLOOD: CPT

## 2019-02-26 PROCEDURE — 85610 PROTHROMBIN TIME: CPT

## 2019-02-26 PROCEDURE — 80061 LIPID PANEL: CPT

## 2019-02-26 PROCEDURE — 83036 HEMOGLOBIN GLYCOSYLATED A1C: CPT

## 2019-02-26 PROCEDURE — 36415 COLL VENOUS BLD VENIPUNCTURE: CPT

## 2019-02-26 PROCEDURE — 85045 AUTOMATED RETICULOCYTE COUNT: CPT

## 2019-02-26 PROCEDURE — 70450 CT HEAD/BRAIN W/O DYE: CPT

## 2019-02-26 PROCEDURE — 87070 CULTURE OTHR SPECIMN AEROBIC: CPT

## 2019-02-26 PROCEDURE — 85730 THROMBOPLASTIN TIME PARTIAL: CPT

## 2019-02-26 PROCEDURE — 84157 ASSAY OF PROTEIN OTHER: CPT

## 2019-02-26 PROCEDURE — 87015 SPECIMEN INFECT AGNT CONCNTJ: CPT

## 2019-02-26 PROCEDURE — 87205 SMEAR GRAM STAIN: CPT

## 2019-02-26 PROCEDURE — 80053 COMPREHEN METABOLIC PANEL: CPT

## 2019-02-26 PROCEDURE — 82550 ASSAY OF CK (CPK): CPT

## 2019-02-26 PROCEDURE — 87116 MYCOBACTERIA CULTURE: CPT

## 2019-02-26 PROCEDURE — 84311 SPECTROPHOTOMETRY: CPT

## 2019-02-26 PROCEDURE — 83930 ASSAY OF BLOOD OSMOLALITY: CPT

## 2019-02-26 PROCEDURE — 71046 X-RAY EXAM CHEST 2 VIEWS: CPT

## 2019-02-26 PROCEDURE — 84443 ASSAY THYROID STIM HORMONE: CPT

## 2019-02-26 PROCEDURE — 93005 ELECTROCARDIOGRAM TRACING: CPT

## 2019-02-26 PROCEDURE — 87086 URINE CULTURE/COLONY COUNT: CPT

## 2019-02-26 PROCEDURE — 81003 URINALYSIS AUTO W/O SCOPE: CPT

## 2019-02-26 PROCEDURE — 89051 BODY FLUID CELL COUNT: CPT

## 2019-02-26 PROCEDURE — 84100 ASSAY OF PHOSPHORUS: CPT

## 2019-02-26 PROCEDURE — 83986 ASSAY PH BODY FLUID NOS: CPT

## 2019-02-26 PROCEDURE — 86900 BLOOD TYPING SEROLOGIC ABO: CPT

## 2019-02-26 PROCEDURE — 86901 BLOOD TYPING SEROLOGIC RH(D): CPT

## 2019-02-26 PROCEDURE — 84484 ASSAY OF TROPONIN QUANT: CPT

## 2019-02-26 PROCEDURE — 88112 CYTOPATH CELL ENHANCE TECH: CPT

## 2019-02-26 PROCEDURE — 88305 TISSUE EXAM BY PATHOLOGIST: CPT

## 2019-02-26 PROCEDURE — 80048 BASIC METABOLIC PNL TOTAL CA: CPT

## 2019-02-26 PROCEDURE — 86850 RBC ANTIBODY SCREEN: CPT

## 2019-02-26 PROCEDURE — 32556 INSERT CATH PLEURA W/O IMAGE: CPT

## 2019-02-26 PROCEDURE — 83735 ASSAY OF MAGNESIUM: CPT

## 2019-02-26 PROCEDURE — 84300 ASSAY OF URINE SODIUM: CPT

## 2019-02-26 PROCEDURE — 97161 PT EVAL LOW COMPLEX 20 MIN: CPT

## 2019-02-26 PROCEDURE — 82945 GLUCOSE OTHER FLUID: CPT

## 2019-02-26 PROCEDURE — 87102 FUNGUS ISOLATION CULTURE: CPT

## 2019-02-26 PROCEDURE — 83615 LACTATE (LD) (LDH) ENZYME: CPT

## 2019-02-26 PROCEDURE — 71045 X-RAY EXAM CHEST 1 VIEW: CPT

## 2019-02-26 PROCEDURE — 83935 ASSAY OF URINE OSMOLALITY: CPT

## 2019-03-01 ENCOUNTER — APPOINTMENT (OUTPATIENT)
Dept: THORACIC SURGERY | Facility: CLINIC | Age: 79
End: 2019-03-01
Payer: MEDICARE

## 2019-03-01 ENCOUNTER — OUTPATIENT (OUTPATIENT)
Dept: OUTPATIENT SERVICES | Facility: HOSPITAL | Age: 79
LOS: 1 days | End: 2019-03-01
Payer: MEDICARE

## 2019-03-01 ENCOUNTER — APPOINTMENT (OUTPATIENT)
Dept: PULMONOLOGY | Facility: CLINIC | Age: 79
End: 2019-03-01

## 2019-03-01 VITALS
BODY MASS INDEX: 24.38 KG/M2 | RESPIRATION RATE: 15 BRPM | HEART RATE: 90 BPM | TEMPERATURE: 97.8 F | SYSTOLIC BLOOD PRESSURE: 127 MMHG | DIASTOLIC BLOOD PRESSURE: 75 MMHG | OXYGEN SATURATION: 96 % | WEIGHT: 180 LBS | HEIGHT: 72 IN

## 2019-03-01 DIAGNOSIS — Z87.81 PERSONAL HISTORY OF (HEALED) TRAUMATIC FRACTURE: ICD-10-CM

## 2019-03-01 DIAGNOSIS — S42.309A UNSPECIFIED FRACTURE OF SHAFT OF HUMERUS, UNSPECIFIED ARM, INITIAL ENCOUNTER FOR CLOSED FRACTURE: Chronic | ICD-10-CM

## 2019-03-01 DIAGNOSIS — Z87.438 PERSONAL HISTORY OF OTHER DISEASES OF MALE GENITAL ORGANS: ICD-10-CM

## 2019-03-01 PROCEDURE — 71046 X-RAY EXAM CHEST 2 VIEWS: CPT | Mod: 26

## 2019-03-01 PROCEDURE — 99215 OFFICE O/P EST HI 40 MIN: CPT

## 2019-03-01 PROCEDURE — 71046 X-RAY EXAM CHEST 2 VIEWS: CPT

## 2019-03-01 NOTE — PHYSICAL EXAM
[Sclera] : the sclera and conjunctiva were normal [PERRL With Normal Accommodation] : pupils were equal in size, round, and reactive to light [Neck Appearance] : the appearance of the neck was normal [Respiration, Rhythm And Depth] : normal respiratory rhythm and effort [Apical Impulse] : the apical impulse was normal [Heart Rate And Rhythm] : heart rate was normal and rhythm regular [2+] : left 2+ [Bowel Sounds] : normal bowel sounds [Abdomen Soft] : soft [Abnormal Walk] : normal gait [Nail Clubbing] : no clubbing  or cyanosis of the fingernails [Musculoskeletal - Swelling] : no joint swelling seen [Skin Color & Pigmentation] : normal skin color and pigmentation [Skin Turgor] : normal skin turgor [] : no rash [Oriented To Time, Place, And Person] : oriented to person, place, and time [Impaired Insight] : insight and judgment were intact

## 2019-03-06 ENCOUNTER — APPOINTMENT (OUTPATIENT)
Dept: PULMONOLOGY | Facility: CLINIC | Age: 79
End: 2019-03-06
Payer: MEDICARE

## 2019-03-06 VITALS
HEIGHT: 72 IN | DIASTOLIC BLOOD PRESSURE: 70 MMHG | TEMPERATURE: 97 F | BODY MASS INDEX: 24.38 KG/M2 | OXYGEN SATURATION: 90 % | RESPIRATION RATE: 12 BRPM | WEIGHT: 180 LBS | HEART RATE: 88 BPM | SYSTOLIC BLOOD PRESSURE: 110 MMHG

## 2019-03-06 PROCEDURE — 99214 OFFICE O/P EST MOD 30 MIN: CPT

## 2019-03-06 NOTE — ASSESSMENT
[FreeTextEntry1] : 78 year old male s/p  left proximal humerus fracture in 12/18 repaired in 1/18 and pleural effusion who presents from outpatient pulmonologist office with worsening shortness of breath and enlargement of left-sided pleural effusion Chest tube  placed which drained 1.6L immediately and patient was admitted. Repeat CXR afterwards was significant for a large left pneumothorax, likely 2/2 to instrumentation, repeat CXR 2 hours later did not demonstrate full reinflation of lung. Patient had chest tube taken out on 2/20. He presents today with a CXR. \par \par Today the patient reports feeling well. He denies fever, chills, cough and hemoptysis. CXR reviewed, showed pleural effusion on the left still present. The options at this point are for  thoracentesis or surgery. The surgery would be, a LEFT VATS, robotic assisted total lung decortication. Being he is currently asymptomatic, I discussed that this was not an emergency. He reports that he is still in physical therapy for his left shoulder and wants to think about his options. He will call our office with his decision. If he becomes more SOB he should contact our office immediately or go to the ER. \par \par Plan\par 1. Will contact our office with decision of proceeding with : LEFT VATS, robotic assisted total lung decortication\par

## 2019-03-06 NOTE — HISTORY OF PRESENT ILLNESS
[FreeTextEntry1] :  78 year old male with PMHx of BPH, left proximal humerus fracture in 12/18 repaired in 1/18 and pleural effusion who presents from outpatient pulmonologist office with worsening shortness of breath and \par enlargement of left-sided pleural effusion.CXR showed large L sided pleural effusion. \par Chest tube  placed which drained 1.6L immediately and patient was admitted. Repeat CXR afterwards was significant for a large left pneumothorax, likely 2/2 to instrumentation, repeat CXR 2 hours later did not demonstrate full reinflation of lung. Pt still without resolution of pneumothorax, clamped on 2/18 but repeat still with pneumo, placed back on suction overnight, off suction in AM 2/19 but still \par without resolution of pneumo.Patient had chest tube taken out on 2/20. He presents today with a CXR. \par \par Today the patient reports feeling well. He denies fever, chills, cough and hemoptysis.\par \par CT chest completed on 2/16/19:\par - mild to moderate left hydropneumothorax, correlate for trapped lung. Pleural pigtail catheter at the left lung base.\par - mild left upper lobe reexpansion pulmonary edema\par \par \par Pleural fluid pathology completed on 2/21/19:\par - Final Diagnosis\par PLEURAL FLUID,LEFT\par \par NEGATIVE FOR MALIGNANT CELLS\par Reactive mesothelial cells.\par \par Cell block shows similar findings.\par \par \par \par

## 2019-03-07 ENCOUNTER — APPOINTMENT (OUTPATIENT)
Dept: PULMONOLOGY | Facility: CLINIC | Age: 79
End: 2019-03-07
Payer: MEDICARE

## 2019-03-07 ENCOUNTER — APPOINTMENT (OUTPATIENT)
Dept: ORTHOPEDIC SURGERY | Facility: CLINIC | Age: 79
End: 2019-03-07
Payer: MEDICARE

## 2019-03-07 ENCOUNTER — NON-APPOINTMENT (OUTPATIENT)
Age: 79
End: 2019-03-07

## 2019-03-07 VITALS
HEART RATE: 78 BPM | HEIGHT: 72 IN | WEIGHT: 180 LBS | BODY MASS INDEX: 24.38 KG/M2 | DIASTOLIC BLOOD PRESSURE: 80 MMHG | TEMPERATURE: 98.6 F | SYSTOLIC BLOOD PRESSURE: 120 MMHG | OXYGEN SATURATION: 96 %

## 2019-03-07 DIAGNOSIS — J98.19 OTHER PULMONARY COLLAPSE: ICD-10-CM

## 2019-03-07 DIAGNOSIS — Z01.818 ENCOUNTER FOR OTHER PREPROCEDURAL EXAMINATION: ICD-10-CM

## 2019-03-07 DIAGNOSIS — J90 PLEURAL EFFUSION, NOT ELSEWHERE CLASSIFIED: ICD-10-CM

## 2019-03-07 DIAGNOSIS — J93.9 PNEUMOTHORAX, UNSPECIFIED: ICD-10-CM

## 2019-03-07 PROCEDURE — 36415 COLL VENOUS BLD VENIPUNCTURE: CPT

## 2019-03-07 PROCEDURE — 93000 ELECTROCARDIOGRAM COMPLETE: CPT

## 2019-03-07 PROCEDURE — 99024 POSTOP FOLLOW-UP VISIT: CPT

## 2019-03-07 PROCEDURE — 99204 OFFICE O/P NEW MOD 45 MIN: CPT | Mod: 25

## 2019-03-07 NOTE — CONSULT LETTER
[Dear  ___] : Dear  [unfilled], [Courtesy Letter:] : I had the pleasure of seeing your patient, [unfilled], in my office today. [Please see my note below.] : Please see my note below. [Sincerely,] : Sincerely, [FreeTextEntry3] : Paola Pedraza MD, [facility]

## 2019-03-07 NOTE — HISTORY OF PRESENT ILLNESS
[FreeTextEntry1] : Pt here for f/u. 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 He is here to discuss his options. He feels better than last time but more SOB than his baseline. No swelling, some persistent dry cough. No fevers or chills.\par \par Initial eval:\par 78M here for evaluation of sob. Pt had a fall in december which resulted in a fractured left humerus and a chest wall hematoma. He underwent humeral ortho surgery and before and after the surgery had a large left effusion visualized on CXRs and on CT chest. Pt was discharged but has been progressively feeling more SOB. Over the last 3-4 days he also developed LE swelling and on initial echo had a small pericardial effusion. He denies chest pain or palpitations, no fevers or chills, no cough or phlegm.

## 2019-03-07 NOTE — ASSESSMENT
[FreeTextEntry1] : Pt with left sided trapped/unexpandable lung with subsequent hydropneumothorax and visible visceral pleural thickening on imaging. Effusion was a lymphocytic exudate, cytology negative. There was no evidence of hemothorax. It is not clear what triggered the process, appears to be chronic inflammation but cannot r/o malignancy though no other lung pathology identified. Recommend VATS surgery for diagnostic and therapeutic purposes. Pt declined quantiferon testing. All questions answered.

## 2019-03-07 NOTE — ASSESSMENT
[FreeTextEntry4] : Left lower effusion with residual pneumothorax and trapped lung\par \par Patient presented with a large left pleural effusion after his shoulder surgery. Patient had his chest tube that time which revealed atelectasis and residual pneumothorax. The chest tube was removed and patient was discharged with residual pneumothorax repeat chest x-ray revealed reaccumulation of left effusion.  Patient is schedueld for VATS and decortication.  Cytology was negative.  Await pleural biopsy\par \par preop evaluation\par No contraindication for surgery. The medical condition is optimized for surgery. There is no evidence neither of angina, CHF, arrhythmias, nor valvular disease. There is no limitation of exercise capacity. GABBY GUTHRIE is to be extubated once fully awake and able to protect airway. he is to be monitored in the recovery room.  They might benefit for high flow oxygen or noninvasive ventilation to prevent or reverse atelectasis.  Avoid oversedation. Patient is high risk for DVT and will require bimodal agents for DVT prophylaxis early mobilization is recommended. Patient is to use the incentive spirometry postoperative.  The patient was given instructions regarding the preoperative preparation. I instructed the patient to notify me if there is any change in the clinical status prior the surgery including any skin manifestations. No aspirin or NSAIDs, Naproxen, SEWELL inhibitors, herbs, green tea and vitamins prior to surgery. NPO after midnight prior to surg. \par \par \par

## 2019-03-07 NOTE — HISTORY OF PRESENT ILLNESS
[de-identified] : Left Shoulder Post op [de-identified] : 9 weeks s/p left reverse total shoulder replacement [de-identified] : Incision well healed. No erythema or drainage\par Forward flexion to 90\par Abduction to 70.  [de-identified] : Radiographs of shoulder show no evidence of implant malposition or bony changes. [de-identified] : \par \par Postoperatively, the patient is doing well and has excellent pain control. \par \par Plan: \par 78M sp rTSA 9 weeks postop\par -continue pendulum exercises\par -limit ER/IR, reaching back pocket, getting out of chair with arm\par -follow up in 6 weeks. \par  \par OT, follow up in 6 weeks

## 2019-03-07 NOTE — PHYSICAL EXAM
[General Appearance - Well Developed] : well developed [Normal Appearance] : normal appearance [General Appearance - Well Nourished] : well nourished [Normal Conjunctiva] : the conjunctiva exhibited no abnormalities [Normal Oropharynx] : normal oropharynx [Neck Appearance] : the appearance of the neck was normal [Heart Rate And Rhythm] : heart rate and rhythm were normal [Heart Sounds] : normal S1 and S2 [Murmurs] : no murmurs present [Arterial Pulses Normal] : the arterial pulses were normal [Respiration, Rhythm And Depth] : normal respiratory rhythm and effort [Exaggerated Use Of Accessory Muscles For Inspiration] : no accessory muscle use [Bowel Sounds] : normal bowel sounds [Abdomen Soft] : soft [Abdomen Tenderness] : non-tender [Abnormal Walk] : normal gait [Nail Clubbing] : no clubbing of the fingernails [Cyanosis, Localized] : no localized cyanosis [] : no rash [Cranial Nerves] : cranial nerves 2-12 were intact [Deep Tendon Reflexes (DTR)] : deep tendon reflexes were 2+ and symmetric [Oriented To Time, Place, And Person] : oriented to person, place, and time [Impaired Insight] : insight and judgment were intact [Skin Color & Pigmentation] : normal skin color and pigmentation [Skin Turgor] : normal skin turgor [FreeTextEntry1] : diminished BSs and dullness half way up on the left

## 2019-03-07 NOTE — RESULTS/DATA
[] : results reviewed [de-identified] : Left pleural effusion [de-identified] : RSR normal [de-identified] : ECHO normal

## 2019-03-08 ENCOUNTER — APPOINTMENT (OUTPATIENT)
Dept: HEART AND VASCULAR | Facility: CLINIC | Age: 79
End: 2019-03-08
Payer: MEDICARE

## 2019-03-08 VITALS — DIASTOLIC BLOOD PRESSURE: 70 MMHG | RESPIRATION RATE: 16 BRPM | HEART RATE: 80 BPM | SYSTOLIC BLOOD PRESSURE: 110 MMHG

## 2019-03-08 VITALS — WEIGHT: 180 LBS | BODY MASS INDEX: 24.38 KG/M2 | HEIGHT: 72 IN

## 2019-03-08 DIAGNOSIS — R94.31 ABNORMAL ELECTROCARDIOGRAM [ECG] [EKG]: ICD-10-CM

## 2019-03-08 PROCEDURE — 99204 OFFICE O/P NEW MOD 45 MIN: CPT

## 2019-03-08 RX ORDER — ASPIRIN 81 MG/1
81 TABLET ORAL
Refills: 0 | Status: ACTIVE | COMMUNITY

## 2019-03-08 RX ORDER — TAMSULOSIN HYDROCHLORIDE 0.4 MG/1
CAPSULE ORAL
Refills: 0 | Status: ACTIVE | COMMUNITY

## 2019-03-08 NOTE — PHYSICAL EXAM
[FreeTextEntry1] : HEENT: normal. No thyromegaly or lymphadenopathy. Neck exam reveals no JVD or bruits.  Carotid pulses are present and symetric.  Heart exam: S1, S2 regular without murmur, gallop, or rub.  The lungs are clear without evidence of wheezes, rhonchi or rales.  Abdomen: Positive bowel sounds, no rebound tenderness. Extremities reveal no clubbing, cyanosis or edema. Neurologic: Grossly nonfocal

## 2019-03-08 NOTE — REASON FOR VISIT
[Initial Evaluation] : an initial evaluation of [FreeTextEntry1] : This 78-year-old man without prior cardiac history comes in for clearance prior to needing lung resection. He denies chest pain, shortness of breath, palpitations or syncope. Past surgical history includes shoulder surgery. There is no history of adverse anesthesia reaction.

## 2019-03-08 NOTE — DISCUSSION/SUMMARY
[FreeTextEntry1] : EKG: Normal sinus rhythm, low voltage in precordial leads, abnormal EKG. \par \par Echocardiogram: Normal left ventricular size and function with an ejection fraction of 60%. No valvular abnormalities. Overall normal echo.\par \par  Impression: Low risk for general anesthesia. Abnormal EKG is of no clinical significance.

## 2019-03-09 LAB
ALBUMIN SERPL ELPH-MCNC: 4 G/DL
ALP BLD-CCNC: 114 U/L
ALT SERPL-CCNC: 30 U/L
ANION GAP SERPL CALC-SCNC: 19 MMOL/L
APPEARANCE: CLEAR
APTT BLD: 33.6 SEC
AST SERPL-CCNC: 31 U/L
BASOPHILS # BLD AUTO: 0.09 K/UL
BASOPHILS NFR BLD AUTO: 1.4 %
BILIRUB SERPL-MCNC: 0.2 MG/DL
BILIRUBIN URINE: NEGATIVE
BLOOD URINE: NEGATIVE
BUN SERPL-MCNC: 12 MG/DL
CALCIUM SERPL-MCNC: 9.5 MG/DL
CHLORIDE SERPL-SCNC: 98 MMOL/L
CO2 SERPL-SCNC: 18 MMOL/L
COLOR: NORMAL
CREAT SERPL-MCNC: 0.94 MG/DL
EOSINOPHIL # BLD AUTO: 0.29 K/UL
EOSINOPHIL NFR BLD AUTO: 4.7 %
GLUCOSE QUALITATIVE U: NEGATIVE
GLUCOSE SERPL-MCNC: 92 MG/DL
HCT VFR BLD CALC: 41.9 %
HGB BLD-MCNC: 13.3 G/DL
IMM GRANULOCYTES NFR BLD AUTO: 0.3 %
INR PPP: 1 RATIO
KETONES URINE: NEGATIVE
LEUKOCYTE ESTERASE URINE: NEGATIVE
LYMPHOCYTES # BLD AUTO: 1.31 K/UL
LYMPHOCYTES NFR BLD AUTO: 21.1 %
MAN DIFF?: NORMAL
MCHC RBC-ENTMCNC: 30.7 PG
MCHC RBC-ENTMCNC: 31.7 GM/DL
MCV RBC AUTO: 96.8 FL
MONOCYTES # BLD AUTO: 0.77 K/UL
MONOCYTES NFR BLD AUTO: 12.4 %
NEUTROPHILS # BLD AUTO: 3.73 K/UL
NEUTROPHILS NFR BLD AUTO: 60.1 %
NITRITE URINE: NEGATIVE
PH URINE: 6.5
PLATELET # BLD AUTO: 363 K/UL
POTASSIUM SERPL-SCNC: 4.7 MMOL/L
PROT SERPL-MCNC: 6.8 G/DL
PROTEIN URINE: NEGATIVE
PT BLD: 11.4 SEC
RBC # BLD: 4.33 M/UL
RBC # FLD: 15 %
SODIUM SERPL-SCNC: 135 MMOL/L
SPECIFIC GRAVITY URINE: 1.01
UROBILINOGEN URINE: NORMAL
WBC # FLD AUTO: 6.21 K/UL

## 2019-03-12 VITALS
DIASTOLIC BLOOD PRESSURE: 66 MMHG | RESPIRATION RATE: 16 BRPM | SYSTOLIC BLOOD PRESSURE: 93 MMHG | OXYGEN SATURATION: 96 % | WEIGHT: 189.38 LBS | HEART RATE: 89 BPM | TEMPERATURE: 97 F | HEIGHT: 72 IN

## 2019-03-12 RX ORDER — INFLUENZA VIRUS VACCINE 15; 15; 15; 15 UG/.5ML; UG/.5ML; UG/.5ML; UG/.5ML
0.5 SUSPENSION INTRAMUSCULAR ONCE
Qty: 0 | Refills: 0 | Status: COMPLETED | OUTPATIENT
Start: 2019-03-13 | End: 2019-03-13

## 2019-03-12 RX ORDER — CHOLECALCIFEROL (VITAMIN D3) 125 MCG
0 CAPSULE ORAL
Qty: 0 | Refills: 0 | COMMUNITY

## 2019-03-13 ENCOUNTER — APPOINTMENT (OUTPATIENT)
Dept: THORACIC SURGERY | Facility: HOSPITAL | Age: 79
End: 2019-03-13
Payer: MEDICARE

## 2019-03-13 ENCOUNTER — RESULT REVIEW (OUTPATIENT)
Age: 79
End: 2019-03-13

## 2019-03-13 ENCOUNTER — INPATIENT (INPATIENT)
Facility: HOSPITAL | Age: 79
LOS: 1 days | Discharge: HOME HEALTH | DRG: 271 | End: 2019-03-15
Attending: SPECIALIST | Admitting: SPECIALIST
Payer: MEDICARE

## 2019-03-13 DIAGNOSIS — J90 PLEURAL EFFUSION, NOT ELSEWHERE CLASSIFIED: ICD-10-CM

## 2019-03-13 DIAGNOSIS — Z96.612 PRESENCE OF LEFT ARTIFICIAL SHOULDER JOINT: Chronic | ICD-10-CM

## 2019-03-13 DIAGNOSIS — J98.19 OTHER PULMONARY COLLAPSE: ICD-10-CM

## 2019-03-13 PROBLEM — S42.309A UNSPECIFIED FRACTURE OF SHAFT OF HUMERUS, UNSPECIFIED ARM, INITIAL ENCOUNTER FOR CLOSED FRACTURE: Chronic | Status: ACTIVE | Noted: 2019-02-15

## 2019-03-13 LAB
ANION GAP SERPL CALC-SCNC: 11 MMOL/L — SIGNIFICANT CHANGE UP (ref 5–17)
APTT BLD: 27.3 SEC — LOW (ref 27.5–36.3)
BASE EXCESS BLDA CALC-SCNC: -1.6 MMOL/L — SIGNIFICANT CHANGE UP (ref -2–3)
BASOPHILS # BLD AUTO: 0.05 K/UL — SIGNIFICANT CHANGE UP (ref 0–0.2)
BASOPHILS NFR BLD AUTO: 0.5 % — SIGNIFICANT CHANGE UP (ref 0–2)
BLD GP AB SCN SERPL QL: NEGATIVE — SIGNIFICANT CHANGE UP
BUN SERPL-MCNC: 10 MG/DL — SIGNIFICANT CHANGE UP (ref 7–23)
CALCIUM SERPL-MCNC: 9.1 MG/DL — SIGNIFICANT CHANGE UP (ref 8.4–10.5)
CHLORIDE SERPL-SCNC: 100 MMOL/L — SIGNIFICANT CHANGE UP (ref 96–108)
CO2 SERPL-SCNC: 22 MMOL/L — SIGNIFICANT CHANGE UP (ref 22–31)
CREAT SERPL-MCNC: 0.8 MG/DL — SIGNIFICANT CHANGE UP (ref 0.5–1.3)
EOSINOPHIL # BLD AUTO: 0.06 K/UL — SIGNIFICANT CHANGE UP (ref 0–0.5)
EOSINOPHIL NFR BLD AUTO: 0.6 % — SIGNIFICANT CHANGE UP (ref 0–6)
GLUCOSE SERPL-MCNC: 164 MG/DL — HIGH (ref 70–99)
GRAM STN FLD: SIGNIFICANT CHANGE UP
HCO3 BLDA-SCNC: 23 MMOL/L — SIGNIFICANT CHANGE UP (ref 21–28)
HCT VFR BLD CALC: 37.4 % — LOW (ref 39–50)
HGB BLD-MCNC: 12.7 G/DL — LOW (ref 13–17)
IMM GRANULOCYTES NFR BLD AUTO: 0.5 % — SIGNIFICANT CHANGE UP (ref 0–1.5)
INR BLD: 1.03 — SIGNIFICANT CHANGE UP (ref 0.88–1.16)
LYMPHOCYTES # BLD AUTO: 0.51 K/UL — LOW (ref 1–3.3)
LYMPHOCYTES # BLD AUTO: 5 % — LOW (ref 13–44)
MAGNESIUM SERPL-MCNC: 2 MG/DL — SIGNIFICANT CHANGE UP (ref 1.6–2.6)
MCHC RBC-ENTMCNC: 31.8 PG — SIGNIFICANT CHANGE UP (ref 27–34)
MCHC RBC-ENTMCNC: 34 GM/DL — SIGNIFICANT CHANGE UP (ref 32–36)
MCV RBC AUTO: 93.5 FL — SIGNIFICANT CHANGE UP (ref 80–100)
MONOCYTES # BLD AUTO: 0.28 K/UL — SIGNIFICANT CHANGE UP (ref 0–0.9)
MONOCYTES NFR BLD AUTO: 2.7 % — SIGNIFICANT CHANGE UP (ref 2–14)
NEUTROPHILS # BLD AUTO: 9.3 K/UL — HIGH (ref 1.8–7.4)
NEUTROPHILS NFR BLD AUTO: 90.7 % — HIGH (ref 43–77)
NRBC # BLD: 0 /100 WBCS — SIGNIFICANT CHANGE UP (ref 0–0)
PCO2 BLDA: 39 MMHG — SIGNIFICANT CHANGE UP (ref 35–48)
PH BLDA: 7.39 — SIGNIFICANT CHANGE UP (ref 7.35–7.45)
PLATELET # BLD AUTO: 257 K/UL — SIGNIFICANT CHANGE UP (ref 150–400)
PO2 BLDA: 80 MMHG — LOW (ref 83–108)
POTASSIUM SERPL-MCNC: 4.9 MMOL/L — SIGNIFICANT CHANGE UP (ref 3.5–5.3)
POTASSIUM SERPL-SCNC: 4.9 MMOL/L — SIGNIFICANT CHANGE UP (ref 3.5–5.3)
PROTHROM AB SERPL-ACNC: 11.6 SEC — SIGNIFICANT CHANGE UP (ref 10–12.9)
RBC # BLD: 4 M/UL — LOW (ref 4.2–5.8)
RBC # FLD: 14.6 % — HIGH (ref 10.3–14.5)
RH IG SCN BLD-IMP: NEGATIVE — SIGNIFICANT CHANGE UP
SAO2 % BLDA: 95 % — SIGNIFICANT CHANGE UP (ref 95–100)
SODIUM SERPL-SCNC: 133 MMOL/L — LOW (ref 135–145)
SPECIMEN SOURCE: SIGNIFICANT CHANGE UP
WBC # BLD: 10.25 K/UL — SIGNIFICANT CHANGE UP (ref 3.8–10.5)
WBC # FLD AUTO: 10.25 K/UL — SIGNIFICANT CHANGE UP (ref 3.8–10.5)

## 2019-03-13 PROCEDURE — 31645 BRNCHSC W/THER ASPIR 1ST: CPT

## 2019-03-13 PROCEDURE — 32550 INSERT PLEURAL CATH: CPT

## 2019-03-13 PROCEDURE — S2900 ROBOTIC SURGICAL SYSTEM: CPT | Mod: NC

## 2019-03-13 PROCEDURE — 32609 THORACOSCOPY W/BX PLEURA: CPT

## 2019-03-13 PROCEDURE — 32609 THORACOSCOPY W/BX PLEURA: CPT | Mod: 80

## 2019-03-13 PROCEDURE — 71045 X-RAY EXAM CHEST 1 VIEW: CPT | Mod: 26

## 2019-03-13 RX ORDER — LANOLIN ALCOHOL/MO/W.PET/CERES
5 CREAM (GRAM) TOPICAL AT BEDTIME
Qty: 0 | Refills: 0 | Status: DISCONTINUED | OUTPATIENT
Start: 2019-03-13 | End: 2019-03-15

## 2019-03-13 RX ORDER — TAMSULOSIN HYDROCHLORIDE 0.4 MG/1
0.4 CAPSULE ORAL AT BEDTIME
Qty: 0 | Refills: 0 | Status: DISCONTINUED | OUTPATIENT
Start: 2019-03-13 | End: 2019-03-15

## 2019-03-13 RX ORDER — ACETAMINOPHEN 500 MG
1000 TABLET ORAL ONCE
Qty: 0 | Refills: 0 | Status: DISCONTINUED | OUTPATIENT
Start: 2019-03-13 | End: 2019-03-14

## 2019-03-13 RX ORDER — HEPARIN SODIUM 5000 [USP'U]/ML
5000 INJECTION INTRAVENOUS; SUBCUTANEOUS EVERY 8 HOURS
Qty: 0 | Refills: 0 | Status: DISCONTINUED | OUTPATIENT
Start: 2019-03-13 | End: 2019-03-15

## 2019-03-13 RX ORDER — BUPIVACAINE 13.3 MG/ML
20 INJECTION, SUSPENSION, LIPOSOMAL INFILTRATION ONCE
Qty: 0 | Refills: 0 | Status: DISCONTINUED | OUTPATIENT
Start: 2019-03-13 | End: 2019-03-15

## 2019-03-13 RX ORDER — ACETAMINOPHEN 500 MG
650 TABLET ORAL EVERY 6 HOURS
Qty: 0 | Refills: 0 | Status: DISCONTINUED | OUTPATIENT
Start: 2019-03-13 | End: 2019-03-15

## 2019-03-13 RX ORDER — SODIUM CHLORIDE 9 MG/ML
1000 INJECTION, SOLUTION INTRAVENOUS
Qty: 0 | Refills: 0 | Status: DISCONTINUED | OUTPATIENT
Start: 2019-03-13 | End: 2019-03-15

## 2019-03-13 RX ORDER — ASPIRIN/CALCIUM CARB/MAGNESIUM 324 MG
1 TABLET ORAL
Qty: 0 | Refills: 0 | COMMUNITY

## 2019-03-13 RX ORDER — PANTOPRAZOLE SODIUM 20 MG/1
40 TABLET, DELAYED RELEASE ORAL
Qty: 0 | Refills: 0 | Status: DISCONTINUED | OUTPATIENT
Start: 2019-03-13 | End: 2019-03-15

## 2019-03-13 RX ORDER — CEFAZOLIN SODIUM 1 G
2000 VIAL (EA) INJECTION EVERY 8 HOURS
Qty: 0 | Refills: 0 | Status: COMPLETED | OUTPATIENT
Start: 2019-03-13 | End: 2019-03-14

## 2019-03-13 RX ORDER — ERGOCALCIFEROL 1.25 MG/1
1 CAPSULE ORAL
Qty: 0 | Refills: 0 | COMMUNITY

## 2019-03-13 RX ORDER — CEFAZOLIN SODIUM 1 G
2000 VIAL (EA) INJECTION EVERY 8 HOURS
Qty: 0 | Refills: 0 | Status: DISCONTINUED | OUTPATIENT
Start: 2019-03-13 | End: 2019-03-13

## 2019-03-13 RX ADMIN — HEPARIN SODIUM 5000 UNIT(S): 5000 INJECTION INTRAVENOUS; SUBCUTANEOUS at 22:11

## 2019-03-13 RX ADMIN — TAMSULOSIN HYDROCHLORIDE 0.4 MILLIGRAM(S): 0.4 CAPSULE ORAL at 22:11

## 2019-03-13 RX ADMIN — Medication 2000 MILLIGRAM(S): at 22:09

## 2019-03-13 NOTE — H&P ADULT - NSICDXPASTMEDICALHX_GEN_ALL_CORE_FT
PAST MEDICAL HISTORY:  BPH (benign prostatic hyperplasia)     Humerus fracture left, 12/2018    Pleural effusion left    Pneumothorax left

## 2019-03-13 NOTE — H&P ADULT - NSHPPHYSICALEXAM_GEN_ALL_CORE
Physical Exam  CONSTITUTIONAL:                                                              WNL  NEURO:                                                                       WNL                      EYES:                                                                                WNL  ENMT:                                                                               WNL  CV:                                                                                   WNL  RESPIRATORY: decreased breath sounds to left lung fields, right lung fields CTA w/ no w/r/r  GI:                                                                                     WNL  : CUELLAR -     MUSKULOSKELETAL:                                                       WNL  SKIN / BREAST:                                                                  WNL

## 2019-03-13 NOTE — H&P ADULT - HISTORY OF PRESENT ILLNESS
79 y/o M w/ PMH of BPH, left proximal humerous fracture 12/8 repaired in 1/18 and pleural effusion who presented as an outpatient to /Dr. Cordoba w/ worsening SOB and enlargement of left pleural effusion. Pigtail catheter was placed and drained 1.6L and pt was admitted to [facility]. repeat CXR revealed large left PTX likely 2/2 to instrumentation. 2/16 CT chest revealed mild-moderate hydropneumothorax, correlate for trapped lung and mild left upper lobe re-expansion pulmonary edema. Eventually pigtail was removed and pt was to follow-up as an outpatient for serial imaging and assessment. Pt now admitted to [facility] for elective Left VATS total lung decortication. Pt denies CP, current SOB, abd pain, dizziness, HA or urinary complaints. Pt seen in [facility] reports he is feeling well, no changes in medication and has been NPO since MN.

## 2019-03-13 NOTE — PROGRESS NOTE ADULT - SUBJECTIVE AND OBJECTIVE BOX
Patient discussed  with Dr. Coronado    Operation / Date:     SUBJECTIVE ASSESSMENT:  78y Male         Vital Signs Last 24 Hrs  T(C): 36.1 (13 Mar 2019 16:05), Max: 36.1 (13 Mar 2019 16:05)  T(F): 96.9 (13 Mar 2019 16:05), Max: 96.9 (13 Mar 2019 16:05)  HR: 72 (13 Mar 2019 17:30) (66 - 72)  BP: 155/93 (13 Mar 2019 17:30) (125/81 - 155/93)  BP(mean): 98 (13 Mar 2019 17:00) (98 - 114)  RR: 20 (13 Mar 2019 17:30) (18 - 24)  SpO2: 99% (13 Mar 2019 17:30) (94% - 99%)  I&O's Detail      CHEST TUBE:  Yes/No. AIR LEAKS: Yes/No. Suction / H2O SEAL.   TAMMI DRAIN:  Yes/No.  EPICARDIAL WIRES: Yes/No.  TIE DOWNS: Yes/No.  CUELLAR: Yes/No.    PHYSICAL EXAM:    General:     Neurological:    Cardiovascular:    Respiratory:    Gastrointestinal:    Extremities:    Vascular:    Incision Sites:    LABS:                        12.7   10.25 )-----------( 257      ( 13 Mar 2019 16:23 )             37.4       COUMADIN:  Yes/No. REASON: .    PT/INR - ( 13 Mar 2019 16:23 )   PT: 11.6 sec;   INR: 1.03          PTT - ( 13 Mar 2019 16:23 )  PTT:27.3 sec    03-13    133<L>  |  100  |  10  ----------------------------<  164<H>  4.9   |  22  |  0.80    Ca    9.1      13 Mar 2019 16:23  Mg     2.0     03-13            MEDICATIONS  (STANDING):  acetaminophen  IVPB .. 1000 milliGRAM(s) IV Intermittent once  BUpivacaine liposome 1.3% Injectable 20 milliLiter(s) Local Injection once  ceFAZolin  Injectable. 2000 milliGRAM(s) IV Push every 8 hours  heparin  Injectable 5000 Unit(s) SubCutaneous every 8 hours  influenza   Vaccine 0.5 milliLiter(s) IntraMuscular once  lactated ringers. 1000 milliLiter(s) (50 mL/Hr) IV Continuous <Continuous>  pantoprazole    Tablet 40 milliGRAM(s) Oral before breakfast  tamsulosin 0.4 milliGRAM(s) Oral at bedtime    MEDICATIONS  (PRN):        RADIOLOGY & ADDITIONAL TESTS: Patient discussed  with Dr. Coronado    Operation / Date: Robotic Assisted drainage of left pleural effusion (~2.5L), pleural biopsy     SUBJECTIVE ASSESSMENT:  Patient seen in PACU; Feels well.  States he has "no pain".  States he feels "better than before" when he felt a little short of breath.  He has nasal O2 in place and is currently comfortable.  Denies chest pain, or other symptoms.     Vital Signs Last 24 Hrs  T(C): 36.1 (13 Mar 2019 16:05), Max: 36.1 (13 Mar 2019 16:05)  T(F): 96.9 (13 Mar 2019 16:05), Max: 96.9 (13 Mar 2019 16:05)  HR: 72 (13 Mar 2019 17:30) (66 - 72)  BP: 155/93 (13 Mar 2019 17:30) (125/81 - 155/93)  BP(mean): 98 (13 Mar 2019 17:00) (98 - 114)  RR: 20 (13 Mar 2019 17:30) (18 - 24)  SpO2: 99% (13 Mar 2019 17:30) (94% - 99%)  I&O's Detail      CHEST TUBE:  Yes AIR LEAKS: Yes 1/5 intermittent (on expiration)  TMAMI DRAIN No  EPICARDIAL WIRES: N/A  TIE DOWNS: No  CUELLAR: Yes    PHYSICAL EXAM:    GEN: NAD, looks comfortable.  Sitting up in his stretcher.  Able to hold a conversation.  Asking to drink something.  Reading the newspaper.    Psych: Mood appropriate  Neuro: A&Ox3.  No focal deficits.  Moving all extremities.   HEENT: No obvious abnormalities  CV: S1S2, regular, no murmurs appreciated.  No carotid bruits.  No JVD  Lungs: Some crepitus on exam, and SC air.  No wheezing, rales or rhonchi  ABD: Soft, non-tender, non-distended.  +Bowel sounds  EXT: Warm and well perfused.  No peripheral edema noted  Musculoskeletal: Moving all extremities with normal ROM, no joint swelling  PV: Pedal pulses palpable  Incision Sites: VATS incisions covered with clean dressings.  Not saturated.     LABS:                        12.7   10.25 )-----------( 257      ( 13 Mar 2019 16:23 )             37.4       COUMADIN: No    PT/INR - ( 13 Mar 2019 16:23 )   PT: 11.6 sec;   INR: 1.03          PTT - ( 13 Mar 2019 16:23 )  PTT:27.3 sec    03-13    133<L>  |  100  |  10  ----------------------------<  164<H>  4.9   |  22  |  0.80    Ca    9.1      13 Mar 2019 16:23  Mg     2.0     03-13        MEDICATIONS  (STANDING):  acetaminophen  IVPB .. 1000 milliGRAM(s) IV Intermittent once  BUpivacaine liposome 1.3% Injectable 20 milliLiter(s) Local Injection once  ceFAZolin  Injectable. 2000 milliGRAM(s) IV Push every 8 hours  heparin  Injectable 5000 Unit(s) SubCutaneous every 8 hours  influenza   Vaccine 0.5 milliLiter(s) IntraMuscular once  lactated ringers. 1000 milliLiter(s) (50 mL/Hr) IV Continuous <Continuous>  pantoprazole    Tablet 40 milliGRAM(s) Oral before breakfast  tamsulosin 0.4 milliGRAM(s) Oral at bedtime    MEDICATIONS  (PRN):        RADIOLOGY & ADDITIONAL TESTS:    CXR: Small left apical air space; Official reading pending.

## 2019-03-13 NOTE — H&P ADULT - NSHPSOCIALHISTORY_GEN_ALL_CORE
ambulating without devices   Lives with wife   no h/o smoking, or drug use   +EtOH use- 2-4 glasses of wine/night

## 2019-03-13 NOTE — H&P ADULT - ASSESSMENT
79 y/o M w/ PMH of BPH, left proximal humerous fracture 12/8 repaired in 1/18 and pleural effusion who presented as an outpatient to /Dr. Cordoba w/ worsening SOB and enlargement of left pleural effusion. Pigtail catheter was placed and drained 1.6L and pt was admitted to [facility]. repeat CXR revealed large left PTX likely 2/2 to instrumentation. 2/16 CT chest revealed mild-moderate hydropneumothorax, correlate for trapped lung and mild left upper lobe re-expansion pulmonary edema. Eventually pigtail was removed and pt was to follow-up as an outpatient for serial imaging and assessment. Pt now admitted to [facility] for elective Left VATS total lung decortication.    Plan: as discussed with Dr. Cordoba:  Type and screen sent  Consent signed and in chart  Blood on hold for OR   Plan for admit to 9 after PACU recovery

## 2019-03-13 NOTE — H&P ADULT - NSHPREVIEWOFSYSTEMS_GEN_ALL_CORE
Review of Systems  CONSTITUTIONAL:  Denies Fevers / chills, sweats, fatigue, weight loss, weight gain                                      NEURO:  Denies parathesias, seizures, syncope, confusion                                                                                EYES:  Denies Blurry vision, discharge, pain, loss of vision                                                                                    ENMT:  Denies Difficulty hearing, vertigo, dysphagia, epistaxis, recent dental work                                       CV:  Denies Chest pain, palpitations, FUENTES, orthopnea                                                                                          RESPIRATORY:  Denies Wheezing, SOB, cough / sputum, hemoptysis                                                                GI:  Denies Nausea, vommiting, diarrhea, constipation, melena, difficulty swallowing                                               : Denies Hematuria, dysuria, urgency, incontinence                                                                                         MUSKULOSKELETAL:  Denies arthritis, joint swelling, muscle weakness                                                             SKIN/BREAST:  Denies rash, itching, jason loss, masses                                                                                            PSYCH:  Denies depresion, anxiety, suicidal ideation                                                                                               HEME/LYMPH:  Denies bruises easily, enlarged lymph nodes, tender lymph nodes                                        ENDOCRINE:  Denies cold intolerance, heat intolerance, polydipsia

## 2019-03-13 NOTE — PROGRESS NOTE ADULT - SUBJECTIVE AND OBJECTIVE BOX
Interval HPI:    Patient well known to our service and to Dr. Pedraza    78 year old male with pmh of BPH, h/o femoral fracture and pleural effusion who is now s/p VATS decortication and pleurX placement. Patient was seen as outpatient and found to have enlarging pleural effusion. He was admitted and pigtail CT was placed with hydropneumothorax development. At the time it was determined that this is due to trapped lung. He was followed as outpatient and is now admitted after VATS with decortication.     At this time he denies any SOB or cough. Denies chest pain, or pain at the site of the pleurX.     MEDICATIONS:  Pulmonary:    Antimicrobials:  ceFAZolin  Injectable. 2000 milliGRAM(s) IV Push every 8 hours    Anticoagulants:  heparin  Injectable 5000 Unit(s) SubCutaneous every 8 hours    Cardiac:  tamsulosin 0.4 milliGRAM(s) Oral at bedtime    Endocrine:    Allergies    No Known Allergies    Intolerances    Vital Signs Last 24 Hrs  T(C): 36.1 (13 Mar 2019 16:05), Max: 36.1 (13 Mar 2019 16:05)  T(F): 96.9 (13 Mar 2019 16:05), Max: 96.9 (13 Mar 2019 16:05)  HR: 72 (13 Mar 2019 19:32) (66 - 74)  BP: 155/85 (13 Mar 2019 19:32) (119/90 - 155/93)  BP(mean): 112 (13 Mar 2019 19:32) (98 - 114)  RR: 18 (13 Mar 2019 19:32) (18 - 25)  SpO2: 98% (13 Mar 2019 19:32) (94% - 100%)    Physical Exam:  General: NAD, AAO x3  HEENT: No icterus,. Moist mucous membranes  Neck: No JVD noted. Supple, no meningismus  Cardio: S1, S2 noted, RRR. No murmurs, rubs or gallops  Resp: PleurX in place on Left. Reduced breath sounds at the left mid and lower lung fields with some crackles. R side CTA.   Abdo: Soft, NT, bowel sounds present. No organomegaly  Extremities: No edema noted. Pulses present b/l  Neuro: AAO x3, grossly normal motor strength.  Lymphnodes: no lymphadenopathy identified.  Skin: Dry, no rashes    LABS:  ABG - ( 13 Mar 2019 16:20 )  pH, Arterial: 7.39  pH, Blood: x     /  pCO2: 39    /  pO2: 80    / HCO3: 23    / Base Excess: -1.6  /  SaO2: 95        CBC Full  -  ( 13 Mar 2019 16:23 )  WBC Count : 10.25 K/uL  Hemoglobin : 12.7 g/dL  Hematocrit : 37.4 %  Platelet Count - Automated : 257 K/uL  Mean Cell Volume : 93.5 fl  Mean Cell Hemoglobin : 31.8 pg  Mean Cell Hemoglobin Concentration : 34.0 gm/dL  Auto Neutrophil # : 9.30 K/uL  Auto Lymphocyte # : 0.51 K/uL  Auto Monocyte # : 0.28 K/uL  Auto Eosinophil # : 0.06 K/uL  Auto Basophil # : 0.05 K/uL  Auto Neutrophil % : 90.7 %  Auto Lymphocyte % : 5.0 %  Auto Monocyte % : 2.7 %  Auto Eosinophil % : 0.6 %  Auto Basophil % : 0.5 %    03-13    133<L>  |  100  |  10  ----------------------------<  164<H>  4.9   |  22  |  0.80    Ca    9.1      13 Mar 2019 16:23  Mg     2.0     03-13    PT/INR - ( 13 Mar 2019 16:23 )   PT: 11.6 sec;   INR: 1.03        PTT - ( 13 Mar 2019 16:23 )  PTT:27.3 sec    RADIOLOGY & ADDITIONAL STUDIES (The following images were personally reviewed):  Reviewed.

## 2019-03-14 LAB
ANION GAP SERPL CALC-SCNC: 11 MMOL/L — SIGNIFICANT CHANGE UP (ref 5–17)
BUN SERPL-MCNC: 10 MG/DL — SIGNIFICANT CHANGE UP (ref 7–23)
CALCIUM SERPL-MCNC: 9.6 MG/DL — SIGNIFICANT CHANGE UP (ref 8.4–10.5)
CHLORIDE SERPL-SCNC: 99 MMOL/L — SIGNIFICANT CHANGE UP (ref 96–108)
CO2 SERPL-SCNC: 22 MMOL/L — SIGNIFICANT CHANGE UP (ref 22–31)
CREAT SERPL-MCNC: 0.74 MG/DL — SIGNIFICANT CHANGE UP (ref 0.5–1.3)
GLUCOSE SERPL-MCNC: 152 MG/DL — HIGH (ref 70–99)
HCT VFR BLD CALC: 38.4 % — LOW (ref 39–50)
HGB BLD-MCNC: 12.7 G/DL — LOW (ref 13–17)
MAGNESIUM SERPL-MCNC: 2.1 MG/DL — SIGNIFICANT CHANGE UP (ref 1.6–2.6)
MCHC RBC-ENTMCNC: 31.1 PG — SIGNIFICANT CHANGE UP (ref 27–34)
MCHC RBC-ENTMCNC: 33.1 GM/DL — SIGNIFICANT CHANGE UP (ref 32–36)
MCV RBC AUTO: 94.1 FL — SIGNIFICANT CHANGE UP (ref 80–100)
NON-GYNECOLOGICAL CYTOLOGY STUDY: SIGNIFICANT CHANGE UP
NON-GYNECOLOGICAL CYTOLOGY STUDY: SIGNIFICANT CHANGE UP
NRBC # BLD: 0 /100 WBCS — SIGNIFICANT CHANGE UP (ref 0–0)
PLATELET # BLD AUTO: 264 K/UL — SIGNIFICANT CHANGE UP (ref 150–400)
POTASSIUM SERPL-MCNC: 4.5 MMOL/L — SIGNIFICANT CHANGE UP (ref 3.5–5.3)
POTASSIUM SERPL-SCNC: 4.5 MMOL/L — SIGNIFICANT CHANGE UP (ref 3.5–5.3)
RBC # BLD: 4.08 M/UL — LOW (ref 4.2–5.8)
RBC # FLD: 14.6 % — HIGH (ref 10.3–14.5)
SODIUM SERPL-SCNC: 132 MMOL/L — LOW (ref 135–145)
WBC # BLD: 9.09 K/UL — SIGNIFICANT CHANGE UP (ref 3.8–10.5)
WBC # FLD AUTO: 9.09 K/UL — SIGNIFICANT CHANGE UP (ref 3.8–10.5)

## 2019-03-14 PROCEDURE — 99232 SBSQ HOSP IP/OBS MODERATE 35: CPT | Mod: GC

## 2019-03-14 PROCEDURE — 71045 X-RAY EXAM CHEST 1 VIEW: CPT | Mod: 26

## 2019-03-14 RX ORDER — DOCUSATE SODIUM 100 MG
100 CAPSULE ORAL THREE TIMES A DAY
Qty: 0 | Refills: 0 | Status: DISCONTINUED | OUTPATIENT
Start: 2019-03-14 | End: 2019-03-15

## 2019-03-14 RX ORDER — ALBUMIN HUMAN 25 %
250 VIAL (ML) INTRAVENOUS ONCE
Qty: 0 | Refills: 0 | Status: COMPLETED | OUTPATIENT
Start: 2019-03-14 | End: 2019-03-14

## 2019-03-14 RX ORDER — SENNA PLUS 8.6 MG/1
2 TABLET ORAL AT BEDTIME
Qty: 0 | Refills: 0 | Status: DISCONTINUED | OUTPATIENT
Start: 2019-03-14 | End: 2019-03-15

## 2019-03-14 RX ADMIN — TAMSULOSIN HYDROCHLORIDE 0.4 MILLIGRAM(S): 0.4 CAPSULE ORAL at 22:54

## 2019-03-14 RX ADMIN — HEPARIN SODIUM 5000 UNIT(S): 5000 INJECTION INTRAVENOUS; SUBCUTANEOUS at 06:30

## 2019-03-14 RX ADMIN — SENNA PLUS 2 TABLET(S): 8.6 TABLET ORAL at 22:54

## 2019-03-14 RX ADMIN — Medication 100 MILLIGRAM(S): at 22:54

## 2019-03-14 RX ADMIN — Medication 5 MILLIGRAM(S): at 00:03

## 2019-03-14 RX ADMIN — HEPARIN SODIUM 5000 UNIT(S): 5000 INJECTION INTRAVENOUS; SUBCUTANEOUS at 22:53

## 2019-03-14 RX ADMIN — Medication 2000 MILLIGRAM(S): at 06:30

## 2019-03-14 RX ADMIN — Medication 125 MILLILITER(S): at 01:29

## 2019-03-14 RX ADMIN — Medication 5 MILLIGRAM(S): at 22:53

## 2019-03-14 RX ADMIN — Medication 2000 MILLIGRAM(S): at 13:17

## 2019-03-14 NOTE — PROGRESS NOTE ADULT - SUBJECTIVE AND OBJECTIVE BOX
Interval Events:  Patient seen and examined at bedside. Patient has no symptoms at this time. Wants to go home.    MEDICATIONS:  Pulmonary:    Antimicrobials:  ceFAZolin  Injectable. 2000 milliGRAM(s) IV Push every 8 hours    Anticoagulants:  heparin  Injectable 5000 Unit(s) SubCutaneous every 8 hours    Cardiac:  tamsulosin 0.4 milliGRAM(s) Oral at bedtime    Endocrine:    Allergies    No Known Allergies    Intolerances        Vital Signs Last 24 Hrs  T(C): 36.7 (14 Mar 2019 09:51), Max: 36.9 (13 Mar 2019 21:23)  T(F): 98.1 (14 Mar 2019 09:51), Max: 98.5 (13 Mar 2019 21:23)  HR: 76 (14 Mar 2019 08:39) (66 - 76)  BP: 114/64 (14 Mar 2019 08:39) (114/64 - 155/93)  BP(mean): 84 (14 Mar 2019 08:39) (84 - 117)  RR: 18 (14 Mar 2019 08:39) (18 - 25)  SpO2: 97% (14 Mar 2019 08:39) (94% - 100%)    03-13 @ 07:01  -  03-14 @ 07:00  --------------------------------------------------------  IN: 900 mL / OUT: 1095 mL / NET: -195 mL          Physical Exam:  General: NAD, AAO x3  HEENT: No icterus,. Moist mucous membranes  Neck: No JVD noted. Supple, no meningismus  Cardio: S1, S2 noted, RRR. No murmurs, rubs or gallops  Resp: PleurX in place on Left. Reduced breath sounds at the left mid and lower lung fields with some crackles. R side CTA.   Abdo: Soft, NT, bowel sounds present. No organomegaly  Extremities: No edema noted. Pulses present b/l  Neuro: AAO x3, grossly normal motor strength.  Lymphnodes: no lymphadenopathy identified.  Skin: Dry, no rashes      LABS:  ABG - ( 13 Mar 2019 16:20 )  pH, Arterial: 7.39  pH, Blood: x     /  pCO2: 39    /  pO2: 80    / HCO3: 23    / Base Excess: -1.6  /  SaO2: 95                  CBC Full  -  ( 14 Mar 2019 06:32 )  WBC Count : 9.09 K/uL  Hemoglobin : 12.7 g/dL  Hematocrit : 38.4 %  Platelet Count - Automated : 264 K/uL  Mean Cell Volume : 94.1 fl  Mean Cell Hemoglobin : 31.1 pg  Mean Cell Hemoglobin Concentration : 33.1 gm/dL  Auto Neutrophil # : x  Auto Lymphocyte # : x  Auto Monocyte # : x  Auto Eosinophil # : x  Auto Basophil # : x  Auto Neutrophil % : x  Auto Lymphocyte % : x  Auto Monocyte % : x  Auto Eosinophil % : x  Auto Basophil % : x    03-14    132<L>  |  99  |  10  ----------------------------<  152<H>  4.5   |  22  |  0.74    Ca    9.6      14 Mar 2019 06:32  Mg     2.1     03-14      PT/INR - ( 13 Mar 2019 16:23 )   PT: 11.6 sec;   INR: 1.03          PTT - ( 13 Mar 2019 16:23 )  PTT:27.3 sec                  RADIOLOGY & ADDITIONAL STUDIES (The following images were personally reviewed):

## 2019-03-14 NOTE — PROGRESS NOTE ADULT - NSICDXPROBLEM_GEN_ALL_CORE_FT
PROBLEM DIAGNOSES  Problem: Trapped lung  Assessment and Plan: Patient with trapped lung due to unclear etiology now s/p VATS with decortication and PleurX placement.   - Concern for mesothelioma, will await final path  - This am with intermitted air leak and tidalling. CXR with loculated PTX.  Recommendation:  - Pain control per primary team.   - Pleurevac management per primary team.  - Awaiting cytology and pathology results.   - Will continue to follow.
PROBLEM DIAGNOSES  Problem: Trapped lung  Assessment and Plan: Patient with trapped lung due to unclear etiology now s/p VATS with decortication and PleurX placement.   Recommendation:  - Pain control per priamry team.   - Awaiting cytology and pathology results.   - Will continue to follow.
PROBLEM DIAGNOSES  Problem: Pleural effusion, left  Assessment and Plan:

## 2019-03-14 NOTE — PROGRESS NOTE ADULT - SUBJECTIVE AND OBJECTIVE BOX
Patient discussed on morning rounds with Dr. Coronado    Operation / Date: 3/13/19 L VATS RA drainage of pleural effusion, pleural bx    SUBJECTIVE ASSESSMENT:  78y Male seen and examined. Feels well, offers no acute complaints. Is ambulating, using iS, tolerating PO diet. Denies fever, chest pain, palpitations, SOB, abdominal pain, n/v.    Vital Signs Last 24 Hrs  T(C): 36.8 (14 Mar 2019 05:01), Max: 36.9 (13 Mar 2019 21:23)  T(F): 98.2 (14 Mar 2019 05:01), Max: 98.5 (13 Mar 2019 21:23)  HR: 72 (14 Mar 2019 05:00) (66 - 76)  BP: 133/79 (14 Mar 2019 05:00) (119/90 - 155/93)  BP(mean): 101 (14 Mar 2019 05:00) (98 - 117)  RR: 20 (14 Mar 2019 05:00) (18 - 25)  SpO2: 98% (14 Mar 2019 05:00) (94% - 100%)  I&O's Detail    13 Mar 2019 07:01  -  14 Mar 2019 07:00  --------------------------------------------------------  IN:    IV PiggyBack: 50 mL    lactated ringers.: 500 mL    Oral Fluid: 350 mL  Total IN: 900 mL    OUT:    Chest Tube: 400 mL    Indwelling Catheter - Urethral: 695 mL  Total OUT: 1095 mL    Total NET: -195 mL          CHEST TUBE:  Plurex, connected to pleuravac, 1/5 airleak w/ cough  TIE DOWNS:No.  CUELLAR: No.    PHYSICAL EXAM:    General: Patient lying comfortably in bed, no acute distress     Neurological: Alert and oriented. No focal neurological deficits     Cardiovascular: S1S2, RRR, no murmurs appreciated on exam     Respiratory: Clear to ausculation bilaterally, no wheeze/rhonchi/rales    Gastrointestinal: + BS, soft, non tender, non distended     Extremities: Warm and well perfused. No edema, no calf tenderness     Vascular: 2+ Peripheral pulses b/l     Incision Sites: L Pleurx/VATS: CDI, no drainage.     LABS:                        12.7   9.09  )-----------( 264      ( 14 Mar 2019 06:32 )             38.4       COUMADIN:  NO    PT/INR - ( 13 Mar 2019 16:23 )   PT: 11.6 sec;   INR: 1.03          PTT - ( 13 Mar 2019 16:23 )  PTT:27.3 sec    03-14    132<L>  |  99  |  10  ----------------------------<  152<H>  4.5   |  22  |  0.74    Ca    9.6      14 Mar 2019 06:32  Mg     2.1     03-14            MEDICATIONS  (STANDING):  BUpivacaine liposome 1.3% Injectable 20 milliLiter(s) Local Injection once  ceFAZolin  Injectable. 2000 milliGRAM(s) IV Push every 8 hours  docusate sodium 100 milliGRAM(s) Oral three times a day  heparin  Injectable 5000 Unit(s) SubCutaneous every 8 hours  influenza   Vaccine 0.5 milliLiter(s) IntraMuscular once  lactated ringers. 1000 milliLiter(s) (50 mL/Hr) IV Continuous <Continuous>  melatonin 5 milliGRAM(s) Oral at bedtime  pantoprazole    Tablet 40 milliGRAM(s) Oral before breakfast  senna 2 Tablet(s) Oral at bedtime  tamsulosin 0.4 milliGRAM(s) Oral at bedtime    MEDICATIONS  (PRN):  acetaminophen   Tablet .. 650 milliGRAM(s) Oral every 6 hours PRN Mild Pain (1 - 3)        RADIOLOGY & ADDITIONAL TESTS:

## 2019-03-15 ENCOUNTER — TRANSCRIPTION ENCOUNTER (OUTPATIENT)
Age: 79
End: 2019-03-15

## 2019-03-15 VITALS
OXYGEN SATURATION: 99 % | HEART RATE: 72 BPM | DIASTOLIC BLOOD PRESSURE: 73 MMHG | RESPIRATION RATE: 18 BRPM | SYSTOLIC BLOOD PRESSURE: 114 MMHG

## 2019-03-15 PROBLEM — J93.9 PNEUMOTHORAX, UNSPECIFIED: Chronic | Status: ACTIVE | Noted: 2019-03-12

## 2019-03-15 PROCEDURE — 86900 BLOOD TYPING SEROLOGIC ABO: CPT

## 2019-03-15 PROCEDURE — 85027 COMPLETE CBC AUTOMATED: CPT

## 2019-03-15 PROCEDURE — 88307 TISSUE EXAM BY PATHOLOGIST: CPT

## 2019-03-15 PROCEDURE — P9045: CPT

## 2019-03-15 PROCEDURE — 86901 BLOOD TYPING SEROLOGIC RH(D): CPT

## 2019-03-15 PROCEDURE — 71045 X-RAY EXAM CHEST 1 VIEW: CPT

## 2019-03-15 PROCEDURE — 71045 X-RAY EXAM CHEST 1 VIEW: CPT | Mod: 26,76

## 2019-03-15 PROCEDURE — 87070 CULTURE OTHR SPECIMN AEROBIC: CPT

## 2019-03-15 PROCEDURE — 80048 BASIC METABOLIC PNL TOTAL CA: CPT

## 2019-03-15 PROCEDURE — 88305 TISSUE EXAM BY PATHOLOGIST: CPT

## 2019-03-15 PROCEDURE — 83735 ASSAY OF MAGNESIUM: CPT

## 2019-03-15 PROCEDURE — 86850 RBC ANTIBODY SCREEN: CPT

## 2019-03-15 PROCEDURE — 88112 CYTOPATH CELL ENHANCE TECH: CPT

## 2019-03-15 PROCEDURE — 87205 SMEAR GRAM STAIN: CPT

## 2019-03-15 PROCEDURE — 85730 THROMBOPLASTIN TIME PARTIAL: CPT

## 2019-03-15 PROCEDURE — S2900: CPT

## 2019-03-15 PROCEDURE — 88332 PATH CONSLTJ SURG EA ADD BLK: CPT

## 2019-03-15 PROCEDURE — 99239 HOSP IP/OBS DSCHRG MGMT >30: CPT

## 2019-03-15 PROCEDURE — 85610 PROTHROMBIN TIME: CPT

## 2019-03-15 PROCEDURE — 88331 PATH CONSLTJ SURG 1 BLK 1SPC: CPT

## 2019-03-15 PROCEDURE — 82803 BLOOD GASES ANY COMBINATION: CPT

## 2019-03-15 PROCEDURE — 85025 COMPLETE CBC W/AUTO DIFF WBC: CPT

## 2019-03-15 PROCEDURE — 36415 COLL VENOUS BLD VENIPUNCTURE: CPT

## 2019-03-15 PROCEDURE — C9399: CPT

## 2019-03-15 PROCEDURE — 88341 IMHCHEM/IMCYTCHM EA ADD ANTB: CPT

## 2019-03-15 PROCEDURE — 87075 CULTR BACTERIA EXCEPT BLOOD: CPT

## 2019-03-15 RX ORDER — ACETAMINOPHEN 500 MG
2 TABLET ORAL
Qty: 0 | Refills: 0 | COMMUNITY
Start: 2019-03-15

## 2019-03-15 RX ADMIN — Medication 100 MILLIGRAM(S): at 13:19

## 2019-03-15 RX ADMIN — HEPARIN SODIUM 5000 UNIT(S): 5000 INJECTION INTRAVENOUS; SUBCUTANEOUS at 13:19

## 2019-03-15 NOTE — DISCHARGE NOTE PROVIDER - HOSPITAL COURSE
77 y/o male w/ PMHx of BPH, left proximal humerus fracture 12/8/2018 repaired on 1/16/2019, and pleural effusion who presented as an outpatient to Dr. Coronado/Dr. Cordoba w/ worsening SOB and enlargement of left pleural effusion.  Pigtail catheter was placed and drained 1.6L and pt was admitted to [facility]. Repeat CXR revealed large left PTX likely 2/2 to instrumentation.  2/16/19 CT chest revealed mild-moderate hydropneumothorax, correlate for trapped lung and mild left upper lobe re-expansion pulmonary edema.  Eventually pigtail was removed and he was followed out-patient. On 3/13/19 patient underwent L VATS RA drainage of pleural effusion, pleural bx, and placement of pleur-x catheter.  Post op pleur-x connected to pleurevac with a 1/5 airleak on valsalva.  POD 1 pleur-x catheter clamped with an enlarging right base airspace and subsequently pleur-x kept to waterseal overnight.  POD 2 repeat clamp trial done, with stable left basilar airspace.  Pleur-x capped and patient clinically stable to be discharged home.  He was sent home with extra supplies to drain his pleur-x at home.  Him and his wife received education for draining pleur-x and VNS will visit as well with plans to drain the pleur-x every Monday, Wednesday, and Friday.          35 minutes was spent with the patient reviewing the discharge material including medications, follow up appointments, recovery, concerning symptoms, and how to contact their health care providers if they have questions

## 2019-03-15 NOTE — DISCHARGE NOTE PROVIDER - NSDCFUADDINST_GEN_ALL_CORE_FT
-Please continue drainage of pleur-x every Monday, Wednesday, and Friday.  Your first drainage should be 3/18/19.  Connect pleur-x to provided drainage cannister and drain until drainage ceases, or, if patient with severe pain or coughing.  Please contact office if drainage < 100 cc consistently.      -Walk daily as tolerated and use your incentive spirometer every hour.    -No driving or strenuous activity/exercise for 6 weeks, or until cleared by your surgeon.    -Gently clean your incisions with anti-bacterial soap and water, pat dry.  You may leave them open to air.    -Call your doctor if you have shortness of breath, chest pain not relieved by pain medication, dizziness, fever >101.5, or increased redness or drainage from incisions. -Please continue drainage of pleur-x every Monday, Wednesday, and Friday.  Your first drainage should be 3/18/19.  Connect pleur-x to provided drainage cannister and drain until drainage ceases or until 1 L of fluid drained, or, if patient with severe pain or coughing.  Please contact office if drainage < 100 cc consistently.      -Walk daily as tolerated and use your incentive spirometer every hour.    -No driving or strenuous activity/exercise for 6 weeks, or until cleared by your surgeon.    -Gently clean your incisions with anti-bacterial soap and water, pat dry.  You may leave them open to air.    -Call your doctor if you have shortness of breath, chest pain not relieved by pain medication, dizziness, fever >101.5, or increased redness or drainage from incisions.

## 2019-03-15 NOTE — DISCHARGE NOTE PROVIDER - CARE PROVIDER_API CALL
Tito Cordoba (MD)  Surgery; Thoracic Surgery  [address], [address]  [place], [place] [address]  Phone: [phone]  Fax: [phone]  Follow Up Time:

## 2019-03-15 NOTE — DISCHARGE NOTE PROVIDER - NSDCCPTREATMENT_GEN_ALL_CORE_FT
PRINCIPAL PROCEDURE  Procedure: Drainage, hemothorax, using VATS  Findings and Treatment: Robotic Assisted drainage of left pleural effusion, pleural biopsy

## 2019-03-15 NOTE — PROGRESS NOTE ADULT - ASSESSMENT
-Please follow up with Dr. Cordoba on 3/21/19 at 9:30 am.  The office is located at [facility], [facility], 4th floor. Call us with any questions #[phone].  -Please continue drainage of pleur-x every Monday, Wednesday, and Friday.  Your first drainage should be 3/18/19.  Connect pleur-x to provided drainage cannister and drain until drainage ceases or until 1 L of fluid drained, or, if patient with severe pain or coughing.  Please contact office if drainage < 100 cc consistently.
78 year old male with pmh of BPH, h/o femoral fracture and pleural effusion with trapped lung who is now s/p VATS decortication and pleurX placement.
78 year old male with pmh of BPH, h/o femoral fracture and pleural effusion with trapped lung who is now s/p VATS decortication and pleurX placement.
79 y/o male w/ PMHx of BPH, left proximal humerus fracture 12/8/2018 repaired on 1/16/2019, and pleural effusion who presented as an outpatient to Dr. Coronado/Dr. Cordoba w/ worsening SOB and enlargement of left pleural effusion.  Pigtail catheter was placed and drained 1.6L and pt was admitted to [facility]. Repeat CXR revealed large left PTX likely 2/2 to instrumentation.  2/16/19 CT chest revealed mild-moderate hydropneumothorax, correlate for trapped lung and mild left upper lobe re-expansion pulmonary edema.  Eventually pigtail was removed and he was followed out-patient. On 3/13/19 patient underwent L VATS RA drainage of pleural effusion and pleural bx.  Today POD1.    A/P:  Neurovascular: No delirium. Pain well controlled with current regimen.  -Tylenol prn pain    Cardiovascular: Hemodynamically stable.   -HD stable  -no active issues  -monitor hr/bp/tele    Respiratory: 02 Sat = 98% on RA. s/p procedure above   - After dissection, it was noted that there were numerous plaques on the pleura, that had a "nodular" type appearance.  The planned decortication was not done due to fear of a possible malignancy.    -Dr. Coronado placed PleurX to pleura vac on waterseal this AM, 1/5 airleak with valsalva, f/u AM CXR  -Encourage ambulation, C+DB and Use of IS 10x / hr while awake.    GI: Stable.  -protonix for GI PPX.  -Continue bowel regimen  -PO Diet.    Renal / : BUN/Cr 10/0.74  -Monitor renal function.  -Monitor I/O's.  -Replete lytes PRN  -TOV  -Flomax for BPH    Endocrine:    -A1c5.1  -TSH wnl    Hematologic: H&H 12/38  -f/u AM CBC    ID: Afebrile, WBC 9  -complete periop abx  -Observe for SIRS/Sepsis Syndrome.    Prophylaxis:  -DVT prophylaxis with 5000 SubQ Heparin q8h.  -SCD's    Disposition:  -Home when medically ready
77 y/o male w/ PMHx of BPH, left proximal humerus fracture 12/8/2018 repaired on 1/16/2019, and pleural effusion who presented as an outpatient to Dr. Coronado/Dr. Cordoba w/ worsening SOB and enlargement of left pleural effusion.  Pigtail catheter was placed and drained 1.6L and pt was admitted to [facility]. Repeat CXR revealed large left PTX likely 2/2 to instrumentation.  2/16/19 CT chest revealed mild-moderate hydropneumothorax, correlate for trapped lung and mild left upper lobe re-expansion pulmonary edema.  Eventually pigtail was removed and he was followed out-patient.  Pt admitted to [facility] today for elective Left VATS total lung decortication.  After dissection, it was noted that there were numerous plaques on the pleura, that had a "nodular" type appearance.  The planned decortication was not done due to fear of a possible malignancy.  Intra-op pleural biopsies were taken, and ~2.5L of old pleural effusion was drained.  Due to the high volume of fluid, a PleurX catheter was placed and connected to a pleurovac.  There is a small air leak, tube to be kept to suction.  Dhillon catheter in place.  Family and patient updated on findings in the operating room.     Neuro: Pain controlled. Tylenol PRN mild pain.    CV: HR controlled.  BP slightly high.  No PMHx of HTN.  No home meds. If continues to stay high will treat.  His BP may have been related to anxiety/stress about the news he received in the PACU.   Lungs: CXR wtih small air space left side.  Dr. Coronado aware.  Keep CT to suction, CXR tomorrow am.  NC O2 as needed.    GI: Protonix;  PO diet.  Starting w/ clears.  Advance tomorrow if tolerates.    : BUN/Creatinine stable.  D/C dhillon tomorrow am.  Avoid Toradol if possible due to age.  ID: No issues.  Heme: No issues H/H stable  Dispo: CT management.  If tomorrow CXR stable, possible waterseal trial afterwards.  Home once stable enough to be on Heimlich valve or pneumostat.

## 2019-03-15 NOTE — DISCHARGE NOTE PROVIDER - NSDCFUADDAPPT_GEN_ALL_CORE_FT
-Please follow up with Dr. Cordoba on 3/21/19 at 9:30 am.  The office is located at [facility], [facility], 4th floor. Call us with any questions #[phone].

## 2019-03-15 NOTE — DISCHARGE NOTE NURSING/CASE MANAGEMENT/SOCIAL WORK - NSDCDPATPORTLINK_GEN_ALL_CORE
You can access the [facility] Portal, offered by [facility], by registering with the following website: [email or web address]

## 2019-03-15 NOTE — PROGRESS NOTE ADULT - SUBJECTIVE AND OBJECTIVE BOX
Patient discussed on morning rounds with Dr. Cordoba/Cliff      Operation / Date: 3/13 L VATS RA drainage of pleural effusion, pleural bx, and placement of pleur-x catheter    Surgeon: Dr. Cordoba    Referring Physician: Dr. Tomy Gale    SUBJECTIVE ASSESSMENT:  Feels well, no acute complaints.  Denies HA, dizziness, CP, SOB, palpitations, N/V/D/C.    Hospital Course: 79 y/o male w/ PMHx of BPH, left proximal humerus fracture 12/8/2018 repaired on 1/16/2019, and pleural effusion who presented as an outpatient to Dr. Coronado/Dr. Cordoba w/ worsening SOB and enlargement of left pleural effusion.  Pigtail catheter was placed and drained 1.6L and pt was admitted to [facility]. Repeat CXR revealed large left PTX likely 2/2 to instrumentation.  2/16/19 CT chest revealed mild-moderate hydropneumothorax, correlate for trapped lung and mild left upper lobe re-expansion pulmonary edema.  Eventually pigtail was removed and he was followed out-patient. On 3/13/19 patient underwent L VATS RA drainage of pleural effusion, pleural bx, and placement of pleur-x catheter.  Post op pleur-x connected to pleurevac with a 1/5 airleak on valsalva.  POD 1 pleur-x catheter clamped with an enlarging right base airspace and subsequently pleur-x kept to waterseal overnight.  POD 2 repeat clamp trial done, with stable left basilar airspace.  Pleur-x capped and patient clinically stable to be discharged home.  He was sent home with extra supplies to drain his pleur-x at home.  Him and his wife received education for draining pleur-x and VNS will visit as well with plans to drain the pleur-x every Monday, Wednesday, and Friday.      Vital Signs Last 24 Hrs  T(C): 37.4 (15 Mar 2019 05:01), Max: 37.6 (15 Mar 2019 01:01)  T(F): 99.3 (15 Mar 2019 05:01), Max: 99.7 (15 Mar 2019 01:01)  HR: 72 (15 Mar 2019 08:29) (61 - 76)  BP: 114/73 (15 Mar 2019 08:29) (109/71 - 126/80)  BP(mean): --  RR: 18 (15 Mar 2019 08:29) (16 - 18)  SpO2: 99% (15 Mar 2019 08:29) (95% - 99%)  I&O's Detail    14 Mar 2019 07:01  -  15 Mar 2019 07:00  --------------------------------------------------------  IN:  Total IN: 0 mL    OUT:    Chest Tube: 70 mL    Voided: 920 mL  Total OUT: 990 mL    Total NET: -990 mL      15 Mar 2019 07:01  -  15 Mar 2019 18:17  --------------------------------------------------------  IN:  Total IN: 0 mL    OUT:    Chest Tube: 100 mL    Voided: 300 mL  Total OUT: 400 mL    Total NET: -400 mL    EPICARDIAL WIRES REMOVED: Yes  TIE DOWNS REMOVED: Yes    PHYSICAL EXAM:    General: OOB in chair, comfortable, NAD    Neurological: A&O x 3 MONTELONGO, no focal deficits    Cardiovascular: S1S2 RRR No M/G/R    Respiratory: CTA b/l No W/R/R    Gastrointestinal: soft, NT/ND    Extremities: trace pitting edema b/l    Vascular: warm and well perfused b/l    Incision Sites: left VATs incisions healing well, pleur-x insertion site clean, no erythema or drainage.     LABS:                        12.7   9.09  )-----------( 264      ( 14 Mar 2019 06:32 )             38.4     COUMADIN:  No.       03-14    132<L>  |  99  |  10  ----------------------------<  152<H>  4.5   |  22  |  0.74    Ca    9.6      14 Mar 2019 06:32  Mg     2.1     03-14    MEDICATIONS  (STANDING):  see med rec      Discharge CXR: stable left basilar PTX official read pending    Discharge ECHO: N/A

## 2019-03-15 NOTE — DISCHARGE NOTE NURSING/CASE MANAGEMENT/SOCIAL WORK - NSDCFUADDAPPT_GEN_ALL_CORE_FT
-Please follow up with Dr. Cordoba on 3/21/19 at 9:30 am.  The office is located at [facility], [facility], 4th floor. Call us with any questions #[phone].

## 2019-03-15 NOTE — PROGRESS NOTE ADULT - REASON FOR ADMISSION
pleural effusion
left VATS total lung decortication

## 2019-03-15 NOTE — DISCHARGE NOTE PROVIDER - NSDCHHNEEDSERVICEOTHER_GEN_ALL_CORE_FT
Drainage of pleur-x.  Please continue to drain every Monday, Wednesday, and Friday.  First drainage to be on 3/15/18. Connect pleur-x to provided drainage cannister and drain until drainage ceases, or, if patient with severe pain or coughing.  Please contact office if drainage < 100 cc consistently. Drainage of pleur-x.  Please continue to drain every Monday, Wednesday, and Friday.  First drainage to be on 3/15/18. Connect pleur-x to provided drainage cannister and drain until drainage ceases or until 1 L of fluid drained, or, if patient with severe pain or coughing.  Please contact office if drainage < 100 cc consistently.

## 2019-03-15 NOTE — DISCHARGE NOTE PROVIDER - NSDCCPCAREPLAN_GEN_ALL_CORE_FT
PRINCIPAL DISCHARGE DIAGNOSIS  Diagnosis: BPH (benign prostatic hyperplasia)  Assessment and Plan of Treatment:

## 2019-03-16 LAB
CULTURE RESULTS: NO GROWTH — SIGNIFICANT CHANGE UP
SPECIMEN SOURCE: SIGNIFICANT CHANGE UP

## 2019-03-19 DIAGNOSIS — Z09 ENCOUNTER FOR FOLLOW-UP EXAMINATION AFTER COMPLETED TREATMENT FOR CONDITIONS OTHER THAN MALIGNANT NEOPLASM: ICD-10-CM

## 2019-03-20 LAB
CULTURE RESULTS: SIGNIFICANT CHANGE UP
SPECIMEN SOURCE: SIGNIFICANT CHANGE UP

## 2019-03-21 ENCOUNTER — APPOINTMENT (OUTPATIENT)
Dept: THORACIC SURGERY | Facility: CLINIC | Age: 79
End: 2019-03-21
Payer: MEDICARE

## 2019-03-21 ENCOUNTER — OUTPATIENT (OUTPATIENT)
Dept: OUTPATIENT SERVICES | Facility: HOSPITAL | Age: 79
LOS: 1 days | End: 2019-03-21
Payer: MEDICARE

## 2019-03-21 ENCOUNTER — APPOINTMENT (OUTPATIENT)
Age: 79
End: 2019-03-21
Payer: MEDICARE

## 2019-03-21 DIAGNOSIS — Z96.612 PRESENCE OF LEFT ARTIFICIAL SHOULDER JOINT: Chronic | ICD-10-CM

## 2019-03-21 PROCEDURE — 99213 OFFICE O/P EST LOW 20 MIN: CPT

## 2019-03-21 PROCEDURE — 71046 X-RAY EXAM CHEST 2 VIEWS: CPT

## 2019-03-21 PROCEDURE — 71046 X-RAY EXAM CHEST 2 VIEWS: CPT | Mod: 26

## 2019-03-22 ENCOUNTER — OUTPATIENT (OUTPATIENT)
Dept: OUTPATIENT SERVICES | Facility: HOSPITAL | Age: 79
LOS: 1 days | End: 2019-03-22
Payer: MEDICARE

## 2019-03-22 DIAGNOSIS — Z96.612 PRESENCE OF LEFT ARTIFICIAL SHOULDER JOINT: Chronic | ICD-10-CM

## 2019-03-22 LAB
GLUCOSE BLDC GLUCOMTR-MCNC: 94 MG/DL — SIGNIFICANT CHANGE UP (ref 70–99)
SURGICAL PATHOLOGY STUDY: SIGNIFICANT CHANGE UP

## 2019-03-22 PROCEDURE — A9552: CPT

## 2019-03-22 PROCEDURE — 78815 PET IMAGE W/CT SKULL-THIGH: CPT

## 2019-03-22 PROCEDURE — 82962 GLUCOSE BLOOD TEST: CPT

## 2019-03-22 PROCEDURE — 78815 PET IMAGE W/CT SKULL-THIGH: CPT | Mod: 26,PI

## 2019-03-27 DIAGNOSIS — J91.0 MALIGNANT PLEURAL EFFUSION: ICD-10-CM

## 2019-03-27 DIAGNOSIS — C76.1 MALIGNANT NEOPLASM OF THORAX: ICD-10-CM
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
## 2019-03-27 DIAGNOSIS — J90 PLEURAL EFFUSION, NOT ELSEWHERE CLASSIFIED: ICD-10-CM

## 2019-03-27 DIAGNOSIS — C38.0 MALIGNANT NEOPLASM OF HEART: ICD-10-CM
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
## 2019-03-27 DIAGNOSIS — J95.811 POSTPROCEDURAL PNEUMOTHORAX: ICD-10-CM

## 2019-03-27 DIAGNOSIS — N40.0 BENIGN PROSTATIC HYPERPLASIA WITHOUT LOWER URINARY TRACT SYMPTOMS: ICD-10-CM

## 2019-03-27 DIAGNOSIS — Z79.82 LONG TERM (CURRENT) USE OF ASPIRIN: ICD-10-CM

## 2019-04-10 LAB
CULTURE RESULTS: SIGNIFICANT CHANGE UP
SPECIMEN SOURCE: SIGNIFICANT CHANGE UP

## 2019-04-16 NOTE — ED PROVIDER NOTE - CADM POA CENTRAL LINE
04/15/19 2216   Provider Notification   Provider Name/Title C Inga   Method of Notification In Department   Request Evaluate in Person   Notification Reason Patient Arrived   Pt arrived to  complaining of pelvic pressure and back pain. Pt denies LOF, bleeding, or contractions. CNM notified of pt arrival and at bedside. SVE closed/long. Continue to monitor FHR.    No

## 2019-04-16 NOTE — HISTORY OF PRESENT ILLNESS
[FreeTextEntry1] : 78 year old male with PMHx of BPH, left proximal humerus fracture in 12/18 repaired in 1/18 and pleural effusion who presents from outpatient pulmonologist office with worsening shortness of breath and \par enlargement of left-sided pleural effusion.CXR showed large L sided pleural effusion. \par Chest tube placed which drained 1.6L immediately and patient was admitted. Repeat CXR afterwards was significant for a large left pneumothorax. He is now s/p  LEFT VATS robotic assisted, drainage of pleural effusion, pleural bx, and placement of pleur-x catheter on 3/13/19 with pathology revealing  malignant mesothelioma, sarcomatoid type. At his last visit he was referred to Dr. Yariel Garcia for management of mesothelioma. He presents today for re-evaluation of pleur-x catheter with a CXR .\par \par PET scan completed on 3/22/19:\par  - findings typical of mesothelioma involving virtually all of the pleural surfaces in the left lung\par - the left lung has failed to expand to fill the left thoracic cavity and there is a persistent pleural effusion

## 2019-04-18 ENCOUNTER — APPOINTMENT (OUTPATIENT)
Dept: ORTHOPEDIC SURGERY | Facility: CLINIC | Age: 79
End: 2019-04-18

## 2019-04-25 ENCOUNTER — APPOINTMENT (OUTPATIENT)
Dept: THORACIC SURGERY | Facility: CLINIC | Age: 79
End: 2019-04-25

## 2019-07-25 NOTE — PATIENT PROFILE ADULT - HAS THE PATIENT EXPERIENCED ANY OF THE FOLLOWING WITHIN THE WEEK PRIOR TO ADMISSION?
07/25/19 1554   Final Note   Assessment Type Final Discharge Note   Anticipated Discharge Disposition Home      fall

## 2019-12-16 NOTE — H&P ADULT - NSHPSOCIALHISTORY_GEN_ALL_CORE
Left message for pt to call the office back to schedule her appt with .  
Lives with wife, drinks alcohol daily last drink last night

## 2020-03-11 NOTE — PATIENT PROFILE ADULT - NSPROMEDSPATCH_GEN_A_NUR
Health Call Center    Phone Message    May a detailed message be left on voicemail: yes     Reason for Call: Medication Question or concern regarding medication   Prescription Clarification  Name of Medication: Chorionic Gonadotropin (HCG) 6000 units SOLR   Prescribing Provider: Dr. Tejada   Pharmacy: [facility] - [place], [place] - [address]    What on the order needs clarification? Kary with  pharmacy requesting a call from provider/nurse to get verbal clarification on this med order. Please advise.    Action Taken: Message routed to:  [facility] ([facility]): Endocrinology    Travel Screening: Not Applicable   none

## 2020-12-21 NOTE — CONSULT NOTE ADULT - ASSESSMENT
Sutures taken out today.,    Soak your hand in warm or hot soapy water four times daily for 15 min.    Elevate hand above heart for rest of the day and night.    Follow up if not continuing to improve into next week.    Go to emergency room for any new fevers, chills, or red streaking up the arm.     Justin Kendall MD  Internal Medicine and Pediatrics      Patient is an 79 yo M who denies PMHx presents s/p fall at home, found to have left sided pleural effusion    Problem/Plan - 1:  ·  Problem: Fall.  Plan: Patient with falls that describe as orthostatic hypotension with falls  - Echocardiogram given third heart sound and JVD on exam  - Fall precautions  - Orthostatics, follow up if fluid responsive or not  - PT consult.     Problem/Plan - 2:  ·  Problem: Pleural effusion.  Plan: Patient with L sided pleural effusion, unclear etiology. Patient denies symptoms of pneumonia, possibly traumatic given fall but unclear if rib fractures on CT, awaiting official read. No smoking history, and given one sided unlikely to be cancerous.   - Pulm consult in AM  - Follow up official read of CT    #Anemia  Patient with drop in hgb from prior admissions, possible loss in bruising and possible hemothorax  - Follow up CT read, if tapped to see if pleural effusion is blood  - Maintain active type and screen.     Problem/Plan - 3:  ·  Problem: Humeral fracture.  Plan: Patient s/p fall with humeral fracture on R arm, likely displaced. Orthopedics called in ED, follow up recs  - Follow up official xray read  - Non-weight bearing for now]  - Follow up orthopedic recs

## 2021-01-08 NOTE — ED ADULT NURSE NOTE - NSFALLRSKPASTHIST_ED_ALL_ED
[FreeTextEntry1] : Normal device function with adequate safety margins. He had multiple episodes of PMT, which may explain some of his symptoms.  His PVARP has already been extended to 400ms. Given his high atrial pacing percentage, the decision was made to change to DDIR.  This will prevent PMT and assure AV synchrony.  \par \par With respect to his atrial and ventricular arrhythmias.  We had a lengthy discussion regarding medical therapy which would most ideally include beta blockade.  He explains that he does not feel well on beta blockers and "would rather die" than live the way that he feels on beta blockers.  He has recently be started on Entresto.  Hopefully optimizing his HF medications will decrease his arrhythmias.  \par \par Follow up in 3 months. 
no

## 2021-04-08 NOTE — ED PROVIDER NOTE - DISCHARGE DATE
Patient is alert and verbal. Discharge home with father to continue recommended plan of care. Discharge instructions reviewed with patient. Patient verbalized understanding. Patient belongings and valuables returned. 11-Oct-2018

## 2021-07-01 NOTE — PATIENT PROFILE ADULT - OVER THE PAST TWO WEEKS, HAVE YOU FELT LITTLE INTEREST OR PLEASURE IN DOING THINGS?
What Type Of Note Output Would You Prefer (Optional)?: Standard Output
Is The Patient Presenting As Previously Scheduled?: No, they are a work-in
How Severe Is Your Rash?: mild
Is This A New Presentation, Or A Follow-Up?: Rash
no

## 2021-08-03 NOTE — PROCEDURE NOTE - ATTENDING PROVIDER
Patient ID: Caty is a 46 year old female.    Chief Complaint   Patient presents with   • Office Visit   • Forms Completion     Complete work physical form.    • Follow-up     A1C - diabetic follow up.      HPI     Type 2 DM - Due for A1C check. Taking Janumet 50-500mg BID.     Elevated cholesterol - Not taking the pravastatin (atorvatatin gave her a headache).     Has been exercising, walking 3 miles 3 days per week.     Fasting today for labs.     Right lateral elbow pain/discomfort with certain activities.     Needs forms/titers completed for work. Has form that needs to be completed.     Review of Systems   Musculoskeletal:        Right lateral elbow pain, as per HPI   Neurological: Negative for numbness.     Current Outpatient Medications   Medication Sig Dispense Refill   • sitaGLIPTIN-metFORMIN (Janumet)  MG per tablet Take 1 tablet by mouth 2 times daily (with meals). 180 tablet 0   • omeprazole (PrilOSEC) 20 MG capsule TAKE 1 CAPSULE BY MOUTH DAILY 90 capsule 0   • pravastatin (PRAVACHOL) 10 MG tablet Take 1 tablet by mouth daily. 30 tablet 3   • fluticasone (FLONASE) 50 MCG/ACT nasal spray Spray in each nostril daily.     • cetirizine (ZYRTEC ALLERGY) 10 MG tablet Take 10 mg by mouth daily as needed for Allergies.     • ALPRAZolam (XANAX) 0.5 MG tablet Take 1 tablet by mouth daily. Take 20 min prior to dental procedure. No driving while on this med. 5 tablet 0     No current facility-administered medications for this visit.     Problem List Items Addressed This Visit     None        Past Medical History:   Diagnosis Date   • Diabetes (CMS/HCC)    • Hyperlipidemia    • Obesity      Past Surgical History:   Procedure Laterality Date   • Leep     • Tonsillectomy       Family History   Problem Relation Age of Onset   • Diabetes Mother    • Hyperlipidemia Mother    • Cancer Father 39        Brain   • Lung Disease Maternal Grandmother         Pulmonary fibrosis   • Cancer Maternal Grandfather          unsure of type   • Cancer Paternal Grandfather         unsure what type   • Cancer, Colon Neg Hx    • Cancer, Esophageal Neg Hx    • Stomach Cancer Neg Hx    • Cancer, Rectal Neg Hx      Social History     Socioeconomic History   • Marital status: Single     Spouse name: Not on file   • Number of children: Not on file   • Years of education: Not on file   • Highest education level: Not on file   Occupational History   • Occupation: teacher   Tobacco Use   • Smoking status: Never Smoker   • Smokeless tobacco: Never Used   Vaping Use   • Vaping Use: never used   Substance and Sexual Activity   • Alcohol use: Yes     Alcohol/week: 2.0 standard drinks     Types: 2 Standard drinks or equivalent per week     Comment: occasional   • Drug use: No   • Sexual activity: Yes     Partners: Male     Birth control/protection: None     Comment: Vasectomy   Other Topics Concern   • Not on file   Social History Narrative   • Not on file     Social Determinants of Health     Financial Resource Strain:    • Social Determinants: Financial Resource Strain:    Food Insecurity:    • Social Determinants: Food Insecurity:    Transportation Needs:    • Lack of Transportation (Medical):    • Lack of Transportation (Non-Medical):    Physical Activity:    • Days of Exercise per Week:    • Minutes of Exercise per Session:    Stress:    • Social Determinants: Stress:    Social Connections:    • Social Determinants: Social Connections:    Intimate Partner Violence:    • Social Determinants: Intimate Partner Violence Past Fear:    • Social Determinants: Intimate Partner Violence Current Fear:        Patient's medications, allergies, past medical, surgical, social and family histories were reviewed and updated as appropriate.      Visit Vitals  /60 (BP Location: RUE - Right upper extremity, Patient Position: Sitting, Cuff Size: Regular)   Pulse 68   Temp 97.9 °F (36.6 °C) (Tympanic)   Resp 16   Ht 5' 4\" (1.626 m)   Wt 68.3 kg (150 lb 9.6 oz)    LMP 06/07/2021 (Exact Date)   BMI 25.85 kg/m²       Physical Exam  Constitutional:       General: She is not in acute distress.     Appearance: Normal appearance. She is not ill-appearing, toxic-appearing or diaphoretic.   Cardiovascular:      Rate and Rhythm: Normal rate and regular rhythm.      Heart sounds: Normal heart sounds. No murmur heard.   No friction rub. No gallop.    Pulmonary:      Effort: Pulmonary effort is normal. No respiratory distress.      Breath sounds: Normal breath sounds. No stridor. No wheezing, rhonchi or rales.   Musculoskeletal:      Comments: Mildly TTP of right lateral epicondyle and tenderness in this region with supination/pronation and wrist flexion against force.    Skin:     General: Skin is warm and dry.   Neurological:      General: No focal deficit present.      Mental Status: She is alert and oriented to person, place, and time.   Psychiatric:         Mood and Affect: Mood normal.         Behavior: Behavior normal.         Thought Content: Thought content normal.         Judgment: Judgment normal.           Caty was seen today for office visit, forms completion and follow-up.    Diagnoses and all orders for this visit:    Type 2 diabetes mellitus without complication, without long-term current use of insulin (CMS/[facility])  -     GLYCOHEMOGLOBIN    Pure hypercholesterolemia  -     LIPID PANEL WITH REFLEX    Screening for tuberculosis  -     QUANTIFERON TB PLUS    Immunity status testing  -     RUBEOLA IMMUNITY IGG  -     RUBELLA ANTIBODY IGG  -     MUMPS IMMUNE ANTIBODY IGG  -     HEPATITIS B SURFACE ANTIBODY    Lateral epicondylitis of right elbow      Type 2 DM - Due for A1C check. Taking Janumet 50-500mg BID.    - A1C today    Elevated cholesterol - Not taking the pravastatin (atorvatatin gave her a headache).    - Check FLP today off the statin (pt decided not to take)    Right lateral epicondylitis   - Ice, avoid aggravating activities, and NSAIDs prn    Needs forms/titers  completed for work. Has form that needs to be completed.    - Check titers and TB testing today   - UTD on Tdap      Medical compliance with plan discussed and risks of non-compliance reviewed.    Patient education completed on disease process, etiology & prognosis.    Patient expresses understanding of the plan.     Aimee Rider MD     Dr. Stover

## 2021-12-20 NOTE — PATIENT PROFILE ADULT - NSPROIMPLANTSMEDDEV_GEN_A_NUR
[facility]    PATIENT'S NAME: Joni Leonajose alberto   ATTENDING PHYSICIAN: Reji Echevarria MD   OPERATING PHYSICIAN: Reji Echevarria MD   PATIENT ACCOUNT#:   [id number]    LOCATION:  [place] [address]  MEDICAL RECORD #:   [id number] condition.      Dictated By Meena Parr MD  d: 12/20/2021 08:09:31  t: 12/20/2021 10:26:02  [facility] [id number]  LC/ None

## 2021-12-30 NOTE — ED ADULT NURSE NOTE - PAIN: BODY LOCATION
Left:/arm/shoulder W Plasty Text: The lesion was extirpated to the level of the fat with a #15 scalpel blade.  Given the location of the defect, shape of the defect and the proximity to free margins a W-plasty was deemed most appropriate for repair.  Using a sterile surgical marker, the appropriate transposition arms of the W-plasty were drawn incorporating the defect and placing the expected incisions within the relaxed skin tension lines where possible.    The area thus outlined was incised deep to adipose tissue with a #15 scalpel blade.  The skin margins were undermined to an appropriate distance in all directions utilizing iris scissors.  The opposing transposition arms were then transposed into place in opposite direction and anchored with interrupted buried subcutaneous sutures.

## 2022-02-27 NOTE — ED ADULT NURSE NOTE - FAMILY HISTORY
DISCHARGE SUMMARY     DATE OF DISCHARGE: 2/27/22    DISCHARGE DESTINATION: Home    HOME CARE: No    HEMODIALYSIS: No    TRANSPORTATION: Private Car    NEW DME ORDERED: no    COMMENTS: Wife will transport to home.   Electronically signed by Michael Hernandez RN on 2/27/2022 at 12:05 PM
No pertinent family history in first degree relatives

## 2022-09-08 NOTE — PROGRESS NOTE ADULT - SUBJECTIVE AND OBJECTIVE BOX
Chief Complaint/Reason for Consult: syncope  INTERVAL HPI: feels better, wants to go home, hydration continues, appreciate pulm recs re: effusion  	  MEDICATIONS:        acetaminophen   Tablet .. 650 milliGRAM(s) Oral every 6 hours PRN  zaleplon 5 milliGRAM(s) Oral at bedtime PRN    aluminum hydroxide/magnesium hydroxide/simethicone Suspension 30 milliLiter(s) Oral four times a day PRN  bisacodyl Suppository 10 milliGRAM(s) Rectal daily PRN  docusate sodium 100 milliGRAM(s) Oral three times a day  magnesium hydroxide Suspension 30 milliLiter(s) Oral daily PRN  pantoprazole    Tablet 40 milliGRAM(s) Oral before breakfast  polyethylene glycol 3350 17 Gram(s) Oral daily  senna 2 Tablet(s) Oral at bedtime PRN      aspirin enteric coated 81 milliGRAM(s) Oral daily  influenza   Vaccine 0.5 milliLiter(s) IntraMuscular once  lactated ringers. 1000 milliLiter(s) IV Continuous <Continuous>      REVIEW OF SYSTEMS:  [x] As per HPI  CONSTITUTIONAL: No fever, weight loss, or fatigue  RESPIRATORY: No cough, wheezing, chills or hemoptysis; No Shortness of Breath  CARDIOVASCULAR: No chest pain, palpitations, dizziness, or leg swelling  GASTROINTESTINAL: No abdominal or epigastric pain. No nausea, vomiting, or hematemesis; No diarrhea or constipation. No melena or hematochezia.  MUSCULOSKELETAL: No joint pain or swelling; No muscle, back, or extremity pain  [x] All others negative	  [ ] Unable to obtain    PHYSICAL EXAM:  T(C): 36.9 (01-18-19 @ 13:14), Max: 37.8 (01-17-19 @ 21:58)  HR: 96 (01-18-19 @ 12:55) (80 - 106)  BP: 148/90 (01-18-19 @ 12:55) (123/74 - 161/88)  RR: 18 (01-18-19 @ 12:00) (18 - 18)  SpO2: 83% (01-18-19 @ 12:55) (82% - 96%)  Wt(kg): --  I&O's Summary    17 Jan 2019 07:01  -  18 Jan 2019 07:00  --------------------------------------------------------  IN: 1200 mL / OUT: 1550 mL / NET: -350 mL    18 Jan 2019 07:01  -  18 Jan 2019 13:47  --------------------------------------------------------  IN: 120 mL / OUT: 200 mL / NET: -80 mL          Appearance: Normal	  HEENT:   Normal oral mucosa  Cardiovascular: Normal S1 S2, No JVD, No murmurs, No edema  Respiratory: Lungs clear to auscultation	  Gastrointestinal:  Soft, Non-tender, + BS	  Extremities: Normal range of motion, No clubbing, cyanosis or edema  Vascular: Peripheral pulses palpable 2+ bilaterally    TELEMETRY: 	    ECG:   	  RADIOLOGY:   CXR:  CT:  US:    CARDIAC TESTING:  Echocardiogram:  Catheterization:  Stress Test:      LABS:	 	    CARDIAC MARKERS:                                  12.5   10.1  )-----------( 241      ( 17 Jan 2019 07:00 )             36.9     01-17    136  |  96  |  9   ----------------------------<  143<H>  4.0   |  20<L>  |  0.86    Ca    9.0      17 Jan 2019 07:00  Phos  4.6     01-17  Mg     1.9     01-17    TPro  6.3  /  Alb  3.4  /  TBili  0.7  /  DBili  x   /  AST  33  /  ALT  31  /  AlkPhos  80  01-17    proBNP:   Lipid Profile:   HgA1c:   TSH:     ASSESSMENT/PLAN: 	    # vasovagal - cardiodepressive and vasoinhibitory effect when oob.   tele no events no pauses no rvr   12 lead ekg no s/s cad  recommend aggressive IV/PO hydration until orthostatics negative.  BID othostatic checks    Preop EKG - sinus rhythm-  WNL per medical clearance  	Preop Echocardiogram EF 66%  Preop CXR - WNL per medical clearance Last echo 6/22 with EF 68%, normal LV systolic function w/o WMA. Flattening of interventricular septum c/w RV overload, w/ severe R atrial enlargement, RV enlargement w/ decreased RV systolic function, severely elevated pulmonary pressures.   - diuretics as above  - strict I/Os, daily standing weights  - BNP decreased since 2 weeks ago but still elevated in 7000's  - holding spironolactone in setting of previous hyperkalemia, consider restarting pending HF recommendations

## 2024-05-20 NOTE — PATIENT PROFILE ADULT - CAREGIVER NAME
\"Have you been to the ER, urgent care clinic since your last visit?  Hospitalized since your last visit?\"    NO    “Have you seen or consulted any other health care providers outside of [facility] since your last visit?”    NO        “Have you had a colorectal cancer screening such as a colonoscopy/FIT/Cologuard?    YES - Type: Colonoscopy - Patient stated he had colonoscopy in the last 10 years but can't recall what facility.Nurse/CMA to request most recent records if not in the chart     No colonoscopy on file  No cologuard on file  No FIT/FOBT on file   No flexible sigmoidoscopy on file         Click Here for Release of Records Request    denia landin

## 2024-11-27 NOTE — PROGRESS NOTE ADULT - PROBLEM SELECTOR PLAN 2
- anticoagulated with Eliquis / [id number] score of 4 (age, sex, CHF, DM)  - EF of 35% per echo 3/2024 /moderate dilation of left atrium noted  - rate control: metoprolol succinate  - antiarrhythmic therapy: dofetilide (switched from amio due to long term toxicities)  - prior cardioversion: none  - prior ablation: none   - originally 2/2 pleural effusion, complicated by pneumothorax   - Now with improvement in pneumothorax, SOB has improved